# Patient Record
Sex: MALE | Race: WHITE | NOT HISPANIC OR LATINO
[De-identification: names, ages, dates, MRNs, and addresses within clinical notes are randomized per-mention and may not be internally consistent; named-entity substitution may affect disease eponyms.]

---

## 2017-07-17 ENCOUNTER — APPOINTMENT (OUTPATIENT)
Dept: GASTROENTEROLOGY | Facility: CLINIC | Age: 62
End: 2017-07-17

## 2017-07-17 VITALS
HEIGHT: 71 IN | DIASTOLIC BLOOD PRESSURE: 80 MMHG | BODY MASS INDEX: 34.44 KG/M2 | WEIGHT: 246 LBS | TEMPERATURE: 98.1 F | SYSTOLIC BLOOD PRESSURE: 120 MMHG

## 2017-07-17 DIAGNOSIS — Z80.9 FAMILY HISTORY OF MALIGNANT NEOPLASM, UNSPECIFIED: ICD-10-CM

## 2017-07-17 DIAGNOSIS — K44.9 DIAPHRAGMATIC HERNIA W/OUT OBSTRUCTION OR GANGRENE: ICD-10-CM

## 2017-07-17 DIAGNOSIS — K57.90 DIVERTICULOSIS OF INTESTINE, PART UNSPECIFIED, W/OUT PERFORATION OR ABSCESS W/OUT BLEEDING: ICD-10-CM

## 2017-07-17 DIAGNOSIS — Z71.89 OTHER SPECIFIED COUNSELING: ICD-10-CM

## 2017-07-17 DIAGNOSIS — K92.2 GASTROINTESTINAL HEMORRHAGE, UNSPECIFIED: ICD-10-CM

## 2017-07-17 DIAGNOSIS — D64.9 ANEMIA, UNSPECIFIED: ICD-10-CM

## 2017-07-17 RX ORDER — ALPRAZOLAM 0.25 MG/1
0.25 TABLET ORAL
Qty: 60 | Refills: 0 | Status: ACTIVE | COMMUNITY
Start: 2017-01-27

## 2017-07-17 RX ORDER — CARVEDILOL 3.12 MG/1
3.12 TABLET, FILM COATED ORAL
Qty: 60 | Refills: 0 | Status: ACTIVE | COMMUNITY
Start: 2017-07-14

## 2017-07-17 RX ORDER — VENLAFAXINE 75 MG/1
75 TABLET ORAL
Qty: 30 | Refills: 0 | Status: ACTIVE | COMMUNITY
Start: 2016-10-10

## 2017-07-17 RX ORDER — DICLOFENAC SODIUM 10 MG/G
1 GEL TOPICAL
Qty: 500 | Refills: 0 | Status: ACTIVE | COMMUNITY
Start: 2017-04-06

## 2017-07-18 ENCOUNTER — APPOINTMENT (OUTPATIENT)
Dept: GASTROENTEROLOGY | Facility: AMBULATORY MEDICAL SERVICES | Age: 62
End: 2017-07-18

## 2017-07-19 ENCOUNTER — APPOINTMENT (OUTPATIENT)
Dept: GASTROENTEROLOGY | Facility: CLINIC | Age: 62
End: 2017-07-19

## 2017-07-25 ENCOUNTER — LABORATORY RESULT (OUTPATIENT)
Age: 62
End: 2017-07-25

## 2017-08-14 ENCOUNTER — RX RENEWAL (OUTPATIENT)
Age: 62
End: 2017-08-14

## 2017-08-14 RX ORDER — SODIUM SULFATE, POTASSIUM SULFATE, MAGNESIUM SULFATE 17.5; 3.13; 1.6 G/ML; G/ML; G/ML
17.5-3.13-1.6 SOLUTION, CONCENTRATE ORAL
Qty: 1 | Refills: 0 | Status: ACTIVE | COMMUNITY
Start: 2017-08-14 | End: 1900-01-01

## 2017-08-18 ENCOUNTER — OUTPATIENT (OUTPATIENT)
Dept: OUTPATIENT SERVICES | Facility: HOSPITAL | Age: 62
LOS: 1 days | Discharge: HOME | End: 2017-08-18

## 2017-08-18 ENCOUNTER — APPOINTMENT (OUTPATIENT)
Dept: GASTROENTEROLOGY | Facility: HOSPITAL | Age: 62
End: 2017-08-18
Payer: COMMERCIAL

## 2017-08-18 VITALS
OXYGEN SATURATION: 97 % | WEIGHT: 246.92 LBS | SYSTOLIC BLOOD PRESSURE: 103 MMHG | RESPIRATION RATE: 16 BRPM | TEMPERATURE: 97 F | HEART RATE: 70 BPM | HEIGHT: 71 IN | DIASTOLIC BLOOD PRESSURE: 68 MMHG

## 2017-08-18 VITALS
DIASTOLIC BLOOD PRESSURE: 60 MMHG | RESPIRATION RATE: 18 BRPM | HEART RATE: 62 BPM | OXYGEN SATURATION: 99 % | SYSTOLIC BLOOD PRESSURE: 104 MMHG

## 2017-08-18 DIAGNOSIS — K92.1 MELENA: ICD-10-CM

## 2017-08-18 DIAGNOSIS — Z98.89 OTHER SPECIFIED POSTPROCEDURAL STATES: Chronic | ICD-10-CM

## 2017-08-18 LAB
HCT VFR BLD CALC: 40.8 % — SIGNIFICANT CHANGE UP (ref 39–50)
HGB BLD-MCNC: 12.7 G/DL — LOW (ref 13–17)
MCHC RBC-ENTMCNC: 23.8 PG — LOW (ref 27–34)
MCHC RBC-ENTMCNC: 31 GM/DL — LOW (ref 32–36)
MCV RBC AUTO: 77 FL — LOW (ref 80–100)
PLATELET # BLD AUTO: 256 K/UL — SIGNIFICANT CHANGE UP (ref 150–400)
RBC # BLD: 5.31 M/UL — SIGNIFICANT CHANGE UP (ref 4.2–5.8)
RBC # FLD: 22.7 % — HIGH (ref 11–15)
WBC # BLD: 9 K/UL — SIGNIFICANT CHANGE UP (ref 3.8–10.5)
WBC # FLD AUTO: 9 K/UL — SIGNIFICANT CHANGE UP (ref 3.8–10.5)

## 2017-08-18 PROCEDURE — G0121 COLON CA SCRN NOT HI RSK IND: CPT | Mod: 53

## 2017-08-18 RX ORDER — SODIUM CHLORIDE 9 MG/ML
1000 INJECTION, SOLUTION INTRAVENOUS
Qty: 0 | Refills: 0 | Status: DISCONTINUED | OUTPATIENT
Start: 2017-08-18 | End: 2017-09-02

## 2017-08-23 DIAGNOSIS — D64.9 ANEMIA, UNSPECIFIED: ICD-10-CM

## 2017-08-23 DIAGNOSIS — I10 ESSENTIAL (PRIMARY) HYPERTENSION: ICD-10-CM

## 2017-09-05 ENCOUNTER — INPATIENT (INPATIENT)
Facility: HOSPITAL | Age: 62
LOS: 2 days | Discharge: HOME | End: 2017-09-08
Attending: HOSPITALIST | Admitting: HOSPITALIST
Payer: COMMERCIAL

## 2017-09-05 VITALS
WEIGHT: 246.92 LBS | RESPIRATION RATE: 18 BRPM | DIASTOLIC BLOOD PRESSURE: 85 MMHG | HEART RATE: 8 BPM | OXYGEN SATURATION: 99 % | TEMPERATURE: 98 F | HEIGHT: 71 IN | SYSTOLIC BLOOD PRESSURE: 131 MMHG

## 2017-09-05 DIAGNOSIS — Z98.89 OTHER SPECIFIED POSTPROCEDURAL STATES: Chronic | ICD-10-CM

## 2017-09-05 LAB
ABO RH CONFIRMATION: SIGNIFICANT CHANGE UP
ALBUMIN SERPL ELPH-MCNC: 2.9 G/DL — LOW (ref 3.3–5)
ALP SERPL-CCNC: 51 U/L — SIGNIFICANT CHANGE UP (ref 40–120)
ALT FLD-CCNC: 20 U/L — SIGNIFICANT CHANGE UP (ref 12–78)
ANION GAP SERPL CALC-SCNC: 12 MMOL/L — SIGNIFICANT CHANGE UP (ref 5–17)
APTT BLD: 28.5 SEC — SIGNIFICANT CHANGE UP (ref 27.5–37.4)
AST SERPL-CCNC: 17 U/L — SIGNIFICANT CHANGE UP (ref 15–37)
BASOPHILS NFR BLD AUTO: 1 % — SIGNIFICANT CHANGE UP (ref 0–2)
BILIRUB SERPL-MCNC: 0.1 MG/DL — LOW (ref 0.2–1.2)
BLD GP AB SCN SERPL QL: SIGNIFICANT CHANGE UP
BUN SERPL-MCNC: 18 MG/DL — SIGNIFICANT CHANGE UP (ref 7–23)
CALCIUM SERPL-MCNC: 8.2 MG/DL — LOW (ref 8.5–10.1)
CHLORIDE SERPL-SCNC: 104 MMOL/L — SIGNIFICANT CHANGE UP (ref 96–108)
CO2 SERPL-SCNC: 25 MMOL/L — SIGNIFICANT CHANGE UP (ref 22–31)
CREAT SERPL-MCNC: 0.85 MG/DL — SIGNIFICANT CHANGE UP (ref 0.5–1.3)
GLUCOSE SERPL-MCNC: 100 MG/DL — HIGH (ref 70–99)
HCT VFR BLD CALC: 20.5 % — CRITICAL LOW (ref 39–50)
HGB BLD-MCNC: 6.2 G/DL — CRITICAL LOW (ref 13–17)
INR BLD: 1.17 RATIO — HIGH (ref 0.88–1.16)
LYMPHOCYTES # BLD AUTO: 11 % — LOW (ref 13–44)
MCHC RBC-ENTMCNC: 23.8 PG — LOW (ref 27–34)
MCHC RBC-ENTMCNC: 30.2 GM/DL — LOW (ref 32–36)
MCV RBC AUTO: 78.8 FL — LOW (ref 80–100)
MONOCYTES NFR BLD AUTO: 5 % — SIGNIFICANT CHANGE UP (ref 2–14)
NEUTROPHILS NFR BLD AUTO: 83 % — HIGH (ref 43–77)
OB PNL STL: NEGATIVE — SIGNIFICANT CHANGE UP
PLATELET # BLD AUTO: 384 K/UL — SIGNIFICANT CHANGE UP (ref 150–400)
POTASSIUM SERPL-MCNC: 3.4 MMOL/L — LOW (ref 3.5–5.3)
POTASSIUM SERPL-SCNC: 3.4 MMOL/L — LOW (ref 3.5–5.3)
PROT SERPL-MCNC: 6.3 GM/DL — SIGNIFICANT CHANGE UP (ref 6–8.3)
PROTHROM AB SERPL-ACNC: 12.8 SEC — HIGH (ref 9.8–12.7)
RBC # BLD: 2.6 M/UL — LOW (ref 4.2–5.8)
RBC # FLD: 20.9 % — HIGH (ref 11–15)
SODIUM SERPL-SCNC: 141 MMOL/L — SIGNIFICANT CHANGE UP (ref 135–145)
WBC # BLD: 11.7 K/UL — HIGH (ref 3.8–10.5)
WBC # FLD AUTO: 11.7 K/UL — HIGH (ref 3.8–10.5)

## 2017-09-05 PROCEDURE — 99284 EMERGENCY DEPT VISIT MOD MDM: CPT

## 2017-09-05 RX ORDER — SODIUM CHLORIDE 9 MG/ML
1000 INJECTION INTRAMUSCULAR; INTRAVENOUS; SUBCUTANEOUS ONCE
Qty: 0 | Refills: 0 | Status: COMPLETED | OUTPATIENT
Start: 2017-09-05 | End: 2017-09-05

## 2017-09-05 RX ADMIN — SODIUM CHLORIDE 1000 MILLILITER(S): 9 INJECTION INTRAMUSCULAR; INTRAVENOUS; SUBCUTANEOUS at 21:47

## 2017-09-05 NOTE — ED PROVIDER NOTE - PROGRESS NOTE DETAILS
pt. for admission to [facility], discussed with patient and Dr. reyes  will transfuse 2 unites for hbg 6, pt. stable on monitor

## 2017-09-05 NOTE — ED ADULT TRIAGE NOTE - CHIEF COMPLAINT QUOTE
pt presents to the ED with c/o feeling fatigue and pale, has a hx of GI bleed in the past with transfusion

## 2017-09-05 NOTE — ED ADULT NURSE NOTE - OBJECTIVE STATEMENT
pt came in with c/o weakness,fatigue and bodyache, pt had hx of low blood count with transfusion, denies any kind of bleeding what so ever, axox3, not in any distress.

## 2017-09-05 NOTE — ED PROVIDER NOTE - MEDICAL DECISION MAKING DETAILS
61 yo M with easy fatigability  -concerning for anemia, basic labs cbc, cmp  -f/u results, IV, NS, call GI doc

## 2017-09-06 DIAGNOSIS — D64.9 ANEMIA, UNSPECIFIED: ICD-10-CM

## 2017-09-06 DIAGNOSIS — F41.9 ANXIETY DISORDER, UNSPECIFIED: ICD-10-CM

## 2017-09-06 DIAGNOSIS — Z72.0 TOBACCO USE: ICD-10-CM

## 2017-09-06 DIAGNOSIS — Z29.9 ENCOUNTER FOR PROPHYLACTIC MEASURES, UNSPECIFIED: ICD-10-CM

## 2017-09-06 LAB
FERRITIN SERPL-MCNC: 30 NG/ML — SIGNIFICANT CHANGE UP (ref 30–400)
HCT VFR BLD CALC: 22.4 % — LOW (ref 39–50)
HCT VFR BLD CALC: 23.3 % — LOW (ref 39–50)
HGB BLD-MCNC: 7 G/DL — CRITICAL LOW (ref 13–17)
HGB BLD-MCNC: 7.2 G/DL — LOW (ref 13–17)
IRON SATN MFR SERPL: 21 % — SIGNIFICANT CHANGE UP (ref 16–55)
IRON SATN MFR SERPL: 66 UG/DL — SIGNIFICANT CHANGE UP (ref 45–165)
MCHC RBC-ENTMCNC: 25.1 PG — LOW (ref 27–34)
MCHC RBC-ENTMCNC: 25.7 PG — LOW (ref 27–34)
MCHC RBC-ENTMCNC: 30.9 GM/DL — LOW (ref 32–36)
MCHC RBC-ENTMCNC: 31.3 GM/DL — LOW (ref 32–36)
MCV RBC AUTO: 81.2 FL — SIGNIFICANT CHANGE UP (ref 80–100)
MCV RBC AUTO: 82.2 FL — SIGNIFICANT CHANGE UP (ref 80–100)
PLATELET # BLD AUTO: 300 K/UL — SIGNIFICANT CHANGE UP (ref 150–400)
PLATELET # BLD AUTO: 324 K/UL — SIGNIFICANT CHANGE UP (ref 150–400)
RBC # BLD: 2.72 M/UL — LOW (ref 4.2–5.8)
RBC # BLD: 2.88 M/UL — LOW (ref 4.2–5.8)
RBC # FLD: 19.6 % — HIGH (ref 11–15)
RBC # FLD: 19.7 % — HIGH (ref 11–15)
TIBC SERPL-MCNC: 307 UG/DL — SIGNIFICANT CHANGE UP (ref 220–430)
UIBC SERPL-MCNC: 241 UG/DL — SIGNIFICANT CHANGE UP (ref 110–370)
VIT B12 SERPL-MCNC: 493 PG/ML — SIGNIFICANT CHANGE UP (ref 243–894)
WBC # BLD: 7.6 K/UL — SIGNIFICANT CHANGE UP (ref 3.8–10.5)
WBC # BLD: 8.2 K/UL — SIGNIFICANT CHANGE UP (ref 3.8–10.5)
WBC # FLD AUTO: 7.6 K/UL — SIGNIFICANT CHANGE UP (ref 3.8–10.5)
WBC # FLD AUTO: 8.2 K/UL — SIGNIFICANT CHANGE UP (ref 3.8–10.5)

## 2017-09-06 PROCEDURE — 99223 1ST HOSP IP/OBS HIGH 75: CPT

## 2017-09-06 PROCEDURE — 93010 ELECTROCARDIOGRAM REPORT: CPT

## 2017-09-06 PROCEDURE — 12345: CPT | Mod: NC

## 2017-09-06 RX ORDER — NICOTINE POLACRILEX 2 MG
1 GUM BUCCAL DAILY
Qty: 0 | Refills: 0 | Status: DISCONTINUED | OUTPATIENT
Start: 2017-09-06 | End: 2017-09-08

## 2017-09-06 RX ORDER — SODIUM CHLORIDE 9 MG/ML
1000 INJECTION, SOLUTION INTRAVENOUS
Qty: 0 | Refills: 0 | Status: DISCONTINUED | OUTPATIENT
Start: 2017-09-06 | End: 2017-09-08

## 2017-09-06 RX ORDER — PANTOPRAZOLE SODIUM 20 MG/1
40 TABLET, DELAYED RELEASE ORAL
Qty: 0 | Refills: 0 | Status: DISCONTINUED | OUTPATIENT
Start: 2017-09-06 | End: 2017-09-08

## 2017-09-06 RX ORDER — VENLAFAXINE HCL 75 MG
25 CAPSULE, EXT RELEASE 24 HR ORAL AT BEDTIME
Qty: 0 | Refills: 0 | Status: DISCONTINUED | OUTPATIENT
Start: 2017-09-06 | End: 2017-09-08

## 2017-09-06 RX ORDER — ZOLPIDEM TARTRATE 10 MG/1
5 TABLET ORAL AT BEDTIME
Qty: 0 | Refills: 0 | Status: DISCONTINUED | OUTPATIENT
Start: 2017-09-06 | End: 2017-09-08

## 2017-09-06 RX ADMIN — Medication 25 MILLIGRAM(S): at 21:42

## 2017-09-06 RX ADMIN — ZOLPIDEM TARTRATE 5 MILLIGRAM(S): 10 TABLET ORAL at 23:47

## 2017-09-06 RX ADMIN — PANTOPRAZOLE SODIUM 40 MILLIGRAM(S): 20 TABLET, DELAYED RELEASE ORAL at 05:40

## 2017-09-06 RX ADMIN — PANTOPRAZOLE SODIUM 40 MILLIGRAM(S): 20 TABLET, DELAYED RELEASE ORAL at 18:12

## 2017-09-06 RX ADMIN — SODIUM CHLORIDE 80 MILLILITER(S): 9 INJECTION, SOLUTION INTRAVENOUS at 07:33

## 2017-09-06 NOTE — H&P ADULT - NSHPPHYSICALEXAM_GEN_ALL_CORE
GENERAL: NAD, well-groomed, well-developed  HEAD:  Atraumatic, Normocephalic  EYES: EOMI, PERRLA, conjunctiva and sclera clear  ENMT: No tonsillar erythema, exudates, or enlargement; Moist mucous membranes, No lesions  NECK: Supple, No JVD, Normal thyroid  NERVOUS SYSTEM:  Alert & Oriented X3, Good concentration  CHEST/LUNG: Clear to auscultation bilaterally; No rales, rhonchi, wheezing, or rubs  HEART: Regular rate and rhythm; No murmurs, rubs, or gallops  ABDOMEN: Soft, Nontender, Nondistended; Bowel sounds present  EXTREMITIES: No clubbing, cyanosis, or edema  SKIN: no rashes, lesions, bruising  PSYCH: normal affect and behavior

## 2017-09-06 NOTE — CONSULT NOTE ADULT - SUBJECTIVE AND OBJECTIVE BOX
Chief Complaint:  Patient is a 62y old  Male who presents with a chief complaint of Anemia (06 Sep 2017 01:55)      HPI:  62 year old man with PMH PUD with bleeding ulcers in the past (was taking NSAIDs for back pain) presents sent in by GI Dr Brunner for feeling weak and lethargic-h/h 6.2/20.5 in ED.  Patient has had a history of anemic episodes and has been worked up by his GI in the past; the last 2 times his EGD was normal but Colonoscopy needed to be repeated due to poor prep.  He states that he works out regularly and generally feels "great, half my age" but noticed that he was markedly weak and tired over the last few days.  He denies bruising, bleeding gums, dark stools, nausea, vomiting, diarrhea, abdominal pain.  Pt states he had polyps removed in previous Colonoscopy 3 year ago, Last Colon was 2weeks ago, patient states MD told him prep was poor.    In the ED, FOBT neg, h/h 6.2/20.5, vitals stable.  ED ordered 2 units pRBCs for transfusion. (06 Sep 2017 01:55)      PMH/PSH:PAST MEDICAL & SURGICAL HISTORY:  Gastrointestinal ulcer  Anxiety  Seasonal allergies  H/O inguinal hernia repair      Allergies:  No Known Allergies      Medications:  dextrose 5% + sodium chloride 0.9%. 1000 milliLiter(s) IV Continuous <Continuous>  venlafaxine 25 milliGRAM(s) Oral at bedtime  zolpidem 5 milliGRAM(s) Oral at bedtime PRN  pantoprazole    Tablet 40 milliGRAM(s) Oral two times a day before meals  nicotine -  14 mG/24Hr(s) Patch 1 patch Transdermal daily      Review of Systems:    General:  No weight loss, fevers, chills, night sweats, fatigue,   Eyes:  Good vision, no reported pain  ENT:  No sore throat, pain, runny nose, dysphagia  CV:  No pain, palpitations, hypo/hypertension  Resp:  No dyspnea, cough, tachypnea, wheezing  GI:  No pain, No nausea, No vomiting, No diarrhea, No constipation, No pruritis, No rectal bleeding, No tarry stools, No dysphagia,  :  No pain, bleeding, incontinence, nocturia  Muscle:  No pain, weakness  Breast:  No pain, abscess, mass, discharge  Neuro:  No weakness, tingling, memory problems  Psych:  No fatigue, insomnia, mood problems, depression  Endocrine:  No polyuria, polydipsia cold/heat intolerance  Heme:  No petechiae, ecchymosis, easy bruisability  Skin:  No rash, tattoos, scars, edema    FAMILY HISTORY:  Family history of hypertension (Father)      Relevant Social History:     Physical Exam:    Vital Signs:  Vital Signs Last 24 Hrs  T(C): 36.8 (06 Sep 2017 10:52), Max: 37.3 (06 Sep 2017 00:30)  T(F): 98.2 (06 Sep 2017 10:52), Max: 99.1 (06 Sep 2017 00:30)  HR: 69 (06 Sep 2017 10:52) (8 - 81)  BP: 124/46 (06 Sep 2017 10:52) (113/72 - 136/78)  BP(mean): --  RR: 18 (06 Sep 2017 10:52) (17 - 19)  SpO2: 96% (06 Sep 2017 10:52) (96% - 99%)  Daily Height in cm: 180.34 (06 Sep 2017 04:35)    Daily Weight in k.8 (06 Sep 2017 04:35)    General:  Appears stated age, well-groomed, well-nourished, no distress  HEENT:  NC/AT,  conjunctivae clear and pink, no thyromegaly, nodules, adenopathy, no JVD  Chest:  Full & symmetric excursion, no increased effort, breath sounds clear  Cardiovascular:  Regular rhythm, S1, S2, no murmur/rub/S3/S4, no abdominal bruit, no edema  Abdomen:  Soft, non-tender, non-distended, normoactive bowel sounds,  no masses , no hepatosplenomegaly, no signs of chronic liver disease  Extremities:  no cyanosis, clubbing or edema  Skin:  No rash/erythema/ecchymoses/petechiae/wounds/abscess/warm/dry  Neuro/Psych:  Alert, oriented, no asterixis, no tremor, no encephalopathy    Laboratory:                          7.2    8.2   )-----------( 324      ( 06 Sep 2017 15:59 )             23.3     09-05    141  |  104  |  18  ----------------------------<  100<H>  3.4<L>   |  25  |  0.85    Ca    8.2<L>      05 Sep 2017 21:38    TPro  6.3  /  Alb  2.9<L>  /  TBili  0.1<L>  /  DBili  x   /  AST  17  /  ALT  20  /  AlkPhos  51  09-05    LIVER FUNCTIONS - ( 05 Sep 2017 21:38 )  Alb: 2.9 g/dL / Pro: 6.3 gm/dL / ALK PHOS: 51 U/L / ALT: 20 U/L / AST: 17 U/L / GGT: x           PT/INR - ( 05 Sep 2017 21:38 )   PT: 12.8 sec;   INR: 1.17 ratio         PTT - ( 05 Sep 2017 21:38 )  PTT:28.5 sec    Imaging:      Chief Complaint:  Patient is a 62y old  Male who presents with a chief complaint of Anemia (06 Sep 2017 01:55)      HPI:  62 year old man with PMH PUD with bleeding ulcers in the past (was taking NSAIDs for back pain) presents sent in by GI MD for feeling weak and lethargic-h/h 6.2/20.5 in ED.  Patient has had a history of anemic episodes and has been worked up by his GI in the past; the last 2 times his EGD was normal but Colonoscopy needed to be repeated due to poor prep.  He states that he works out regularly and generally feels "great, half my age" but noticed that he was markedly weak and tired over the last few days.  He denies bruising, bleeding gums, dark stools, nausea, vomiting, diarrhea, abdominal pain.    Pt had polyps removed in previous Colonoscopy.    In the ED, FOBT neg, h/h 6.2/20.5, vitals stable.  ED ordered 2 units pRBCs for transfusion. (06 Sep 2017 01:55)      PMH/PSH:PAST MEDICAL & SURGICAL HISTORY:  Gastrointestinal ulcer  Anxiety  Seasonal allergies  H/O inguinal hernia repair      Allergies:  No Known Allergies      Medications:  dextrose 5% + sodium chloride 0.9%. 1000 milliLiter(s) IV Continuous <Continuous>  venlafaxine 25 milliGRAM(s) Oral at bedtime  zolpidem 5 milliGRAM(s) Oral at bedtime PRN  pantoprazole    Tablet 40 milliGRAM(s) Oral two times a day before meals  nicotine -  14 mG/24Hr(s) Patch 1 patch Transdermal daily      Review of Systems:    General:  No weight loss, fevers, chills, night sweats, fatigue,   Eyes:  Good vision, no reported pain  ENT:  No sore throat, pain, runny nose, dysphagia  CV:  No pain, palpitations, hypo/hypertension  Resp:  No dyspnea, cough, tachypnea, wheezing  GI:  No pain, No nausea, No vomiting, No diarrhea, No constipation, No pruritis, No rectal bleeding, No tarry stools, No dysphagia,  :  No pain, bleeding, incontinence, nocturia  Muscle:  No pain, weakness  Breast:  No pain, abscess, mass, discharge  Neuro:  No weakness, tingling, memory problems  Psych:  No fatigue, insomnia, mood problems, depression  Endocrine:  No polyuria, polydypsia, cold/heat intolerance  Heme:  No petechiae, ecchymosis, easy bruisability  Skin:  No rash, tattoos, scars, edema    FAMILY HISTORY:  Family history of hypertension (Father)      Relevant Social History:     Physical Exam:    Vital Signs:  Vital Signs Last 24 Hrs  T(C): 36.8 (06 Sep 2017 10:52), Max: 37.3 (06 Sep 2017 00:30)  T(F): 98.2 (06 Sep 2017 10:52), Max: 99.1 (06 Sep 2017 00:30)  HR: 69 (06 Sep 2017 10:52) (8 - 81)  BP: 124/46 (06 Sep 2017 10:52) (113/72 - 136/78)  BP(mean): --  RR: 18 (06 Sep 2017 10:52) (17 - 19)  SpO2: 96% (06 Sep 2017 10:52) (96% - 99%)  Daily Height in cm: 180.34 (06 Sep 2017 04:35)    Daily Weight in k.8 (06 Sep 2017 04:35)    General:  Appears stated age, well-groomed, well-nourished, no distress  HEENT:  NC/AT,  conjunctivae clear and pink, no thyromegaly, nodules, adenopathy, no JVD  Chest:  Full & symmetric excursion, no increased effort, breath sounds clear  Cardiovascular:  Regular rhythm, S1, S2, no murmur/rub/S3/S4, no abdominal bruit, no edema  Abdomen:  Soft, non-tender, non-distended, normoactive bowel sounds,  no masses , no hepatosplenomegaly, no signs of chronic liver disease  Extremities:  no cyanosis, clubbing or edema  Skin:  No rash/erythema/ecchymoses/petechiae/wounds/abscess/warm/dry  Neuro/Psych:  Alert, oriented, no asterixis, no tremor, no encephalopathy    Laboratory:                          7.2    8.2   )-----------( 324      ( 06 Sep 2017 15:59 )             23.3         141  |  104  |  18  ----------------------------<  100<H>  3.4<L>   |  25  |  0.85    Ca    8.2<L>      05 Sep 2017 21:38    TPro  6.3  /  Alb  2.9<L>  /  TBili  0.1<L>  /  DBili  x   /  AST  17  /  ALT  20  /  AlkPhos  51  -    LIVER FUNCTIONS - ( 05 Sep 2017 21:38 )  Alb: 2.9 g/dL / Pro: 6.3 gm/dL / ALK PHOS: 51 U/L / ALT: 20 U/L / AST: 17 U/L / GGT: x           PT/INR - ( 05 Sep 2017 21:38 )   PT: 12.8 sec;   INR: 1.17 ratio         PTT - ( 05 Sep 2017 21:38 )  PTT:28.5 sec

## 2017-09-06 NOTE — H&P ADULT - HISTORY OF PRESENT ILLNESS
62 year old man with PMH PUD with bleeding ulcers in the past (was taking NSAIDs for back pain) presents sent in by GI MD for feeling weak and lethargic-h/h 6.2/20.5 in ED.  Patient has had a history of anemic episodes and has been worked up by his GI 62 year old man with PMH PUD with bleeding ulcers in the past (was taking NSAIDs for back pain) presents sent in by GI MD for feeling weak and lethargic-h/h 6.2/20.5 in ED.  Patient has had a history of anemic episodes and has been worked up by his GI in the past; the last 2 times his EGD was normal but Colonoscopy needed to be repeated due to poor prep.  He states that he works out regularly and generally feels "great, half my age" but noticed that he was markedly weak and tired over the last few days.  He denies bruising, bleeding gums, dark stools, nausea, vomiting, diarrhea, abdominal pain.    Pt had polyps removed in previous Colonoscopy.    In the ED, FOBT neg, h/h 6.2/20.5, vitals stable.  ED ordered 2 units pRBCs for transfusion.

## 2017-09-06 NOTE — H&P ADULT - ASSESSMENT
62 year old man with PMH PUD with bleeding ulcers in the past (was taking NSAIDs for back pain) presents sent in by GI MD for feeling weak and lethargic-h/h 6.2/20.5 in ED.  Signs, symptoms, and work up consistent with Symptomatic anemia possibly from a GI source.

## 2017-09-06 NOTE — H&P ADULT - PROBLEM SELECTOR PLAN 1
Will transfuse 2 units pRBCs now.  Repeat CBC ~4 hours post transfusion.  FOBT neg  Send anemia work, Iron, Ferritin, TIBC, B12, likely secondary to GIB given history  Telemetry for now  GI Prophylaxis with Protonix 40mg po BID.  NPO for now, GI consult  D5 with NS, finger stick q6h

## 2017-09-06 NOTE — H&P ADULT - NSHPLABSRESULTS_GEN_ALL_CORE
Vital Signs Last 24 Hrs  T(C): 37.1 (06 Sep 2017 01:53), Max: 37.3 (06 Sep 2017 00:30)  T(F): 98.8 (06 Sep 2017 01:53), Max: 99.1 (06 Sep 2017 00:30)  HR: 77 (06 Sep 2017 01:53) (8 - 81)  BP: 113/72 (06 Sep 2017 01:53) (113/72 - 136/78)  BP(mean): --  RR: 19 (06 Sep 2017 01:53) (17 - 19)  SpO2: 96% (06 Sep 2017 01:53) (96% - 99%)          LABS:                        6.2    11.7  )-----------( 384      ( 05 Sep 2017 21:38 )             20.5     09-05    141  |  104  |  18  ----------------------------<  100<H>  3.4<L>   |  25  |  0.85    Ca    8.2<L>      05 Sep 2017 21:38    TPro  6.3  /  Alb  2.9<L>  /  TBili  0.1<L>  /  DBili  x   /  AST  17  /  ALT  20  /  AlkPhos  51  09-05    PT/INR - ( 05 Sep 2017 21:38 )   PT: 12.8 sec;   INR: 1.17 ratio         PTT - ( 05 Sep 2017 21:38 )  PTT:28.5 sec

## 2017-09-06 NOTE — CHART NOTE - NSCHARTNOTEFT_GEN_A_CORE
62 year old man with PMH PUD with bleeding ulcers in the past (was taking NSAIDs for back pain) presents sent in by GI MD for feeling weak and lethargic-h/h 6.2/20.5 in ED.   In Aug 18 2017 ,H/H =12.7/40.8 , negative occult blood , GI consulted-  , s/p 2 units PRBC, Hb s/p transfusion = 7/22.4, repeat CBC again , will transfuse if <8 , c/w IVF, On clears  Discussed in length w/ pt 62 year old man with PMH PUD with bleeding ulcers in the past (was taking NSAIDs for back pain) presents sent in by GI MD for feeling weak and lethargic-h/h 6.2/20.5 in ED.   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 Again was called from floor as pt requesting to talk , spoke again in length twice more .

## 2017-09-07 LAB
ANION GAP SERPL CALC-SCNC: 7 MMOL/L — SIGNIFICANT CHANGE UP (ref 5–17)
BUN SERPL-MCNC: 8 MG/DL — SIGNIFICANT CHANGE UP (ref 7–23)
CALCIUM SERPL-MCNC: 7.9 MG/DL — LOW (ref 8.5–10.1)
CHLORIDE SERPL-SCNC: 110 MMOL/L — HIGH (ref 96–108)
CO2 SERPL-SCNC: 27 MMOL/L — SIGNIFICANT CHANGE UP (ref 22–31)
CREAT SERPL-MCNC: 0.71 MG/DL — SIGNIFICANT CHANGE UP (ref 0.5–1.3)
GLUCOSE SERPL-MCNC: 88 MG/DL — SIGNIFICANT CHANGE UP (ref 70–99)
HCT VFR BLD CALC: 23.6 % — LOW (ref 39–50)
HCT VFR BLD CALC: 23.7 % — LOW (ref 39–50)
HCT VFR BLD CALC: 25.5 % — LOW (ref 39–50)
HGB BLD-MCNC: 7.5 G/DL — LOW (ref 13–17)
HGB BLD-MCNC: 7.6 G/DL — LOW (ref 13–17)
HGB BLD-MCNC: 8.1 G/DL — LOW (ref 13–17)
MCHC RBC-ENTMCNC: 25.2 PG — LOW (ref 27–34)
MCHC RBC-ENTMCNC: 25.3 PG — LOW (ref 27–34)
MCHC RBC-ENTMCNC: 25.7 PG — LOW (ref 27–34)
MCHC RBC-ENTMCNC: 31.6 GM/DL — LOW (ref 32–36)
MCHC RBC-ENTMCNC: 31.7 GM/DL — LOW (ref 32–36)
MCHC RBC-ENTMCNC: 31.9 GM/DL — LOW (ref 32–36)
MCV RBC AUTO: 79.9 FL — LOW (ref 80–100)
MCV RBC AUTO: 80 FL — SIGNIFICANT CHANGE UP (ref 80–100)
MCV RBC AUTO: 80.5 FL — SIGNIFICANT CHANGE UP (ref 80–100)
PLATELET # BLD AUTO: 315 K/UL — SIGNIFICANT CHANGE UP (ref 150–400)
PLATELET # BLD AUTO: 320 K/UL — SIGNIFICANT CHANGE UP (ref 150–400)
PLATELET # BLD AUTO: 324 K/UL — SIGNIFICANT CHANGE UP (ref 150–400)
POTASSIUM SERPL-MCNC: 3.9 MMOL/L — SIGNIFICANT CHANGE UP (ref 3.5–5.3)
POTASSIUM SERPL-SCNC: 3.9 MMOL/L — SIGNIFICANT CHANGE UP (ref 3.5–5.3)
RBC # BLD: 2.94 M/UL — LOW (ref 4.2–5.8)
RBC # BLD: 2.95 M/UL — LOW (ref 4.2–5.8)
RBC # BLD: 3.19 M/UL — LOW (ref 4.2–5.8)
RBC # FLD: 18.9 % — HIGH (ref 11–15)
RBC # FLD: 19.3 % — HIGH (ref 11–15)
RBC # FLD: 19.6 % — HIGH (ref 11–15)
SODIUM SERPL-SCNC: 144 MMOL/L — SIGNIFICANT CHANGE UP (ref 135–145)
WBC # BLD: 7.3 K/UL — SIGNIFICANT CHANGE UP (ref 3.8–10.5)
WBC # BLD: 7.4 K/UL — SIGNIFICANT CHANGE UP (ref 3.8–10.5)
WBC # BLD: 8.6 K/UL — SIGNIFICANT CHANGE UP (ref 3.8–10.5)
WBC # FLD AUTO: 7.3 K/UL — SIGNIFICANT CHANGE UP (ref 3.8–10.5)
WBC # FLD AUTO: 7.4 K/UL — SIGNIFICANT CHANGE UP (ref 3.8–10.5)
WBC # FLD AUTO: 8.6 K/UL — SIGNIFICANT CHANGE UP (ref 3.8–10.5)

## 2017-09-07 PROCEDURE — 99233 SBSQ HOSP IP/OBS HIGH 50: CPT

## 2017-09-07 RX ORDER — DIPHENHYDRAMINE HCL 50 MG
25 CAPSULE ORAL ONCE
Qty: 0 | Refills: 0 | Status: DISCONTINUED | OUTPATIENT
Start: 2017-09-07 | End: 2017-09-07

## 2017-09-07 RX ORDER — IOHEXOL 300 MG/ML
30 INJECTION, SOLUTION INTRAVENOUS ONCE
Qty: 0 | Refills: 0 | Status: COMPLETED | OUTPATIENT
Start: 2017-09-07 | End: 2017-09-08

## 2017-09-07 RX ORDER — ALPRAZOLAM 0.25 MG
0.25 TABLET ORAL ONCE
Qty: 0 | Refills: 0 | Status: DISCONTINUED | OUTPATIENT
Start: 2017-09-07 | End: 2017-09-07

## 2017-09-07 RX ADMIN — Medication 1 PATCH: at 14:54

## 2017-09-07 RX ADMIN — Medication 0.25 MILLIGRAM(S): at 03:53

## 2017-09-07 RX ADMIN — ZOLPIDEM TARTRATE 5 MILLIGRAM(S): 10 TABLET ORAL at 23:41

## 2017-09-07 RX ADMIN — PANTOPRAZOLE SODIUM 40 MILLIGRAM(S): 20 TABLET, DELAYED RELEASE ORAL at 17:33

## 2017-09-07 RX ADMIN — PANTOPRAZOLE SODIUM 40 MILLIGRAM(S): 20 TABLET, DELAYED RELEASE ORAL at 07:01

## 2017-09-07 RX ADMIN — Medication 25 MILLIGRAM(S): at 22:34

## 2017-09-07 NOTE — PROGRESS NOTE ADULT - SUBJECTIVE AND OBJECTIVE BOX
CHIEF COMPLAINT/INTERVAL HISTORY:    Patient is a 62y old  Male who presents with a chief complaint of Anemia (06 Sep 2017 01:55)      HPI:  62 year old man with PMH PUD with bleeding ulcers in the past (was taking NSAIDs for back pain) presents sent in by GI MD for feeling weak and lethargic-h/h 6.2/20.5 in ED.  Patient has had a history of anemic episodes and has been worked up by his GI in the past; the last 2 times his EGD was normal but Colonoscopy needed to be repeated due to poor prep.  He states that he works out regularly and generally feels "great, half my age" but noticed that he was markedly weak and tired over the last few days.  He denies bruising, bleeding gums, dark stools, nausea, vomiting, diarrhea, abdominal pain.    Pt had polyps removed in previous Colonoscopy.    In the ED, FOBT neg, h/h 6.2/20.5, vitals stable.  ED ordered 2 units pRBCs for transfusion. (06 Sep 2017 01:55)    Overnight issues  patient still feels weak- hgb still low   gastroenterologist consult noted   SUBJECTIVE & OBJECTIVE: Pt seen and examined at bedside.   ROS:  CONSTITUTIONAL: No fever, weight loss, or fatigue  NECK: No pain or stiffness  RESPIRATORY: No cough, wheezing, chills or hemoptysis; No shortness of breath  CARDIOVASCULAR: No chest pain, palpitations, dizziness, or leg swelling  GASTROINTESTINAL: No abdominal or epigastric pain. No nausea, vomiting, or hematemesis; No diarrhea or constipation. No melena or hematochezia.  GENITOURINARY: No dysuria, frequency, hematuria, or incontinence  NEUROLOGICAL: No headaches, memory loss, loss of strength, numbness, or tremors  SKIN: No itching, burning, rashes, or lesions   ICU Vital Signs Last 24 Hrs  T(C): 36 (07 Sep 2017 11:56), Max: 36.9 (06 Sep 2017 20:35)  T(F): 96.8 (07 Sep 2017 11:56), Max: 98.4 (06 Sep 2017 20:35)  HR: 69 (07 Sep 2017 11:56) (58 - 69)  BP: 119/66 (07 Sep 2017 11:56) (108/66 - 124/71)  BP(mean): --  ABP: --  ABP(mean): --  RR: 18 (07 Sep 2017 11:56) (17 - 18)  SpO2: 98% (07 Sep 2017 11:56) (97% - 100%)        MEDICATIONS  (STANDING):  dextrose 5% + sodium chloride 0.9%. 1000 milliLiter(s) (80 mL/Hr) IV Continuous <Continuous>  venlafaxine 25 milliGRAM(s) Oral at bedtime  pantoprazole    Tablet 40 milliGRAM(s) Oral two times a day before meals  nicotine -  14 mG/24Hr(s) Patch 1 patch Transdermal daily    MEDICATIONS  (PRN):  zolpidem 5 milliGRAM(s) Oral at bedtime PRN Insomnia        PHYSICAL EXAM:    GENERAL: NAD, well-groomed, well-developed  HEAD:  Atraumatic, Normocephalic  EYES: EOMI, PERRLA, conjunctiva and sclera clear  ENMT: Moist mucous membranes  NECK: Supple, No JVD  NERVOUS SYSTEM:  Alert & Oriented X3, Motor Strength 5/5 B/L upper and lower extremities; DTRs 2+ intact and symmetric  CHEST/LUNG: Clear to auscultation bilaterally; No rales, rhonchi, wheezing, or rubs  HEART: Regular rate and rhythm; No murmurs, rubs, or gallops  ABDOMEN: Soft, Nontender, Nondistended; Bowel sounds present  EXTREMITIES:  2+ Peripheral Pulses, No clubbing, cyanosis, or edema    LABS:                        8.1    7.4   )-----------( 324      ( 07 Sep 2017 15:17 )             25.5     09-07    144  |  110<H>  |  8   ----------------------------<  88  3.9   |  27  |  0.71    Ca    7.9<L>      07 Sep 2017 10:12    TPro  6.3  /  Alb  2.9<L>  /  TBili  0.1<L>  /  DBili  x   /  AST  17  /  ALT  20  /  AlkPhos  51  09-05    PT/INR - ( 05 Sep 2017 21:38 )   PT: 12.8 sec;   INR: 1.17 ratio         PTT - ( 05 Sep 2017 21:38 )  PTT:28.5 sec      CAPILLARY BLOOD GLUCOSE          RECENT CULTURES:      RADIOLOGY & ADDITIONAL TESTS:  Imaging Personally Reviewed:  [ ] YES      Consultant(s) Notes Reviewed:  [ ] YES     Care Discussed with [ ] Consultants [X ] Patient [ ] Family  [x ]    [x ]  Other; RN  HEALTH ISSUES - PROBLEM Dx:  Anemia: Anemia  Preventive measure: Preventive measure  Tobacco abuse: Tobacco abuse  Anxiety: Anxiety  Symptomatic anemia: Symptomatic anemia        DVT/GI ppx  Discussed with pt @ bedside CHIEF COMPLAINT/INTERVAL HISTORY:    Patient is a 62y old  Male who presents with a chief complaint of Anemia (06 Sep 2017 01:55)      HPI:  62 year old man with PMH PUD with bleeding ulcers in the past (was taking NSAIDs for back pain) presents sent in by GI MD for feeling weak and lethargic-h/h 6.2/20.5 in ED.  Patient has had a history of anemic episodes and has been worked up by his GI in the past; the last 2 times his EGD was normal but Colonoscopy needed to be repeated due to poor prep.  He states that he works out regularly and generally feels "great, half my age" but noticed that he was markedly weak and tired over the last few days.  He denies bruising, bleeding gums, dark stools, nausea, vomiting, diarrhea, abdominal pain.    Pt had polyps removed in previous Colonoscopy.    In the ED, FOBT neg, h/h 6.2/20.5, vitals stable.  ED ordered 2 units pRBCs for transfusion. (06 Sep 2017 01:55)    Overnight issues  patient still feels weak- hgb still low   gastroenterologist consult noted   spoke to pts gastroenterologist who agreed with our management but sked to do abdominal ct to r/o retroperitoeal hemorrhage     SUBJECTIVE & OBJECTIVE: Pt seen and examined at bedside.   ROS:  CONSTITUTIONAL: No fever, weight loss, or fatigue  NECK: No pain or stiffness  RESPIRATORY: No cough, wheezing, chills or hemoptysis; No shortness of breath  CARDIOVASCULAR: No chest pain, palpitations, dizziness, or leg swelling  GASTROINTESTINAL: No abdominal or epigastric pain. No nausea, vomiting, or hematemesis; No diarrhea or constipation. No melena or hematochezia.  GENITOURINARY: No dysuria, frequency, hematuria, or incontinence  NEUROLOGICAL: No headaches, memory loss, loss of strength, numbness, or tremors  SKIN: No itching, burning, rashes, or lesions   ICU Vital Signs Last 24 Hrs  T(C): 36 (07 Sep 2017 11:56), Max: 36.9 (06 Sep 2017 20:35)  T(F): 96.8 (07 Sep 2017 11:56), Max: 98.4 (06 Sep 2017 20:35)  HR: 69 (07 Sep 2017 11:56) (58 - 69)  BP: 119/66 (07 Sep 2017 11:56) (108/66 - 124/71)  BP(mean): --  ABP: --  ABP(mean): --  RR: 18 (07 Sep 2017 11:56) (17 - 18)  SpO2: 98% (07 Sep 2017 11:56) (97% - 100%)        MEDICATIONS  (STANDING):  dextrose 5% + sodium chloride 0.9%. 1000 milliLiter(s) (80 mL/Hr) IV Continuous <Continuous>  venlafaxine 25 milliGRAM(s) Oral at bedtime  pantoprazole    Tablet 40 milliGRAM(s) Oral two times a day before meals  nicotine -  14 mG/24Hr(s) Patch 1 patch Transdermal daily    MEDICATIONS  (PRN):  zolpidem 5 milliGRAM(s) Oral at bedtime PRN Insomnia        PHYSICAL EXAM:    GENERAL: NAD, well-groomed, well-developed  HEAD:  Atraumatic, Normocephalic  EYES: EOMI, PERRLA, conjunctiva and sclera clear  ENMT: Moist mucous membranes  NECK: Supple, No JVD  NERVOUS SYSTEM:  Alert & Oriented X3, Motor Strength 5/5 B/L upper and lower extremities; DTRs 2+ intact and symmetric  CHEST/LUNG: Clear to auscultation bilaterally; No rales, rhonchi, wheezing, or rubs  HEART: Regular rate and rhythm; No murmurs, rubs, or gallops  ABDOMEN: Soft, Nontender, Nondistended; Bowel sounds present  EXTREMITIES:  2+ Peripheral Pulses, No clubbing, cyanosis, or edema    LABS:                        8.1    7.4   )-----------( 324      ( 07 Sep 2017 15:17 )             25.5     09-07    144  |  110<H>  |  8   ----------------------------<  88  3.9   |  27  |  0.71    Ca    7.9<L>      07 Sep 2017 10:12    TPro  6.3  /  Alb  2.9<L>  /  TBili  0.1<L>  /  DBili  x   /  AST  17  /  ALT  20  /  AlkPhos  51  09-05    PT/INR - ( 05 Sep 2017 21:38 )   PT: 12.8 sec;   INR: 1.17 ratio         PTT - ( 05 Sep 2017 21:38 )  PTT:28.5 sec      CAPILLARY BLOOD GLUCOSE          RECENT CULTURES:      RADIOLOGY & ADDITIONAL TESTS:  Imaging Personally Reviewed:  [ ] YES      Consultant(s) Notes Reviewed:  [ ] YES     Care Discussed with [ ] Consultants [X ] Patient [ ] Family  [x ]    [x ]  Other; RN  HEALTH ISSUES - PROBLEM Dx:  Anemia: Anemia  Preventive measure: Preventive measure  Tobacco abuse: Tobacco abuse  Anxiety: Anxiety  Symptomatic anemia: Symptomatic anemia        DVT/GI ppx  Discussed with pt @ bedside

## 2017-09-07 NOTE — PROGRESS NOTE ADULT - SUBJECTIVE AND OBJECTIVE BOX
Gastroenterolgy  Patient seen and examined bedside resting comfortably.  No complaints offered. No abdominal pain  Denies nausea and vomiting. Tolerating diet.  Normal flatus/BM. no active bleeding    T(F): 97.8 (09-07-17 @ 06:50), Max: 98.4 (09-06-17 @ 20:35)  HR: 58 (09-07-17 @ 06:50) (58 - 69)  BP: 124/71 (09-07-17 @ 06:50) (108/66 - 124/71)  RR: 18 (09-07-17 @ 06:50) (17 - 18)  SpO2: 97% (09-07-17 @ 06:50) (96% - 100%)  Wt(kg): --  CAPILLARY BLOOD GLUCOSE          PHYSICAL EXAM:  General: NAD, WDWN.   Neuro:  Alert & oriented x 3  HEENT: NCAT, EOMI, conjunctiva clear  CV: +S1+S2 regular rate and rhythm  Lung: clear to ausculation bilaterally, respirations nonlabored, good inspiratory effort  Abdomen: soft, mildly obese NonTender, No distention Normal active BS  Extremities: no cyanosis, clubbing or edema    LABS:                        7.6    8.6   )-----------( 315      ( 07 Sep 2017 02:24 )             23.7     09-05    141  |  104  |  18  ----------------------------<  100<H>  3.4<L>   |  25  |  0.85    Ca    8.2<L>      05 Sep 2017 21:38    TPro  6.3  /  Alb  2.9<L>  /  TBili  0.1<L>  /  DBili  x   /  AST  17  /  ALT  20  /  AlkPhos  51  09-05    LIVER FUNCTIONS - ( 05 Sep 2017 21:38 )  Alb: 2.9 g/dL / Pro: 6.3 gm/dL / ALK PHOS: 51 U/L / ALT: 20 U/L / AST: 17 U/L / GGT: x           PT/INR - ( 05 Sep 2017 21:38 )   PT: 12.8 sec;   INR: 1.17 ratio         PTT - ( 05 Sep 2017 21:38 )  PTT:28.5 sec  I&O's Detail    06 Sep 2017 07:01  -  07 Sep 2017 07:00  --------------------------------------------------------  IN:    dextrose 5% + sodium chloride 0.9%.: 480 mL    Oral Fluid: 720 mL    Packed Red Blood Cells: 342 mL  Total IN: 1542 mL    OUT:    Voided: 1000 mL  Total OUT: 1000 mL    Total NET: 542 mL        09-05 @ 21:38    141 | 104 | 18  /8.2 | -- | --  _______________________/  3.4 | 25 | 0.85                           \par   Iron Total, Serum: 66 ug/dL (09-06 @ 13:33)

## 2017-09-07 NOTE — PROGRESS NOTE ADULT - PROBLEM SELECTOR PLAN 1
Guaiac negative, No evidence of active bleeding,   extensive GI workup done as outpatient by Dr Brunner . Would consdier Small bowel capsule a s outpatient

## 2017-09-07 NOTE — PROGRESS NOTE ADULT - PROBLEM SELECTOR PLAN 1
Will transfuse 2 units pRBCs now.  Repeat CBC ~4 hours post transfusion.  FOBT neg   anemia workup, Iron, Ferritin, TIBC, B12 are wnl  Telemetry for now  GI Prophylaxis with Protonix 40mg po BID.  resume diet , GI consult  appreciated  discussed with patient need for sb capsule endoscopy  patient also made appointment with hematologist next week  to get copy of labs on discharge Will transfuse 2 units pRBCs now.  Repeat CBC ~4 hours post transfusion.  FOBT neg   anemia workup, Iron, Ferritin, TIBC, B12 are wnl  Telemetry for now  GI Prophylaxis with Protonix 40mg po BID.  resume diet , GI consult  appreciated  discussed with patient need for sb capsule endoscopy  patient also made appointment with hematologist next week  to get copy of labs on discharge  ct of abdomen

## 2017-09-08 ENCOUNTER — TRANSCRIPTION ENCOUNTER (OUTPATIENT)
Age: 62
End: 2017-09-08

## 2017-09-08 VITALS
TEMPERATURE: 98 F | SYSTOLIC BLOOD PRESSURE: 146 MMHG | HEART RATE: 146 BPM | DIASTOLIC BLOOD PRESSURE: 81 MMHG | RESPIRATION RATE: 21 BRPM | OXYGEN SATURATION: 98 %

## 2017-09-08 LAB
HCT VFR BLD CALC: 29.3 % — LOW (ref 39–50)
HGB BLD-MCNC: 9.5 G/DL — LOW (ref 13–17)
MCHC RBC-ENTMCNC: 27.4 PG — SIGNIFICANT CHANGE UP (ref 27–34)
MCHC RBC-ENTMCNC: 32.6 GM/DL — SIGNIFICANT CHANGE UP (ref 32–36)
MCV RBC AUTO: 84 FL — SIGNIFICANT CHANGE UP (ref 80–100)
PLATELET # BLD AUTO: 323 K/UL — SIGNIFICANT CHANGE UP (ref 150–400)
RBC # BLD: 3.48 M/UL — LOW (ref 4.2–5.8)
RBC # FLD: 18.6 % — HIGH (ref 11–15)
WBC # BLD: 9.2 K/UL — SIGNIFICANT CHANGE UP (ref 3.8–10.5)
WBC # FLD AUTO: 9.2 K/UL — SIGNIFICANT CHANGE UP (ref 3.8–10.5)

## 2017-09-08 PROCEDURE — 99239 HOSP IP/OBS DSCHRG MGMT >30: CPT

## 2017-09-08 PROCEDURE — 83020 HEMOGLOBIN ELECTROPHORESIS: CPT | Mod: 26

## 2017-09-08 PROCEDURE — 74177 CT ABD & PELVIS W/CONTRAST: CPT | Mod: 26

## 2017-09-08 RX ORDER — LOSARTAN POTASSIUM 100 MG/1
1 TABLET, FILM COATED ORAL
Qty: 0 | Refills: 0 | COMMUNITY

## 2017-09-08 RX ORDER — PANTOPRAZOLE SODIUM 20 MG/1
1 TABLET, DELAYED RELEASE ORAL
Qty: 0 | Refills: 0 | COMMUNITY
Start: 2017-09-08

## 2017-09-08 RX ORDER — NICOTINE POLACRILEX 2 MG
0 GUM BUCCAL
Qty: 0 | Refills: 0 | COMMUNITY
Start: 2017-09-08

## 2017-09-08 RX ADMIN — Medication 1 PATCH: at 13:39

## 2017-09-08 RX ADMIN — PANTOPRAZOLE SODIUM 40 MILLIGRAM(S): 20 TABLET, DELAYED RELEASE ORAL at 16:25

## 2017-09-08 RX ADMIN — Medication 1 PATCH: at 13:37

## 2017-09-08 RX ADMIN — IOHEXOL 30 MILLILITER(S): 300 INJECTION, SOLUTION INTRAVENOUS at 07:13

## 2017-09-08 RX ADMIN — PANTOPRAZOLE SODIUM 40 MILLIGRAM(S): 20 TABLET, DELAYED RELEASE ORAL at 07:13

## 2017-09-08 NOTE — CONSULT NOTE ADULT - SUBJECTIVE AND OBJECTIVE BOX
Reason for Consultation: Anemia    HPI: Patient is a 62y Male seen on consultatioin for the evaluation and management of Anemia    63 y/o male with History of Anemia 3 yrs ago at [facility],  endoscopy was told had gastric ulcers due to NSAIDS, over 3 yrs has had blood checked and was fine, had Blood check mid-august with a Hb of over 12, had poor prep for coloscopy, comes in with weakness had Hb of around 6.    PAST MEDICAL & SURGICAL HISTORY:  Gastrointestinal ulcer  Anxiety  Seasonal allergies  H/O inguinal hernia repair        MEDICATIONS  (STANDING):  dextrose 5% + sodium chloride 0.9%. 1000 milliLiter(s) (80 mL/Hr) IV Continuous <Continuous>  venlafaxine 25 milliGRAM(s) Oral at bedtime  pantoprazole    Tablet 40 milliGRAM(s) Oral two times a day before meals  nicotine -  14 mG/24Hr(s) Patch 1 patch Transdermal daily    MEDICATIONS  (PRN):  zolpidem 5 milliGRAM(s) Oral at bedtime PRN Insomnia      Allergies    No Known Allergies    Intolerances        SOCIAL HISTORY:    Smoking Status: yes  Alcohol: yes  Marital Status: yes  Occupation: yes    FAMILY HISTORY:  Family history of hypertension (Father)    	    Vital Signs Last 24 Hrs  T(C): 36.6 (08 Sep 2017 12:04), Max: 37.1 (08 Sep 2017 04:50)  T(F): 97.9 (08 Sep 2017 12:04), Max: 98.8 (08 Sep 2017 04:50)  HR: 62 (08 Sep 2017 12:04) (62 - 68)  BP: 129/69 (08 Sep 2017 12:04) (108/60 - 133/62)  BP(mean): --  RR: 20 (08 Sep 2017 12:04) (17 - 20)  SpO2: 98% (08 Sep 2017 12:04) (95% - 98%)    PHYSICAL EXAM:    general - AAO x 3  HEENT - No Icterus  CVS - RRR  RS - AE B/L  Abd - soft, NT  Ext - Pulses +        LABS:                        9.5    9.2   )-----------( 323      ( 08 Sep 2017 07:06 )             29.3     09-07    144  |  110<H>  |  8   ----------------------------<  88  3.9   |  27  |  0.71    Ca    7.9<L>      07 Sep 2017 10:12            RADIOLOGY & ADDITIONAL STUDIES:

## 2017-09-08 NOTE — CHART NOTE - NSCHARTNOTEFT_GEN_A_CORE
Kirt Silverio Was hospitalized at [facility] from 9/25-9/8/17. He is medically cleared for discharge and return to work. Kirt Silverio Was hospitalized at [facility] from 9/5-9/8/17. He is medically cleared for discharge and return to work.

## 2017-09-08 NOTE — CONSULT NOTE ADULT - PROBLEM SELECTOR RECOMMENDATION 9
Guaiac negative, No evidence of active bleeding,   extensive GI workup done as outpatient by Dr Brunner   Recommend Hematology consult
- HB in Mid august over 12 and now around 6 indicative of blood loss more likely  - patient with history of Ulcer Disease  - suggest repeating GI work-up - including upper endoscopy an colonscopy along with capsule endoscopy  - Total Bilirubin low so doubt hemolysis  - offered BM Biopsy - but patient currently refusing wants to rule out bleeding first before doing a BM Biopsy  - retic count, LDH

## 2017-09-08 NOTE — PROGRESS NOTE ADULT - PROBLEM SELECTOR PLAN 1
Guaiac negative, No evidence of active bleeding,   extensive GI workup done as outpatient by Dr Brunner . Will consider Small bowel capsule as outpatient. patient and Dr Brunner are aware of plan  no need for EGD/colon at this time

## 2017-09-08 NOTE — DISCHARGE NOTE ADULT - MEDICATION SUMMARY - MEDICATIONS TO TAKE
I will START or STAY ON the medications listed below when I get home from the hospital:    Effexor  -- 20 milligram(s) by mouth once a day (at bedtime)  -- Indication: For depression    Ambien  -- 5 milligram(s) by mouth prn, As Needed  -- Indication: For insomnia    pantoprazole 40 mg oral delayed release tablet  -- 1 tab(s) by mouth 2 times a day (before meals)  -- Indication: For gerd    nicotine 14 mg/24 hr transdermal film, extended release  --  by transdermal patch   -- Indication: For Smoking

## 2017-09-08 NOTE — DISCHARGE NOTE ADULT - PLAN OF CARE
stable follow up with DR.Brunner as outpatient within 3-4 days   also keep your appointment with hematologist on tuesday continue with home meds repleted life style modifications quit smoking

## 2017-09-08 NOTE — DISCHARGE NOTE ADULT - CARE PLAN
Principal Discharge DX:	Acute blood loss anemia Principal Discharge DX:	Acute blood loss anemia  Goal:	stable  Instructions for follow-up, activity and diet:	follow up with DR.Brunner as outpatient within 3-4 days   also keep your appointment with hematologist on tuesday  Secondary Diagnosis:	Symptomatic anemia  Instructions for follow-up, activity and diet:	follow up with DR.Brunner as outpatient within 3-4 days   also keep your appointment with hematologist on tuesday  Secondary Diagnosis:	Anxiety  Instructions for follow-up, activity and diet:	continue with home meds  Secondary Diagnosis:	Hypokalemia  Instructions for follow-up, activity and diet:	repleted  Secondary Diagnosis:	Obesity (BMI 30.0-34.9)  Instructions for follow-up, activity and diet:	life style modifications  Secondary Diagnosis:	Smoking  Instructions for follow-up, activity and diet:	quit smoking

## 2017-09-08 NOTE — DISCHARGE NOTE ADULT - PATIENT PORTAL LINK FT
“You can access the [facility] Patient Portal, offered by [facility], by registering with the following website: [email or web address]”

## 2017-09-08 NOTE — DISCHARGE NOTE ADULT - CARE PROVIDER_API CALL
Brunner, Robert J (MD), Medicine  [facility]  [address]  [address]  [place] [address]  Phone: [phone]  Fax: [phone]    your, hematologist  Phone: (   )    -  Fax: (   )    -

## 2017-09-08 NOTE — DISCHARGE NOTE ADULT - HOSPITAL COURSE
62 year old man with PMH PUD with bleeding ulcers in the past (was taking NSAIDs for back pain) presents sent in by GI MD for feeling weak and lethargic-h/h 6.2/20.5 in ED.   In Aug 18 2017 ,H/H =12.7/40.8 , negative occult blood , GI consulted-  , s/p 5 units PRBC,   Discussed in length w/ pt at the bedside,  recent extensive GI w/u as outpt, CT abd= no retroperitoneal bleed, no evidence of GI bleed, capsule endoscopy suggested to be done as outpt by GI. Discussed in detail, Hematologist consulted, pt refused BM biopsy.follow up with DR.Brunner as outpatient within 3-4 days   also keep your appointment with hematologist on tuesday

## 2017-09-08 NOTE — DISCHARGE NOTE ADULT - CARE PROVIDERS DIRECT ADDRESSES
[email or web address]

## 2017-09-08 NOTE — PROGRESS NOTE ADULT - SUBJECTIVE AND OBJECTIVE BOX
Gastroenterology  Patient seen and examined bedside resting comfortably.  No complaints offered. No abdominal pain  Denies nausea and vomiting. Tolerating diet.  Normal flatus/BM. no active bleeding SPOKE WITH PATIENT AT Length    T(F): 97.9 (09-08-17 @ 12:04), Max: 98.8 (09-08-17 @ 04:50)  HR: 62 (09-08-17 @ 12:04) (62 - 68)  BP: 129/69 (09-08-17 @ 12:04) (108/60 - 133/62)  RR: 20 (09-08-17 @ 12:04) (17 - 20)  SpO2: 98% (09-08-17 @ 12:04) (95% - 98%)  Wt(kg): --  CAPILLARY BLOOD GLUCOSE          PHYSICAL EXAM:  General: NAD, WDWN.   Neuro:  Alert & oriented x 3  HEENT: NCAT, EOMI, conjunctiva clear  CV: +S1+S2 regular rate and rhythm  Lung: clear to ausculation bilaterally, respirations nonlabored, good inspiratory effort  Abdomen: soft, NonTender, No distention Normal active BS  Extremities: no cyanosis, clubbing or edema    LABS:                        9.5    9.2   )-----------( 323      ( 08 Sep 2017 07:06 )             29.3     09-07    144  |  110<H>  |  8   ----------------------------<  88  3.9   |  27  |  0.71    Ca    7.9<L>      07 Sep 2017 10:12          I&O's Detail    07 Sep 2017 07:01  -  08 Sep 2017 07:00  --------------------------------------------------------  IN:    Oral Fluid: 480 mL    Packed Red Blood Cells: 292 mL  Total IN: 772 mL    OUT:  Total OUT: 0 mL    Total NET: 772 mL      08 Sep 2017 07:01  -  08 Sep 2017 16:07  --------------------------------------------------------  IN:    Oral Fluid: 360 mL  Total IN: 360 mL    OUT:    Voided: 600 mL  Total OUT: 600 mL    Total NET: -240 mL        09-07 @ 10:12    144 | 110 | 8  /7.9 | -- | --  _______________________/  3.9 | 27 | 0.71                           \par

## 2017-09-11 LAB
HEMOGLOBIN INTERPRETATION: SIGNIFICANT CHANGE UP
HGB A MFR BLD: 97.8 % — SIGNIFICANT CHANGE UP (ref 95.8–98)
HGB A2 MFR BLD: 2.2 % — SIGNIFICANT CHANGE UP (ref 2–3.2)

## 2017-09-12 DIAGNOSIS — K25.9 GASTRIC ULCER, UNSPECIFIED AS ACUTE OR CHRONIC, WITHOUT HEMORRHAGE OR PERFORATION: ICD-10-CM

## 2017-09-12 DIAGNOSIS — Z53.29 PROCEDURE AND TREATMENT NOT CARRIED OUT BECAUSE OF PATIENT'S DECISION FOR OTHER REASONS: ICD-10-CM

## 2017-09-12 DIAGNOSIS — D64.9 ANEMIA, UNSPECIFIED: ICD-10-CM

## 2017-09-12 DIAGNOSIS — D62 ACUTE POSTHEMORRHAGIC ANEMIA: ICD-10-CM

## 2017-09-12 DIAGNOSIS — F17.200 NICOTINE DEPENDENCE, UNSPECIFIED, UNCOMPLICATED: ICD-10-CM

## 2017-09-12 DIAGNOSIS — G47.00 INSOMNIA, UNSPECIFIED: ICD-10-CM

## 2017-09-12 DIAGNOSIS — E87.6 HYPOKALEMIA: ICD-10-CM

## 2017-09-12 DIAGNOSIS — Z28.21 IMMUNIZATION NOT CARRIED OUT BECAUSE OF PATIENT REFUSAL: ICD-10-CM

## 2017-09-12 DIAGNOSIS — D50.9 IRON DEFICIENCY ANEMIA, UNSPECIFIED: ICD-10-CM

## 2017-09-12 DIAGNOSIS — E66.9 OBESITY, UNSPECIFIED: ICD-10-CM

## 2017-09-12 DIAGNOSIS — F41.9 ANXIETY DISORDER, UNSPECIFIED: ICD-10-CM

## 2017-09-17 ENCOUNTER — INPATIENT (INPATIENT)
Facility: HOSPITAL | Age: 62
LOS: 13 days | Discharge: HOME | End: 2017-10-01
Attending: GENERAL PRACTICE | Admitting: GENERAL PRACTICE
Payer: COMMERCIAL

## 2017-09-17 VITALS
DIASTOLIC BLOOD PRESSURE: 51 MMHG | RESPIRATION RATE: 17 BRPM | SYSTOLIC BLOOD PRESSURE: 108 MMHG | HEART RATE: 77 BPM | OXYGEN SATURATION: 97 % | TEMPERATURE: 98 F

## 2017-09-17 DIAGNOSIS — F41.9 ANXIETY DISORDER, UNSPECIFIED: ICD-10-CM

## 2017-09-17 DIAGNOSIS — M54.9 DORSALGIA, UNSPECIFIED: ICD-10-CM

## 2017-09-17 DIAGNOSIS — Z29.9 ENCOUNTER FOR PROPHYLACTIC MEASURES, UNSPECIFIED: ICD-10-CM

## 2017-09-17 DIAGNOSIS — Z98.89 OTHER SPECIFIED POSTPROCEDURAL STATES: Chronic | ICD-10-CM

## 2017-09-17 DIAGNOSIS — I10 ESSENTIAL (PRIMARY) HYPERTENSION: ICD-10-CM

## 2017-09-17 DIAGNOSIS — D64.9 ANEMIA, UNSPECIFIED: ICD-10-CM

## 2017-09-17 DIAGNOSIS — K92.2 GASTROINTESTINAL HEMORRHAGE, UNSPECIFIED: ICD-10-CM

## 2017-09-17 LAB
ALBUMIN SERPL ELPH-MCNC: 3.3 G/DL — SIGNIFICANT CHANGE UP (ref 3.3–5)
ALP SERPL-CCNC: 38 U/L — LOW (ref 40–120)
ALT FLD-CCNC: 14 U/L — SIGNIFICANT CHANGE UP (ref 4–41)
APTT BLD: 26.8 SEC — LOW (ref 27.5–37.4)
AST SERPL-CCNC: 14 U/L — SIGNIFICANT CHANGE UP (ref 4–40)
BASE EXCESS BLDV CALC-SCNC: 0.7 MMOL/L — SIGNIFICANT CHANGE UP
BASOPHILS # BLD AUTO: 0.03 K/UL — SIGNIFICANT CHANGE UP (ref 0–0.2)
BASOPHILS NFR BLD AUTO: 0.3 % — SIGNIFICANT CHANGE UP (ref 0–2)
BILIRUB SERPL-MCNC: < 0.2 MG/DL — LOW (ref 0.2–1.2)
BLD GP AB SCN SERPL QL: NEGATIVE — SIGNIFICANT CHANGE UP
BLOOD GAS VENOUS - CREATININE: 0.74 MG/DL — SIGNIFICANT CHANGE UP (ref 0.5–1.3)
BUN SERPL-MCNC: 26 MG/DL — HIGH (ref 7–23)
CALCIUM SERPL-MCNC: 8.4 MG/DL — SIGNIFICANT CHANGE UP (ref 8.4–10.5)
CHLORIDE BLDV-SCNC: 105 MMOL/L — SIGNIFICANT CHANGE UP (ref 96–108)
CHLORIDE SERPL-SCNC: 104 MMOL/L — SIGNIFICANT CHANGE UP (ref 98–107)
CO2 SERPL-SCNC: 25 MMOL/L — SIGNIFICANT CHANGE UP (ref 22–31)
CREAT SERPL-MCNC: 0.64 MG/DL — SIGNIFICANT CHANGE UP (ref 0.5–1.3)
EOSINOPHIL # BLD AUTO: 0.03 K/UL — SIGNIFICANT CHANGE UP (ref 0–0.5)
EOSINOPHIL NFR BLD AUTO: 0.3 % — SIGNIFICANT CHANGE UP (ref 0–6)
GAS PNL BLDV: 135 MMOL/L — LOW (ref 136–146)
GLUCOSE BLDV-MCNC: 92 — SIGNIFICANT CHANGE UP (ref 70–99)
GLUCOSE SERPL-MCNC: 89 MG/DL — SIGNIFICANT CHANGE UP (ref 70–99)
HCO3 BLDV-SCNC: 25 MMOL/L — SIGNIFICANT CHANGE UP (ref 20–27)
HCT VFR BLD CALC: 17.3 % — CRITICAL LOW (ref 39–50)
HCT VFR BLDV CALC: 16.1 % — CRITICAL LOW (ref 39–51)
HGB BLD-MCNC: 4.9 G/DL — CRITICAL LOW (ref 13–17)
HGB BLDV-MCNC: 5.1 G/DL — CRITICAL LOW (ref 13–17)
IMM GRANULOCYTES # BLD AUTO: 0.04 # — SIGNIFICANT CHANGE UP
IMM GRANULOCYTES NFR BLD AUTO: 0.4 % — SIGNIFICANT CHANGE UP (ref 0–1.5)
INR BLD: 1.1 — SIGNIFICANT CHANGE UP (ref 0.88–1.17)
LACTATE BLDV-MCNC: 1 MMOL/L — SIGNIFICANT CHANGE UP (ref 0.5–2)
LYMPHOCYTES # BLD AUTO: 1.22 K/UL — SIGNIFICANT CHANGE UP (ref 1–3.3)
LYMPHOCYTES # BLD AUTO: 13.2 % — SIGNIFICANT CHANGE UP (ref 13–44)
MCHC RBC-ENTMCNC: 23.2 PG — LOW (ref 27–34)
MCHC RBC-ENTMCNC: 28.3 % — LOW (ref 32–36)
MCV RBC AUTO: 82 FL — SIGNIFICANT CHANGE UP (ref 80–100)
MONOCYTES # BLD AUTO: 0.98 K/UL — HIGH (ref 0–0.9)
MONOCYTES NFR BLD AUTO: 10.6 % — SIGNIFICANT CHANGE UP (ref 2–14)
NEUTROPHILS # BLD AUTO: 6.93 K/UL — SIGNIFICANT CHANGE UP (ref 1.8–7.4)
NEUTROPHILS NFR BLD AUTO: 75.2 % — SIGNIFICANT CHANGE UP (ref 43–77)
NRBC # FLD: 0 — SIGNIFICANT CHANGE UP
OB PNL STL: POSITIVE — SIGNIFICANT CHANGE UP
PCO2 BLDV: 45 MMHG — SIGNIFICANT CHANGE UP (ref 41–51)
PH BLDV: 7.37 PH — SIGNIFICANT CHANGE UP (ref 7.32–7.43)
PLATELET # BLD AUTO: 276 K/UL — SIGNIFICANT CHANGE UP (ref 150–400)
PMV BLD: 9.7 FL — SIGNIFICANT CHANGE UP (ref 7–13)
PO2 BLDV: 35 MMHG — SIGNIFICANT CHANGE UP (ref 35–40)
POTASSIUM BLDV-SCNC: 4.1 MMOL/L — SIGNIFICANT CHANGE UP (ref 3.4–4.5)
POTASSIUM SERPL-MCNC: 4.3 MMOL/L — SIGNIFICANT CHANGE UP (ref 3.5–5.3)
POTASSIUM SERPL-SCNC: 4.3 MMOL/L — SIGNIFICANT CHANGE UP (ref 3.5–5.3)
PROT SERPL-MCNC: 5.2 G/DL — LOW (ref 6–8.3)
PROTHROM AB SERPL-ACNC: 12.4 SEC — SIGNIFICANT CHANGE UP (ref 9.8–13.1)
RBC # BLD: 2.11 M/UL — LOW (ref 4.2–5.8)
RBC # FLD: 19.9 % — HIGH (ref 10.3–14.5)
RH IG SCN BLD-IMP: NEGATIVE — SIGNIFICANT CHANGE UP
SAO2 % BLDV: 58.9 % — LOW (ref 60–85)
SODIUM SERPL-SCNC: 138 MMOL/L — SIGNIFICANT CHANGE UP (ref 135–145)
WBC # BLD: 9.23 K/UL — SIGNIFICANT CHANGE UP (ref 3.8–10.5)
WBC # FLD AUTO: 9.23 K/UL — SIGNIFICANT CHANGE UP (ref 3.8–10.5)

## 2017-09-17 PROCEDURE — 99223 1ST HOSP IP/OBS HIGH 75: CPT

## 2017-09-17 PROCEDURE — 71020: CPT | Mod: 26

## 2017-09-17 PROCEDURE — 99223 1ST HOSP IP/OBS HIGH 75: CPT | Mod: GC

## 2017-09-17 RX ORDER — VENLAFAXINE HCL 75 MG
37.5 CAPSULE, EXT RELEASE 24 HR ORAL AT BEDTIME
Qty: 0 | Refills: 0 | Status: DISCONTINUED | OUTPATIENT
Start: 2017-09-17 | End: 2017-10-01

## 2017-09-17 RX ORDER — LOSARTAN POTASSIUM 100 MG/1
0.5 TABLET, FILM COATED ORAL
Qty: 0 | Refills: 0 | COMMUNITY

## 2017-09-17 RX ORDER — PANTOPRAZOLE SODIUM 20 MG/1
40 TABLET, DELAYED RELEASE ORAL ONCE
Qty: 0 | Refills: 0 | Status: COMPLETED | OUTPATIENT
Start: 2017-09-17 | End: 2017-09-17

## 2017-09-17 RX ORDER — PANTOPRAZOLE SODIUM 20 MG/1
8 TABLET, DELAYED RELEASE ORAL
Qty: 80 | Refills: 0 | Status: DISCONTINUED | OUTPATIENT
Start: 2017-09-17 | End: 2017-09-18

## 2017-09-17 RX ORDER — ALPRAZOLAM 0.25 MG
0.25 TABLET ORAL AT BEDTIME
Qty: 0 | Refills: 0 | Status: DISCONTINUED | OUTPATIENT
Start: 2017-09-17 | End: 2017-09-24

## 2017-09-17 RX ADMIN — PANTOPRAZOLE SODIUM 40 MILLIGRAM(S): 20 TABLET, DELAYED RELEASE ORAL at 23:10

## 2017-09-17 NOTE — ED ADULT NURSE REASSESSMENT NOTE - NS ED NURSE REASSESS COMMENT FT1
Pt transferred to floor after 2 units PRBC and IVP protonix.  Denies fever/chills, chest pain/palpitations, SOB,  abdominal pain, N/V/D

## 2017-09-17 NOTE — H&P ADULT - PROBLEM SELECTOR PLAN 1
HX of PUD, Symptomatic anemia, Possible source GI Blood loss, has had extensive w/u by GI and Hem , no clear source yet, Occult stool +,   GI Consult house, Hem consult, PRBC x 3 tonight, Protonix Drip, NPO Except meds  Fall/aspiration precaution, F/U CBC Closely, CMP,   AVOID NSAIDS, Needs to Obtain  Capsule Endoscopy Result, Pt may need CT Angio abdomen or Bleeding scan if no source can be identified,

## 2017-09-17 NOTE — H&P ADULT - NEGATIVE GASTROINTESTINAL SYMPTOMS
no melena/no abdominal pain/no nausea/no hematochezia/no hiccoughs/no diarrhea/no jaundice/no vomiting/no constipation

## 2017-09-17 NOTE — H&P ADULT - FAMILY HISTORY
Mother  Still living? No  Family history of colon cancer, Age at diagnosis: Age Unknown     Father  Still living? No  Family history of liver cancer, Age at diagnosis: Age Unknown

## 2017-09-17 NOTE — ED PROVIDER NOTE - OBJECTIVE STATEMENT
62M PMH PUD with hx of bleeding ulcers in the past, d/c'd 8 days ago from [facility] after admission for anemia sent back to ED for low H/H. Pt was transfused 5U PRBC during recent admission. Had negative occult blood at that time and capsule endoscopy was decided on for further w/u. Seen by GI and Hematology, but refused bone marrow biopsy. H/H 8/18/17 12/7/40/8; H/H upon admission in ED 6.2/20.5. 62M PMH PUD with hx of bleeding ulcers in the past, d/c'd 8 days ago from [facility] after admission for anemia sent back to ED for low H/H. Pt was transfused 5U PRBC during recent admission. Had negative occult blood at that time and capsule endoscopy was decided on for further w/u. Seen by GI and Hematology, but refused bone marrow biopsy. H/H 8/18/17 12/7/40/8; H/H upon admission in ED 6.2/20.5. Not on blood thinners. Unclear source for blood loss/etiology of anemia. 62M PMH PUD with hx of bleeding ulcers in the past, d/c'd 8 days ago from [place] after admission for anemia sent back to ED for generalized weakness, fatigue, exertional SOB. Pt was transfused 5U PRBC during recent admission. Had negative occult blood at that time and capsule endoscopy was decided on for further w/u. Turned in capsule 2 days ago but no results yet. Seen by GI and Hematology during recent admission, but refused bone marrow biopsy. H/H 8/18/17 12/7/40/8; H/H upon admission in ED 6.2/20.5. Not on blood thinners. Unclear source for blood loss/etiology of anemia, suspected to be GI in nature. Pt denies chest pain or syncope, states has felt worsening fatigue and SOB. Called his hematologist Dmary Huang) and advised to go to ED.

## 2017-09-17 NOTE — ED PROVIDER NOTE - MEDICAL DECISION MAKING DETAILS
62M with generalized weakness, lethargy, fatigue and decreased exercise tolerance in setting of severe anemia being worked up in outpatient setting, requiring multiple blood transfusions.

## 2017-09-17 NOTE — H&P ADULT - PMH
Anxiety    Gastrointestinal ulcer    Seasonal allergies Anxiety    Back pain    Gastrointestinal ulcer    HTN (hypertension)    Obesity    Seasonal allergies

## 2017-09-17 NOTE — ED PROVIDER NOTE - PROGRESS NOTE DETAILS
H/H 4.9/17.3. Occult ordered, consented for blood, 3U PRBC ordered. Pt's hematologist Dr. Huang paged at [phone]. Message left. Spoke with Dr. Ortiz, would like pt admitted under Dr. Santos. Dr. Santos paged x 1. Dr. Santos paged x 2. Accepted for admission to Dr. Santos. MAR textpage sent.

## 2017-09-17 NOTE — H&P ADULT - MUSCULOSKELETAL
details… detailed exam no joint swelling/no calf tenderness/ROM intact/normal strength/no joint erythema/no joint warmth

## 2017-09-17 NOTE — H&P ADULT - HISTORY OF PRESENT ILLNESS
63 y/o Male  PMH PUD with hx of bleeding ulcers in the past, d/c'd 8 days ago from [place] after admission for anemia sent back to ED for generalized weakness, fatigue, exertional SOB. Pt was transfused 5U PRBC during recent admission. Had negative occult blood at that time and capsule endoscopy was decided on for further w/u. Turned in capsule 2 days ago but no results yet. Seen by GI and Hematology during recent admission, but refused bone marrow biopsy. H/H 8/18/17 12/7/40/8; H/H upon admission in ED 6.2/20.5. Not on blood thinners. Unclear source for blood loss/etiology of anemia, suspected to be GI in nature. Pt denies chest pain or syncope, states has felt worsening fatigue and SOB. Called his hematologist Oksana Huang) and advised to go to ED. 63 y/o Male  PMH PUD with hx of bleeding ulcers in the past, d/c'd 8 days ago from [place] after admission for anemia sent back to ED for generalized weakness, fatigue, exertional SOB. Pt was transfused 5U PRBC during recent admission. Had negative occult blood at that time and capsule endoscopy was decided on for further w/u. Turned in capsule 2 days ago but no results yet. Seen by GI and Hematology during recent admission, but refused bone marrow biopsy. H/H 8/18/17 12/7/40/8; H/H upon admission in ED 6.2/20.5. Not on blood thinners. Unclear source for blood loss/etiology of anemia, suspected to be GI in nature. Pt denies chest pain or syncope, states has felt worsening fatigue and SOB. Called his hematologist Dmary Huang) and advised to go to ED. Last EGD July 2017, last Colonoscopy Aug. 2017, as outpatient, S/P CT A/P as well No clear source of bleeding was identified, pt denies any NSAID use for now, no blood in stool reported, no cough, no fever, no N/V, no abdominal pain, no wt Loss, no dysuria, pt's Hgb 4.9 tonight dropped  from 9.5 ( Sep. 8, 2017 ), pt is getting PRBC x 3 tonight, IV Protonix, A+O x 3, no distress, NO HA, Occult stool + in the ER tonight,   Labs: Occult Stool +, Na 138, K+ 4.3, BUN  26, Creatinine 0.64, Glucose 89, LFT Normal, WBC 9.23, Hgb 4.9, MCV 82, Platelet 276, PT 12.4, INR 1.1, PTT 26.8   Vitals: Tem 98.7, HR 71, /76, RR 18, 100 % RA

## 2017-09-17 NOTE — ED ADULT NURSE NOTE - OBJECTIVE STATEMENT
Pt received to rm #1.  A+Ox3.  skin warm and dry.  resp even,[id number].  color pale.  c/o fatigue and generalized weakness.  denies active bleeding.  h/o anemia since 9/5 with multiple blood transfusions recently.  "I don't feel well." seen and eval'd by md.  SL placed.  labs sent.  VSS. continuous monitoring in progress.  report to primary rn.

## 2017-09-17 NOTE — H&P ADULT - ATTENDING COMMENTS
pt was seen & examined by me, Dr. ES Steward on 9/17/17.     Dr. Santos will resume the care of pt in AM.

## 2017-09-17 NOTE — ED PROVIDER NOTE - ATTENDING CONTRIBUTION TO CARE
Attending Statement: I have personally seen and examined this patient. I have fully participated in the care of this patient. I have reviewed all pertinent clinical information, including history physical exam, plan and the Resident's note and agree except as noted  63yo M hx PUD w bleeding ulcer in the past, anemia w recent transfusion pw "not feeling well"   Pt  states he was admitted on 9-5-17 for four days was given 5 units of PRBC, last week followed up hematology and gastroenterology. States that occult stool was neg, had a swallow capsule test two days ago, unknown results. Over the last two days endorses sob, chris, fatigue and generalized weakness. no melena or BRBPR. no hematochezia   no chest pain no palpitations. Called pmd told to come to ED as per pt.   no other med hx. NDKA social ETOH  Vital signs noted. sitting up no distress nontoxic. pale. normal S1-S2 No resp distress. able to speak in full and clear sentences. no wheeze, rales or stridor. soft nontender abdomen. no  rebound. no guarding. no sign of trauma. no CVAT   plan ekg, labs, cxr, call pmd

## 2017-09-17 NOTE — ED ADULT TRIAGE NOTE - CHIEF COMPLAINT QUOTE
pt sent in by pmd for low h/h. pt received 5 units of PRBC's last week. pt c/o dizziness and weakness. pt pale in triage. states "I swallowed a camera on friday to find out where i'm bleeding from"

## 2017-09-17 NOTE — H&P ADULT - ASSESSMENT
63 y/o Male  PMH PUD with hx of bleeding ulcers in the past, d/c'd 8 days ago from [place] after admission for anemia sent back to ED for generalized weakness, fatigue, exertional SOB. Pt was transfused 5U PRBC during recent admission. Had negative occult blood at that time and capsule endoscopy was decided on for further w/u. Turned in capsule 2 days ago but no results yet. Seen by GI and Hematology during recent admission, but refused bone marrow biopsy. H/H 8/18/17 12/7/40/8; H/H upon admission in ED 6.2/20.5. Not on blood thinners. Unclear source for blood loss/etiology of anemia, suspected to be GI in nature. Pt denies chest pain or syncope, states has felt worsening fatigue and SOB. Called his hematologist Dmary Huang) and advised to go to ED. Last EGD July 2017, last Colonoscopy Aug. 2017, as outpatient, S/P CT A/P as well No clear source of bleeding was identified, pt denies any NSAID use for now, no blood in stool reported, no cough, no fever, no N/V, no abdominal pain, no wt Loss, no dysuria, pt's Hgb 4.9 tonight dropped  from 9.5 ( Sep. 8, 2017 ), pt is getting PRBC x 3 tonight, IV Protonix, A+O x 3, no distress, NO HA, Occult stool + in the ER tonight,

## 2017-09-17 NOTE — H&P ADULT - NSHPLABSRESULTS_GEN_ALL_CORE
Chest X ray prelim: New opacity in the lower lobe only seen on the lateral view which may represent atelectasis and/or pneumonia.    ECG: NSR @ 76, no acute ST, T changes noted

## 2017-09-18 DIAGNOSIS — I10 ESSENTIAL (PRIMARY) HYPERTENSION: ICD-10-CM

## 2017-09-18 DIAGNOSIS — K92.1 MELENA: ICD-10-CM

## 2017-09-18 DIAGNOSIS — R93.8 ABNORMAL FINDINGS ON DIAGNOSTIC IMAGING OF OTHER SPECIFIED BODY STRUCTURES: ICD-10-CM

## 2017-09-18 LAB
ALBUMIN SERPL ELPH-MCNC: 3.1 G/DL — LOW (ref 3.3–5)
ALP SERPL-CCNC: 35 U/L — LOW (ref 40–120)
ALT FLD-CCNC: 13 U/L — SIGNIFICANT CHANGE UP (ref 4–41)
APPEARANCE UR: CLEAR — SIGNIFICANT CHANGE UP
APTT BLD: 27.1 SEC — LOW (ref 27.5–37.4)
AST SERPL-CCNC: 12 U/L — SIGNIFICANT CHANGE UP (ref 4–40)
BASOPHILS # BLD AUTO: 0.06 K/UL — SIGNIFICANT CHANGE UP (ref 0–0.2)
BASOPHILS # BLD AUTO: 0.06 K/UL — SIGNIFICANT CHANGE UP (ref 0–0.2)
BASOPHILS NFR BLD AUTO: 0.7 % — SIGNIFICANT CHANGE UP (ref 0–2)
BASOPHILS NFR BLD AUTO: 0.7 % — SIGNIFICANT CHANGE UP (ref 0–2)
BILIRUB SERPL-MCNC: 0.8 MG/DL — SIGNIFICANT CHANGE UP (ref 0.2–1.2)
BILIRUB UR-MCNC: NEGATIVE — SIGNIFICANT CHANGE UP
BLOOD UR QL VISUAL: NEGATIVE — SIGNIFICANT CHANGE UP
BUN SERPL-MCNC: 24 MG/DL — HIGH (ref 7–23)
CALCIUM SERPL-MCNC: 7.8 MG/DL — LOW (ref 8.4–10.5)
CHLORIDE SERPL-SCNC: 106 MMOL/L — SIGNIFICANT CHANGE UP (ref 98–107)
CHOLEST SERPL-MCNC: 116 MG/DL — LOW (ref 120–199)
CK SERPL-CCNC: 48 U/L — SIGNIFICANT CHANGE UP (ref 30–200)
CO2 SERPL-SCNC: 24 MMOL/L — SIGNIFICANT CHANGE UP (ref 22–31)
COLOR SPEC: SIGNIFICANT CHANGE UP
CREAT SERPL-MCNC: 0.78 MG/DL — SIGNIFICANT CHANGE UP (ref 0.5–1.3)
EOSINOPHIL # BLD AUTO: 0.12 K/UL — SIGNIFICANT CHANGE UP (ref 0–0.5)
EOSINOPHIL # BLD AUTO: 0.14 K/UL — SIGNIFICANT CHANGE UP (ref 0–0.5)
EOSINOPHIL NFR BLD AUTO: 1.5 % — SIGNIFICANT CHANGE UP (ref 0–6)
EOSINOPHIL NFR BLD AUTO: 1.7 % — SIGNIFICANT CHANGE UP (ref 0–6)
GLUCOSE SERPL-MCNC: 88 MG/DL — SIGNIFICANT CHANGE UP (ref 70–99)
GLUCOSE UR-MCNC: NEGATIVE — SIGNIFICANT CHANGE UP
HAPTOGLOB SERPL-MCNC: 137 MG/DL — SIGNIFICANT CHANGE UP (ref 34–200)
HAV IGM SER-ACNC: NONREACTIVE — SIGNIFICANT CHANGE UP
HBA1C BLD-MCNC: 6.2 % — HIGH (ref 4–5.6)
HBV CORE IGM SER-ACNC: NONREACTIVE — SIGNIFICANT CHANGE UP
HBV SURFACE AG SER-ACNC: NONREACTIVE — SIGNIFICANT CHANGE UP
HCT VFR BLD CALC: 20.6 % — CRITICAL LOW (ref 39–50)
HCT VFR BLD CALC: 20.8 % — CRITICAL LOW (ref 39–50)
HCT VFR BLD CALC: 20.8 % — CRITICAL LOW (ref 39–50)
HCT VFR BLD CALC: 22.7 % — LOW (ref 39–50)
HCT VFR BLD CALC: 24.3 % — LOW (ref 39–50)
HCV AB S/CO SERPL IA: 0.09 S/CO — SIGNIFICANT CHANGE UP
HCV AB SERPL-IMP: SIGNIFICANT CHANGE UP
HDLC SERPL-MCNC: 26 MG/DL — LOW (ref 35–55)
HGB BLD-MCNC: 6.5 G/DL — CRITICAL LOW (ref 13–17)
HGB BLD-MCNC: 6.6 G/DL — CRITICAL LOW (ref 13–17)
HGB BLD-MCNC: 6.6 G/DL — CRITICAL LOW (ref 13–17)
HGB BLD-MCNC: 7.2 G/DL — LOW (ref 13–17)
HGB BLD-MCNC: 7.8 G/DL — LOW (ref 13–17)
HIV 1+2 AB+HIV1 P24 AG SERPL QL IA: SIGNIFICANT CHANGE UP
HIV1 AG SER QL: SIGNIFICANT CHANGE UP
HIV1+2 AB SPEC QL: SIGNIFICANT CHANGE UP
IMM GRANULOCYTES # BLD AUTO: 0.06 # — SIGNIFICANT CHANGE UP
IMM GRANULOCYTES # BLD AUTO: 0.07 # — SIGNIFICANT CHANGE UP
IMM GRANULOCYTES NFR BLD AUTO: 0.7 % — SIGNIFICANT CHANGE UP (ref 0–1.5)
IMM GRANULOCYTES NFR BLD AUTO: 0.9 % — SIGNIFICANT CHANGE UP (ref 0–1.5)
INR BLD: 1.03 — SIGNIFICANT CHANGE UP (ref 0.88–1.17)
KETONES UR-MCNC: NEGATIVE — SIGNIFICANT CHANGE UP
LDH SERPL L TO P-CCNC: 96 U/L — LOW (ref 135–225)
LEUKOCYTE ESTERASE UR-ACNC: NEGATIVE — SIGNIFICANT CHANGE UP
LIPID PNL WITH DIRECT LDL SERPL: 80 MG/DL — SIGNIFICANT CHANGE UP
LYMPHOCYTES # BLD AUTO: 1.49 K/UL — SIGNIFICANT CHANGE UP (ref 1–3.3)
LYMPHOCYTES # BLD AUTO: 1.76 K/UL — SIGNIFICANT CHANGE UP (ref 1–3.3)
LYMPHOCYTES # BLD AUTO: 18.5 % — SIGNIFICANT CHANGE UP (ref 13–44)
LYMPHOCYTES # BLD AUTO: 21.6 % — SIGNIFICANT CHANGE UP (ref 13–44)
MAGNESIUM SERPL-MCNC: 1.8 MG/DL — SIGNIFICANT CHANGE UP (ref 1.6–2.6)
MCHC RBC-ENTMCNC: 25.3 PG — LOW (ref 27–34)
MCHC RBC-ENTMCNC: 25.4 PG — LOW (ref 27–34)
MCHC RBC-ENTMCNC: 26 PG — LOW (ref 27–34)
MCHC RBC-ENTMCNC: 26 PG — LOW (ref 27–34)
MCHC RBC-ENTMCNC: 26.1 PG — LOW (ref 27–34)
MCHC RBC-ENTMCNC: 31.6 % — LOW (ref 32–36)
MCHC RBC-ENTMCNC: 31.7 % — LOW (ref 32–36)
MCHC RBC-ENTMCNC: 32.1 % — SIGNIFICANT CHANGE UP (ref 32–36)
MCV RBC AUTO: 80.2 FL — SIGNIFICANT CHANGE UP (ref 80–100)
MCV RBC AUTO: 80.2 FL — SIGNIFICANT CHANGE UP (ref 80–100)
MCV RBC AUTO: 81.3 FL — SIGNIFICANT CHANGE UP (ref 80–100)
MCV RBC AUTO: 81.9 FL — SIGNIFICANT CHANGE UP (ref 80–100)
MCV RBC AUTO: 81.9 FL — SIGNIFICANT CHANGE UP (ref 80–100)
MONOCYTES # BLD AUTO: 0.85 K/UL — SIGNIFICANT CHANGE UP (ref 0–0.9)
MONOCYTES # BLD AUTO: 0.88 K/UL — SIGNIFICANT CHANGE UP (ref 0–0.9)
MONOCYTES NFR BLD AUTO: 10.5 % — SIGNIFICANT CHANGE UP (ref 2–14)
MONOCYTES NFR BLD AUTO: 10.9 % — SIGNIFICANT CHANGE UP (ref 2–14)
MUCOUS THREADS # UR AUTO: SIGNIFICANT CHANGE UP
NEUTROPHILS # BLD AUTO: 5.28 K/UL — SIGNIFICANT CHANGE UP (ref 1.8–7.4)
NEUTROPHILS # BLD AUTO: 5.4 K/UL — SIGNIFICANT CHANGE UP (ref 1.8–7.4)
NEUTROPHILS NFR BLD AUTO: 65 % — SIGNIFICANT CHANGE UP (ref 43–77)
NEUTROPHILS NFR BLD AUTO: 67.3 % — SIGNIFICANT CHANGE UP (ref 43–77)
NITRITE UR-MCNC: NEGATIVE — SIGNIFICANT CHANGE UP
NRBC # FLD: 0.02 — SIGNIFICANT CHANGE UP
NRBC # FLD: 0.02 — SIGNIFICANT CHANGE UP
NRBC # FLD: 0.03 — SIGNIFICANT CHANGE UP
NRBC # FLD: 0.05 — SIGNIFICANT CHANGE UP
NRBC # FLD: 0.05 — SIGNIFICANT CHANGE UP
PH UR: 6.5 — SIGNIFICANT CHANGE UP (ref 4.6–8)
PHOSPHATE SERPL-MCNC: 3.4 MG/DL — SIGNIFICANT CHANGE UP (ref 2.5–4.5)
PLATELET # BLD AUTO: 237 K/UL — SIGNIFICANT CHANGE UP (ref 150–400)
PLATELET # BLD AUTO: 261 K/UL — SIGNIFICANT CHANGE UP (ref 150–400)
PLATELET # BLD AUTO: 265 K/UL — SIGNIFICANT CHANGE UP (ref 150–400)
PLATELET # BLD AUTO: 265 K/UL — SIGNIFICANT CHANGE UP (ref 150–400)
PLATELET # BLD AUTO: 290 K/UL — SIGNIFICANT CHANGE UP (ref 150–400)
PMV BLD: 10 FL — SIGNIFICANT CHANGE UP (ref 7–13)
PMV BLD: 10.2 FL — SIGNIFICANT CHANGE UP (ref 7–13)
PMV BLD: 9.6 FL — SIGNIFICANT CHANGE UP (ref 7–13)
PMV BLD: 9.7 FL — SIGNIFICANT CHANGE UP (ref 7–13)
PMV BLD: 9.7 FL — SIGNIFICANT CHANGE UP (ref 7–13)
POTASSIUM SERPL-MCNC: 4.5 MMOL/L — SIGNIFICANT CHANGE UP (ref 3.5–5.3)
POTASSIUM SERPL-SCNC: 4.5 MMOL/L — SIGNIFICANT CHANGE UP (ref 3.5–5.3)
PROT SERPL-MCNC: 4.4 G/DL — LOW (ref 6–8.3)
PROT UR-MCNC: NEGATIVE — SIGNIFICANT CHANGE UP
PROTHROM AB SERPL-ACNC: 11.5 SEC — SIGNIFICANT CHANGE UP (ref 9.8–13.1)
RBC # BLD: 2.54 M/UL — LOW (ref 4.2–5.8)
RBC # BLD: 2.54 M/UL — LOW (ref 4.2–5.8)
RBC # BLD: 2.57 M/UL — LOW (ref 4.2–5.8)
RBC # BLD: 2.83 M/UL — LOW (ref 4.2–5.8)
RBC # BLD: 2.99 M/UL — LOW (ref 4.2–5.8)
RBC # FLD: 17.2 % — HIGH (ref 10.3–14.5)
RBC # FLD: 17.4 % — HIGH (ref 10.3–14.5)
RBC # FLD: 17.4 % — HIGH (ref 10.3–14.5)
RBC # FLD: 17.6 % — HIGH (ref 10.3–14.5)
RBC # FLD: 17.7 % — HIGH (ref 10.3–14.5)
RBC CASTS # UR COMP ASSIST: SIGNIFICANT CHANGE UP (ref 0–?)
RETICS #: 0.2 10X6/UL — HIGH (ref 0.02–0.07)
RETICS/RBC NFR: 7 % — HIGH (ref 0.5–2.5)
SODIUM SERPL-SCNC: 140 MMOL/L — SIGNIFICANT CHANGE UP (ref 135–145)
SP GR SPEC: 1.01 — SIGNIFICANT CHANGE UP (ref 1–1.03)
T4 FREE SERPL-MCNC: 1 NG/DL — SIGNIFICANT CHANGE UP (ref 0.9–1.8)
TRIGL SERPL-MCNC: 71 MG/DL — SIGNIFICANT CHANGE UP (ref 10–149)
TSH SERPL-MCNC: 1.6 UIU/ML — SIGNIFICANT CHANGE UP (ref 0.27–4.2)
UROBILINOGEN FLD QL: NORMAL E.U. — SIGNIFICANT CHANGE UP (ref 0.1–0.2)
WBC # BLD: 8.04 K/UL — SIGNIFICANT CHANGE UP (ref 3.8–10.5)
WBC # BLD: 8.13 K/UL — SIGNIFICANT CHANGE UP (ref 3.8–10.5)
WBC # BLD: 8.13 K/UL — SIGNIFICANT CHANGE UP (ref 3.8–10.5)
WBC # BLD: 8.64 K/UL — SIGNIFICANT CHANGE UP (ref 3.8–10.5)
WBC # BLD: 9.14 K/UL — SIGNIFICANT CHANGE UP (ref 3.8–10.5)
WBC # FLD AUTO: 8.04 K/UL — SIGNIFICANT CHANGE UP (ref 3.8–10.5)
WBC # FLD AUTO: 8.13 K/UL — SIGNIFICANT CHANGE UP (ref 3.8–10.5)
WBC # FLD AUTO: 8.13 K/UL — SIGNIFICANT CHANGE UP (ref 3.8–10.5)
WBC # FLD AUTO: 8.64 K/UL — SIGNIFICANT CHANGE UP (ref 3.8–10.5)
WBC # FLD AUTO: 9.14 K/UL — SIGNIFICANT CHANGE UP (ref 3.8–10.5)
WBC UR QL: SIGNIFICANT CHANGE UP (ref 0–?)

## 2017-09-18 PROCEDURE — 74176 CT ABD & PELVIS W/O CONTRAST: CPT | Mod: 26

## 2017-09-18 PROCEDURE — 71250 CT THORAX DX C-: CPT | Mod: 26

## 2017-09-18 RX ORDER — PANTOPRAZOLE SODIUM 20 MG/1
40 TABLET, DELAYED RELEASE ORAL
Qty: 0 | Refills: 0 | Status: DISCONTINUED | OUTPATIENT
Start: 2017-09-18 | End: 2017-10-01

## 2017-09-18 RX ADMIN — Medication 37.5 MILLIGRAM(S): at 22:23

## 2017-09-18 RX ADMIN — Medication 0.25 MILLIGRAM(S): at 01:25

## 2017-09-18 RX ADMIN — PANTOPRAZOLE SODIUM 40 MILLIGRAM(S): 20 TABLET, DELAYED RELEASE ORAL at 17:09

## 2017-09-18 RX ADMIN — PANTOPRAZOLE SODIUM 10 MG/HR: 20 TABLET, DELAYED RELEASE ORAL at 04:28

## 2017-09-18 NOTE — CONSULT NOTE ADULT - ATTENDING COMMENTS
Anemia, unclear etiology  PRBCS, serial cbc  GI and heme eval   hemodynamically stable at this time, no need for MICU at this time  please reconsult as needed

## 2017-09-18 NOTE — CONSULT NOTE ADULT - SUBJECTIVE AND OBJECTIVE BOX
61 y/o Male with PMH PUD with hx of bleeding ulcers in the past, discharged recently from [place] for admission for anemia sent back to ED for generalized weakness, fatigue, exertional SOB. Patient on admission found to     CHIEF COMPLAINT:    HPI:    PAST MEDICAL & SURGICAL HISTORY:  Obesity  Back pain  HTN (hypertension)  Gastrointestinal ulcer  Anxiety  Seasonal allergies  H/O inguinal hernia repair      FAMILY HISTORY:  Family history of liver cancer (Father)  Family history of colon cancer (Mother)      SOCIAL HISTORY:  Smokin/2 ppd for many years (on and off). Occasional wine use      Allergies    No Known Allergies    Intolerances        HOME MEDICATIONS:    Review of Systems:   CONSTITUTIONAL: +Fatigue, No fever, weight changes, appetite changes  EYES: No eye pain, visual disturbances, or discharge  ENMT:  No difficulty hearing, tinnitus, vertigo; No sinus or throat pain  NECK: No pain or stiffness  RESPIRATORY: No cough, wheezing, chills or hemoptysis; +SOB  CARDIOVASCULAR: No chest pain, palpitations, dizziness, or leg swelling  GASTROINTESTINAL: No abdominal or epigastric pain. No nausea, vomiting, or hematemesis; No diarrhea or constipation. No melena or hematochezia.  GENITOURINARY: No dysuria, frequency, hematuria, or incontinence  NEUROLOGICAL: No headaches, memory loss, loss of strength, numbness, or tremors  SKIN: No itching, burning, rashes, or lesions   ENDOCRINE: No heat or cold intolerance; No hair loss  MUSCULOSKELETAL: No joint pain or swelling; No muscle, back, or extremity pain  HEME/LYMPH: No easy bruising, or bleeding gums    OBJECTIVE:  ICU Vital Signs Last 24 Hrs  T(C): 36.7 (18 Sep 2017 05:43), Max: 37.1 (17 Sep 2017 21:55)  T(F): 98 (18 Sep 2017 05:43), Max: 98.8 (18 Sep 2017 04:15)  HR: 71 (18 Sep 2017 05:43) (66 - 77)  BP: 126/62 (18 Sep 2017 05:43) (98/60 - 147/67)  BP(mean): --  ABP: --  ABP(mean): --  RR: 18 (18 Sep 2017 05:43) (17 - 18)  SpO2: 97% (18 Sep 2017 05:43) (93% - 100%)        CAPILLARY BLOOD GLUCOSE        PHYSICAL EXAM:  GENERAL: NAD, well-developed  HEAD:  Atraumatic, Normocephalic  EYES: EOMI,  conjunctiva and sclera clear  NECK: Supple  CHEST/LUNG: Clear to auscultation bilaterally; No wheeze  HEART: Regular rate and rhythm; No murmurs, rubs, or gallops  ABDOMEN: Soft, Nontender, Nondistended; Bowel sounds present  EXTREMITIES:  No clubbing, cyanosis, or edema  PSYCH: AAOx3  NEUROLOGY: non-focal  SKIN: No rashes or lesions      LABS:                        6.6    8.13  )-----------( 265      ( 18 Sep 2017 05:30 )             20.8     -18    140  |  106  |  24<H>  ----------------------------<  88  4.5   |  24  |  0.78    Ca    7.8<L>      18 Sep 2017 05:30  Phos  3.4       Mg     1.8         TPro  4.4<L>  /  Alb  3.1<L>  /  TBili  0.8  /  DBili  x   /  AST  12  /  ALT  13  /  AlkPhos  35<L>  18    PT/INR - ( 18 Sep 2017 05:30 )   PT: 11.5 SEC;   INR: 1.03          PTT - ( 18 Sep 2017 05:30 )  PTT:27.1 SEC  Urinalysis Basic - ( 18 Sep 2017 04:26 )    Color: PLYEL / Appearance: CLEAR / S.011 / pH: 6.5  Gluc: NEGATIVE / Ketone: NEGATIVE  / Bili: NEGATIVE / Urobili: NORMAL E.U.   Blood: NEGATIVE / Protein: NEGATIVE / Nitrite: NEGATIVE   Leuk Esterase: NEGATIVE / RBC: 0-2 / WBC 2-5   Sq Epi: x / Non Sq Epi: x / Bacteria: x        Venous Blood Gas:   @ 18:31  7.37/45/35/25/58.9  VBG Lactate: 1.0      MICROBIOLOGY:     RADIOLOGY:  [ ] Reviewed and interpreted by me    EKG: CC: Fatigue    61 y/o Male with PMH PUD with hx of bleeding ulcers in the past, discharged recently from [place] for admission for anemia sent back to ED for generalized weakness, fatigue, exertional SOB. Patient on admission found to be anemia, s/p 3 units pRBCs. Patient reports he had been feeling a little more tired, which is why he came to the ED. Denies any trauma or falls, any melanotic stools, or bleeding from any other source. No other fevers, chills, cough, dizziness, or other complaints at this time.       PAST MEDICAL & SURGICAL HISTORY:  Obesity  Back pain  HTN (hypertension)  Gastrointestinal ulcer  Anxiety  Seasonal allergies  H/O inguinal hernia repair      FAMILY HISTORY:  Family history of liver cancer (Father)  Family history of colon cancer (Mother)      SOCIAL HISTORY:  Smokin/2 ppd for many years (on and off). Occasional wine use      Allergies    No Known Allergies    Intolerances        HOME MEDICATIONS:    Review of Systems:   CONSTITUTIONAL: +Fatigue, No fever, weight changes, appetite changes  EYES: No eye pain, visual disturbances, or discharge  ENMT:  No difficulty hearing, tinnitus, vertigo; No sinus or throat pain  NECK: No pain or stiffness  RESPIRATORY: No cough, wheezing, chills or hemoptysis; +SOB  CARDIOVASCULAR: No chest pain, palpitations, dizziness, or leg swelling  GASTROINTESTINAL: No abdominal or epigastric pain. No nausea, vomiting, or hematemesis; No diarrhea or constipation. No melena or hematochezia.  GENITOURINARY: No dysuria, frequency, hematuria, or incontinence  NEUROLOGICAL: No headaches, memory loss, numbness, or tremors  SKIN: No itching, burning, rashes, or lesions   ENDOCRINE: No heat or cold intolerance; No hair loss  MUSCULOSKELETAL: No joint pain or swelling; No muscle, back, or extremity pain    OBJECTIVE:  ICU Vital Signs Last 24 Hrs  T(C): 36.7 (18 Sep 2017 05:43), Max: 37.1 (17 Sep 2017 21:55)  T(F): 98 (18 Sep 2017 05:43), Max: 98.8 (18 Sep 2017 04:15)  HR: 71 (18 Sep 2017 05:43) (66 - 77)  BP: 126/62 (18 Sep 2017 05:43) (98/60 - 147/67)  BP(mean): --  ABP: --  ABP(mean): --  RR: 18 (18 Sep 2017 05:43) (17 - 18)  SpO2: 97% (18 Sep 2017 05:43) (93% - 100%)        CAPILLARY BLOOD GLUCOSE        PHYSICAL EXAM:  GENERAL: NAD, well-developed  HEAD:  Atraumatic, Normocephalic  EYES: EOMI,  conjunctiva and sclera clear  NECK: Supple  CHEST/LUNG: Clear to auscultation bilaterally; No wheeze  HEART: Regular rate and rhythm; No murmurs, rubs, or gallops  ABDOMEN: Soft, Nontender, Nondistended; Bowel sounds present  EXTREMITIES:  No clubbing, cyanosis, or edema  PSYCH: AAOx3  NEUROLOGY: non-focal  SKIN: No rashes or lesions      LABS:                        6.6    8.13  )-----------( 265      ( 18 Sep 2017 05:30 )             20.8     18    140  |  106  |  24<H>  ----------------------------<  88  4.5   |  24  |  0.78    Ca    7.8<L>      18 Sep 2017 05:30  Phos  3.4       Mg     1.8         TPro  4.4<L>  /  Alb  3.1<L>  /  TBili  0.8  /  DBili  x   /  AST  12  /  ALT  13  /  AlkPhos  35<L>      PT/INR - ( 18 Sep 2017 05:30 )   PT: 11.5 SEC;   INR: 1.03          PTT - ( 18 Sep 2017 05:30 )  PTT:27.1 SEC  Urinalysis Basic - ( 18 Sep 2017 04:26 )    Color: PLYEL / Appearance: CLEAR / S.011 / pH: 6.5  Gluc: NEGATIVE / Ketone: NEGATIVE  / Bili: NEGATIVE / Urobili: NORMAL E.U.   Blood: NEGATIVE / Protein: NEGATIVE / Nitrite: NEGATIVE   Leuk Esterase: NEGATIVE / RBC: 0-2 / WBC 2-5   Sq Epi: x / Non Sq Epi: x / Bacteria: x        Venous Blood Gas:   @ 18:31  7.37/45/35/25/58.9  VBG Lactate: 1.0      MICROBIOLOGY:     CXR: : Clear lungs

## 2017-09-18 NOTE — PROVIDER CONTACT NOTE (CRITICAL VALUE NOTIFICATION) - ACTION/TREATMENT ORDERED:
Will redraw CBC before further interventions as per Bill Steward MD, no further interventions at this time, will continue to monitor.
1 Unit of PRBC

## 2017-09-18 NOTE — CONSULT NOTE ADULT - ASSESSMENT
62 year old M with hx of PUD with bleeding ulcers in past, recent d/c from [place] for anemia, presents with anemia with Hg of 4.9.    #Anemia, unclear etiology  -Consider further imaging CT abd/pelvis to r/o RP bleeding  -F/u with GI and heme workup  -Transfuse for Hg goal of >7  -Patient currently HD stable and does not require ICU level of care. If clinical status changes, please reconsult ICU. 62 year old M with hx of PUD with bleeding ulcers in past, recent d/c from [place] for anemia, presents with anemia with Hg of 4.9.    #Anemia, unclear etiology  -Consider further imaging CT abd/pelvis to r/o RP bleeding  -F/u with GI and heme workup  -Transfuse for Hg goal of >7  -Patient currently HD stable and does not require ICU level of care. If clinical status changes, please reconsult ICU.     Lizzeth Jones MD  PGY3 Internal Medicine Resident  Pager: [phone] 62 year old M with hx of PUD with bleeding ulcers in past, recent d/c from [place] for anemia, presents with anemia with Hg of 4.9.    #Anemia, unclear etiology  -Consider further imaging CT abd/pelvis to r/o other GI source and RP bleeding  -F/u with GI and heme workup to r/o GI source   -Transfuse for Hg goal of >7  -Patient currently HD stable and does not require ICU level of care. If clinical status changes, please reconsult ICU.     Lizzeth Jones MD  PGY3 Internal Medicine Resident  Pager: [phone] 62 year old M with hx of PUD with bleeding ulcers in past, recent d/c from [place] for anemia, presents with anemia with Hg of 4.9.    #Anemia, unclear etiology  -Consider further imaging CT abd/pelvis to r/o other GI source and RP bleeding  -F/u with GI and heme workup for workup for anemia  -Transfuse for Hg goal of >7  -Patient currently HD stable and does not require ICU level of care. If clinical status changes, please reconsult ICU.     Lizzeth Jones MD  PGY3 Internal Medicine Resident  Pager: [phone]

## 2017-09-18 NOTE — PATIENT PROFILE ADULT. - NS TRANSFER PATIENT BELONGINGS
Cell Phone/PDA (specify)/Jewelry/Money (specify)/2 bracelets, 1 ring, 1 necklace pt is wearing all four items/Clothing

## 2017-09-18 NOTE — PROGRESS NOTE ADULT - SUBJECTIVE AND OBJECTIVE BOX
_________________________________________________________________________________________  ========>>  M E D I C A L   A T T E N D I N G    F O L L O W  U P  N O T E  <<=========  -----------------------------------------------------------------------------------------------------    - Patient seen and examined by me approximately thirty minutes ago.  - In summary, patient is a 62y year old man who originally presented with anemia  - Patient today overall doing ok, comfortable, no SOB, palpitations CP.    ==================>> MEDICATIONS <<====================    MEDICATIONS  (STANDING):  venlafaxine XR. 37.5 milliGRAM(s) Oral at bedtime  pantoprazole  Injectable 40 milliGRAM(s) IV Push two times a day    MEDICATIONS  (PRN):  ALPRAZolam 0.25 milliGRAM(s) Oral at bedtime PRN Anxiety    ==================>> REVIEW OF SYSTEM <<=================    GEN: no fever, no chills, no pain  RESP: no SOB, no cough, no sputum  CVS: no chest pain, no palpitations, no edema  GI: no abdominal pain, no nausea, no constipation, no diarrhea, no bleeding noted  : no dysuria, no frequency, no hematuria  Neuro: no headache, no dizziness  Derm : no itching, no rash    ==================>> VITAL SIGNS <<==================    T(C): 36.8 (17 @ 13:33), Max: 37.1 (17 @ 21:55)  HR: 71 (17 @ 13:33) (66 - 77)  BP: 118/65 (17 @ 13:33) (98/60 - 147/67)  RR: 18 (17 @ 13:33) (17 - 18)  SpO2: 99% (17 @ 13:33) (93% - 100%)    ==================>> PHYSICAL EXAM <<=================    GEN: A&O X 3 , NAD , comfortable  HEENT: NCAT, PERRL, MMM, hearing intact  Neck: supple , no JVD  CVS: S1S2 , regular , No M/R/G appreciated  PULM: CTA B/L,  no W/R/R appreciated  ABD.: soft. non tender, non distended,  bowel sounds present  Extrem: intact pulses , no edema   PSYCH : normal mood,  not anxious     ==================>> LAB AND IMAGING <<==================                        7.2    8.64  )-----------( 261      ( 18 Sep 2017 15:02 )             22.7     Hemoglobin:   7.2  ( @ 15:02)   Hemoglobin:   6.5  ( @ 07:55)   Hemoglobin:   6.6  ( @ 05:30)   Hemoglobin:   4.9  ( 18:31)            140  |  106  |  24<H>  ----------------------------<  88  4.5   |  24  |  0.78    Ca    7.8<L>      18 Sep 2017 05:30  Phos  3.4       Mg     1.8         TPro  4.4<L>  /  Alb  3.1<L>  /  TBili  0.8  /  DBili  x   /  AST  12  /  ALT  13  /  AlkPhos  35<L>      Creatinine:  0.78   ( @ 05:30)  Creatinine:  0.64   ( @ 18:31)    PT/INR - ( 18 Sep 2017 05:30 )   PT: 11.5 SEC;   INR: 1.03     PTT - ( 18 Sep 2017 05:30 )  PTT:27.1 SEC              CARDIAC MARKERS ( 18 Sep 2017 05:30 )  x     / x     / 48 u/L / x     / x         Urinalysis Basic - ( 18 Sep 2017 04:26 )    Color: PLYEL / Appearance: CLEAR / S.011 / pH: 6.5  Gluc: NEGATIVE / Ketone: NEGATIVE  / Bili: NEGATIVE / Urobili: NORMAL E.U.   Blood: NEGATIVE / Protein: NEGATIVE / Nitrite: NEGATIVE   Leuk Esterase: NEGATIVE / RBC: 0-2 / WBC 2-5   Sq Epi: x / Non Sq Epi: x / Bacteria: x    TSH:      1.60   (17)           Lipid profile:  (17)     Total: 116     LDL  : 80     HDL  :26     TG   :71     HgA1C: 6.2  (17)            CT pending  ___________________________________________________________________________________  ===============>>  A S S E S S M E N T   A N D   P L A N <<===============  ------------------------------------------------------------------------------------------    · Assessment		  61 y/o Male  PMH PUD with hx of bleeding ulcers in the past, d/c'd 8 days ago from [facility] after admission for anemia sent back to ED for generalized weakness, fatigue, exertional SOB. Pt was transfused 5U PRBC during recent admission. Had negative occult blood at that time and capsule endoscopy was decided on for further w/u. Turned in capsule 2 days ago but no results yet. Seen by GI and Hematology during recent admission, but refused bone marrow biopsy. H/H 17 12/7/40/8; H/H upon admission in ED 6.2/20.5. Not on blood thinners. Unclear source for blood loss/etiology of anemia, suspected to be GI in nature.   Occult stool + in the ER tonight,     Problem/Plan - 1:  ·  Problem: GI bleed.    HX of PUD, Symptomatic anemia, Possible source GI Blood loss, has had extensive w/u by GI and Hem , no clear source yet, Occult stool +,   GI Consulted (pending note)   Hem consulted, pending PRBC x already   AVOID NSAIDS,   Capsule Endoscopy Result pending (out pt)  f/u CT abdomen     Problem/Plan - 2:  ·  Problem: Anxiety  on Xanax PRN, Effexor     Problem/Plan - 3:  ·  Problem: HTN   Hold Losartan for Now, BP Stable.     Problem/Plan - 4:  ·  Problem: Back pain.  HX of Disc Herniation   on Tramadol PRN at home.     Problem/Plan - 5:  Problem: Abnormal chest xray in Smoker  pending results of CT Chest     -GI/DVT Prophylaxis: PPI, ambulate    --------------------------------------------  Case discussed with pt, RN  Education given on plan of care  ___________________________  H. DORY Santos.  Pager: [phone]

## 2017-09-18 NOTE — PROVIDER CONTACT NOTE (CRITICAL VALUE NOTIFICATION) - ASSESSMENT
Pt received 3u total or packed red blood cells, tolerated well. Pt H/H pre infusion 4.9, post infusion H/H Critical Lab value 6.6. Pt asymptomatic, pt is resting in bed, no complaints at this time, no SOB, CP, or dizziness.
Patient states he feels fatigued

## 2017-09-18 NOTE — CONSULT NOTE ADULT - SUBJECTIVE AND OBJECTIVE BOX
Chief Complaint:  Patient is a 62y old  Male who presents with a chief complaint of symptomatic Anemia, GI Bleed, (17 Sep 2017 22:23)      HPI: 62M with history as below who presents with severe anemia  he had a recent  upper gastrointestinal endoscopy last month which was negative  colonoscopy was done thereafter which showed dark stool, poor prep and was deemed incomplete  last friday he had a capsule study done as outpatient  now presents with severe anemia, required multiple units of blood  no hematochezia but has dark stool (is also on oral iron)    Allergies:  No Known Allergies      Medications:  ALPRAZolam 0.25 milliGRAM(s) Oral at bedtime PRN  venlafaxine XR. 37.5 milliGRAM(s) Oral at bedtime  pantoprazole  Injectable 40 milliGRAM(s) IV Push two times a day      PMHX/PSHX:  Obesity  Back pain  HTN (hypertension)  Gastrointestinal ulcer  Anxiety  Seasonal allergies  Depression  H/O inguinal hernia repair      Family history:  Family history of liver cancer (Father)  Family history of colon cancer (Mother)  Family history of hypertension      Social History: no etoh    ROS:     General:  No wt loss, fevers, chills, night sweats, + fatigue,   Eyes:  Good vision, no reported pain  ENT:  No sore throat, pain, runny nose, dysphagia  CV:  No pain, palpitations, hypo/hypertension  Resp:  No dyspnea, cough, tachypnea, wheezing  GI:  No pain, No nausea, No vomiting, No diarrhea, No constipation, No weight loss, No fever, No pruritis, No rectal bleeding, + dark stools, No dysphagia,  :  No pain, bleeding, incontinence, nocturia  Muscle:  No pain, weakness  Neuro:  No weakness, tingling, memory problems  Psych:  No fatigue, insomnia, mood problems, depression  Endocrine:  No polyuria, polydipsia, cold/heat intolerance  Heme:  No petechiae, ecchymosis, easy bruisability  Skin:  No rash, tattoos, scars, edema      PHYSICAL EXAM:   Vital Signs:  Vital Signs Last 24 Hrs  T(C): 36.8 (18 Sep 2017 13:33), Max: 37.1 (17 Sep 2017 21:55)  T(F): 98.3 (18 Sep 2017 13:33), Max: 98.8 (18 Sep 2017 04:15)  HR: 71 (18 Sep 2017 13:33) (66 - 77)  BP: 118/65 (18 Sep 2017 13:33) (98/60 - 147/67)  BP(mean): --  RR: 18 (18 Sep 2017 13:33) (17 - 18)  SpO2: 99% (18 Sep 2017 13:33) (93% - 100%)  Daily Height in cm: 152.67 (18 Sep 2017 00:45)    Daily     GENERAL:  Appears stated age, well-groomed, well-nourished, no distress  HEENT:  NC/AT,  conjunctivae clear and pink, no thyromegaly, nodules, adenopathy, no JVD, sclera -anicteric  CHEST:  Full & symmetric excursion, no increased effort, breath sounds clear  HEART:  Regular rhythm, S1, S2, no murmur/rub/S3/S4, no abdominal bruit, no edema  ABDOMEN:  Soft, non-tender, non-distended, normoactive bowel sounds,  no masses ,no hepato-splenomegaly, no signs of chronic liver disease  EXTEREMITIES:  no cyanosis,clubbing or edema  SKIN:  No rash/erythema/ecchymoses/petechiae/wounds/abscess/warm/dry  NEURO:  Alert, oriented, no asterixis, no tremor, no encephalopathy    LABS:                        7.2    8.64  )-----------( 261      ( 18 Sep 2017 15:02 )             22.7         140  |  106  |  24<H>  ----------------------------<  88  4.5   |  24  |  0.78    Ca    7.8<L>      18 Sep 2017 05:30  Phos  3.4       Mg     1.8         TPro  4.4<L>  /  Alb  3.1<L>  /  TBili  0.8  /  DBili  x   /  AST  12  /  ALT  13  /  AlkPhos  35<L>  18    LIVER FUNCTIONS - ( 18 Sep 2017 05:30 )  Alb: 3.1 g/dL / Pro: 4.4 g/dL / ALK PHOS: 35 u/L / ALT: 13 u/L / AST: 12 u/L / GGT: x           PT/INR - ( 18 Sep 2017 05:30 )   PT: 11.5 SEC;   INR: 1.03          PTT - ( 18 Sep 2017 05:30 )  PTT:27.1 SEC  Urinalysis Basic - ( 18 Sep 2017 04:26 )    Color: PLYEL / Appearance: CLEAR / S.011 / pH: 6.5  Gluc: NEGATIVE / Ketone: NEGATIVE  / Bili: NEGATIVE / Urobili: NORMAL E.U.   Blood: NEGATIVE / Protein: NEGATIVE / Nitrite: NEGATIVE   Leuk Esterase: NEGATIVE / RBC: 0-2 / WBC 2-5   Sq Epi: x / Non Sq Epi: x / Bacteria: x          Imaging:

## 2017-09-19 ENCOUNTER — RESULT REVIEW (OUTPATIENT)
Age: 62
End: 2017-09-19

## 2017-09-19 LAB
ALBUMIN SERPL ELPH-MCNC: 3.1 G/DL — LOW (ref 3.3–5)
ALP SERPL-CCNC: 38 U/L — LOW (ref 40–120)
ALT FLD-CCNC: 15 U/L — SIGNIFICANT CHANGE UP (ref 4–41)
AST SERPL-CCNC: 16 U/L — SIGNIFICANT CHANGE UP (ref 4–40)
BILIRUB SERPL-MCNC: 0.2 MG/DL — SIGNIFICANT CHANGE UP (ref 0.2–1.2)
BUN SERPL-MCNC: 21 MG/DL — SIGNIFICANT CHANGE UP (ref 7–23)
CALCIUM SERPL-MCNC: 7.9 MG/DL — LOW (ref 8.4–10.5)
CHLORIDE SERPL-SCNC: 106 MMOL/L — SIGNIFICANT CHANGE UP (ref 98–107)
CO2 SERPL-SCNC: 24 MMOL/L — SIGNIFICANT CHANGE UP (ref 22–31)
CREAT SERPL-MCNC: 0.87 MG/DL — SIGNIFICANT CHANGE UP (ref 0.5–1.3)
GLUCOSE SERPL-MCNC: 95 MG/DL — SIGNIFICANT CHANGE UP (ref 70–99)
HCT VFR BLD CALC: 22.7 % — LOW (ref 39–50)
HCT VFR BLD CALC: 24.3 % — LOW (ref 39–50)
HGB BLD-MCNC: 7 G/DL — CRITICAL LOW (ref 13–17)
HGB BLD-MCNC: 7.8 G/DL — LOW (ref 13–17)
IRON SATN MFR SERPL: 11 UG/DL — LOW (ref 45–165)
IRON SATN MFR SERPL: 370 UG/DL — SIGNIFICANT CHANGE UP (ref 155–535)
MCHC RBC-ENTMCNC: 25 PG — LOW (ref 27–34)
MCHC RBC-ENTMCNC: 25.8 PG — LOW (ref 27–34)
MCHC RBC-ENTMCNC: 30.8 % — LOW (ref 32–36)
MCHC RBC-ENTMCNC: 32.1 % — SIGNIFICANT CHANGE UP (ref 32–36)
MCV RBC AUTO: 80.5 FL — SIGNIFICANT CHANGE UP (ref 80–100)
MCV RBC AUTO: 81.1 FL — SIGNIFICANT CHANGE UP (ref 80–100)
NRBC # FLD: 0.02 — SIGNIFICANT CHANGE UP
NRBC # FLD: 0.03 — SIGNIFICANT CHANGE UP
PLATELET # BLD AUTO: 269 K/UL — SIGNIFICANT CHANGE UP (ref 150–400)
PLATELET # BLD AUTO: 275 K/UL — SIGNIFICANT CHANGE UP (ref 150–400)
PMV BLD: 9.4 FL — SIGNIFICANT CHANGE UP (ref 7–13)
PMV BLD: 9.9 FL — SIGNIFICANT CHANGE UP (ref 7–13)
POTASSIUM SERPL-MCNC: 4.6 MMOL/L — SIGNIFICANT CHANGE UP (ref 3.5–5.3)
POTASSIUM SERPL-SCNC: 4.6 MMOL/L — SIGNIFICANT CHANGE UP (ref 3.5–5.3)
PROT SERPL-MCNC: 5.2 G/DL — LOW (ref 6–8.3)
RBC # BLD: 2.8 M/UL — LOW (ref 4.2–5.8)
RBC # BLD: 3.02 M/UL — LOW (ref 4.2–5.8)
RBC # FLD: 17.7 % — HIGH (ref 10.3–14.5)
RBC # FLD: 17.8 % — HIGH (ref 10.3–14.5)
SODIUM SERPL-SCNC: 140 MMOL/L — SIGNIFICANT CHANGE UP (ref 135–145)
UIBC SERPL-MCNC: 359 UG/DL — SIGNIFICANT CHANGE UP (ref 110–370)
WBC # BLD: 8.36 K/UL — SIGNIFICANT CHANGE UP (ref 3.8–10.5)
WBC # BLD: 8.7 K/UL — SIGNIFICANT CHANGE UP (ref 3.8–10.5)
WBC # FLD AUTO: 8.36 K/UL — SIGNIFICANT CHANGE UP (ref 3.8–10.5)
WBC # FLD AUTO: 8.7 K/UL — SIGNIFICANT CHANGE UP (ref 3.8–10.5)

## 2017-09-19 PROCEDURE — 99251: CPT

## 2017-09-19 PROCEDURE — 88342 IMHCHEM/IMCYTCHM 1ST ANTB: CPT | Mod: 26

## 2017-09-19 PROCEDURE — 88341 IMHCHEM/IMCYTCHM EA ADD ANTB: CPT | Mod: 26

## 2017-09-19 PROCEDURE — 88305 TISSUE EXAM BY PATHOLOGIST: CPT | Mod: 26

## 2017-09-19 RX ORDER — INFLUENZA VIRUS VACCINE 15; 15; 15; 15 UG/.5ML; UG/.5ML; UG/.5ML; UG/.5ML
0.5 SUSPENSION INTRAMUSCULAR ONCE
Qty: 0 | Refills: 0 | Status: DISCONTINUED | OUTPATIENT
Start: 2017-09-19 | End: 2017-10-01

## 2017-09-19 RX ADMIN — Medication 0.25 MILLIGRAM(S): at 00:38

## 2017-09-19 RX ADMIN — Medication 37.5 MILLIGRAM(S): at 22:33

## 2017-09-19 RX ADMIN — PANTOPRAZOLE SODIUM 40 MILLIGRAM(S): 20 TABLET, DELAYED RELEASE ORAL at 06:15

## 2017-09-19 RX ADMIN — PANTOPRAZOLE SODIUM 40 MILLIGRAM(S): 20 TABLET, DELAYED RELEASE ORAL at 17:23

## 2017-09-19 NOTE — CHART NOTE - NSCHARTNOTEFT_GEN_A_CORE
House GI    Full consult to follow-up. NPO after midnight for EUS, pending schedule availability. House GI    Full consult to follow-up. NPO after midnight for EUS potentially tomorrow, pending schedule availability.

## 2017-09-19 NOTE — PROGRESS NOTE ADULT - SUBJECTIVE AND OBJECTIVE BOX
_________________________________________________________________________________________  ========>>  M E D I C A L   A T T E N D I N G    F O L L O W  U P  N O T E  <<=========  -----------------------------------------------------------------------------------------------------    - Patient seen and examined by me approximately thirty minutes ago.  - In summary, patient is a 62y year old man who originally presented with anemia  - Patient today overall doing ok, comfortable, no SOB, palpitations CP. post EGD / C-Scope  ==================>> MEDICATIONS <<====================    venlafaxine XR. 37.5 milliGRAM(s) Oral at bedtime  pantoprazole  Injectable 40 milliGRAM(s) IV Push two times a day  influenza   Vaccine 0.5 milliLiter(s) IntraMuscular once    MEDICATIONS  (PRN):  ALPRAZolam 0.25 milliGRAM(s) Oral at bedtime PRN Anxiety    ==================>> REVIEW OF SYSTEM <<=================    GEN: no fever, no chills, no pain  RESP: no SOB, no cough, no sputum  CVS: no chest pain, no palpitations, no edema  GI: no abdominal pain, no nausea, no constipation, no diarrhea, no bleeding noted  : no dysuria, no frequency, no hematuria  Neuro: no headache, no dizziness  Derm : no itching, no rash    ==================>> VITAL SIGNS <<==================    Vital Signs Last 24 Hrs  T(C): 36.9 (09-19-17 @ 10:55)  T(F): 98.5 (09-19-17 @ 10:55), Max: 99.4 (09-18-17 @ 22:15)  HR: 62 (09-19-17 @ 10:55) (61 - 70)  BP: 96/55 (09-19-17 @ 10:55)  BP(mean): --  RR: 18 (09-19-17 @ 10:55) (17 - 18)  SpO2: 97% (09-19-17 @ 10:55) (96% - 99%)      ==================>> PHYSICAL EXAM <<=================    GEN: A&O X 3 , NAD , comfortable  HEENT: NCAT, PERRL, MMM, hearing intact  Neck: supple , no JVD  CVS: S1S2 , regular , No M/R/G appreciated  PULM: CTA B/L,  no W/R/R appreciated  ABD.: soft. non tender, non distended,  bowel sounds present  Extrem: intact pulses , no edema   PSYCH : normal mood,  not anxious     ==================>> LAB AND IMAGING <<==================                           7.8    8.70  )-----------( 269      ( 19 Sep 2017 07:30 )             24.3   Hemoglobin:   7.8  (09-19 @ 07:30)   Hemoglobin:   7.0  (09-19 @ 00:30)   Hemoglobin:   7.8  (09-18 @ 18:25)   Hemoglobin:   7.2  (09-18 @ 15:02)   Hemoglobin:   6.5  (09-18 @ 07:55)        09-19    140  |  106  |  21  ----------------------------<  95  4.6   |  24  |  0.87    Ca    7.9<L>      19 Sep 2017 07:30  Phos  3.4     09-18  Mg     1.8     09-18    TPro  5.2<L>  /  Alb  3.1<L>  /  TBili  0.2  /  DBili  x   /  AST  16  /  ALT  15  /  AlkPhos  38<L>  09-19    < from: CT Abdomen and Pelvis No Cont (09.18.17 @ 16:05) >  IMPRESSION: No pneumonia. Moderate sized hiatal hernia. No   retroperitoneal hematoma. Mildly enlarged right retrocrural lymph node of   indeterminate significance.    < end of copied text >    < from: Upper Endoscopy (09.19.17 @ 11:26) >  Findings:       The examined esophagus was mildly tortuous.       An 8 cm hiatus hernia with a few Patricio ulcers was found. The hiatal        narrowing was 45 cm from the incisors. The Z-line was 38 cm from the        incisors.       A medium-sized, submucosal and ulcerated, non-circumferential mass with        no bleeding and stigmata of recent bleeding was found at the        gastroesophageal junction. This was biopsied with a cold jumbo forceps        for histology. Estimated blood loss: none.       A few dispersed erosions were found in the gastric antrum. There were no        stigmata of recent bleeding.       The examined duodenum was normal.    < end of copied text >  < from: Colonoscopy (09.02.14 @ 12:13) >  Impression:          - Preparation of the colon was poor.                       - Thrombosed external hemorrhoids found on perianal exam.                       - One 8 mm polyp in the sigmoid colon. Resected and                        retrieved.                       - Erythematous mucosa in the cecum. Biopsied.                       -Sigmoid diverticulum.  Recommendation:      - Await pathology results.                       - Proceed to upper endoscopy.                       - Recommend repeat outpatient colonoscopy for screening                        purposes given poor preparation in 2-3 months.                       - Patient to follow up in the office at [phone]                       - The findings and recommendations were discussed with                        the patient and he agrees.    < end of copied text >    ___________________________________________________________________________________  ===============>>  A S S E S S M E N T   A N D   P L A N <<===============  ------------------------------------------------------------------------------------------    · Assessment		  63 y/o Male  PMH PUD with hx of bleeding ulcers in the past, d/c'd 8 days ago from [facility] after admission for anemia sent back to ED for generalized weakness, fatigue, exertional SOB. Pt was transfused 5U PRBC during recent admission. Had negative occult blood at that time and capsule endoscopy was decided on for further w/u. Turned in capsule 2 days ago but no results yet. Seen by GI and Hematology during recent admission, but refused bone marrow biopsy. H/H 8/18/17 12/7/40/8; H/H upon admission in ED 6.2/20.5. Not on blood thinners. Unclear source for blood loss/etiology of anemia, suspected to be GI in nature.   Occult stool + in the ER tonight,     Problem/Plan - 1:  ·  Problem: GI bleed, likely due to gastric mass found on EGD  GI f/u appreciated  plan for EUS, to eval lymph nodes, status of mass / staging and eventual surgical resection  monitor H/H closely  PPI  will get Cardio eval for clearance    Problem/Plan - 2:  ·  Problem: Anxiety  on Xanax PRN, Effexor     Problem/Plan - 3:  ·  Problem: HTN   Hold Losartan for Now, BP Stable.     Problem/Plan - 4:  ·  Problem: Back pain.  HX of Disc Herniation   on Tramadol PRN at home.     Problem/Plan - 5:  Problem: Abnormal chest xray in [facility]  pending results of CT Chest     -GI/DVT Prophylaxis: PPI, ambulate    --------------------------------------------  Case discussed with pt, GI  Education given on plan of care  ___________________________  H. DORY Santos.  Pager: [phone]

## 2017-09-20 ENCOUNTER — RESULT REVIEW (OUTPATIENT)
Age: 62
End: 2017-09-20

## 2017-09-20 DIAGNOSIS — K44.9 DIAPHRAGMATIC HERNIA WITHOUT OBSTRUCTION OR GANGRENE: ICD-10-CM

## 2017-09-20 LAB
BUN SERPL-MCNC: 20 MG/DL — SIGNIFICANT CHANGE UP (ref 7–23)
CALCIUM SERPL-MCNC: 8.4 MG/DL — SIGNIFICANT CHANGE UP (ref 8.4–10.5)
CHLORIDE SERPL-SCNC: 105 MMOL/L — SIGNIFICANT CHANGE UP (ref 98–107)
CO2 SERPL-SCNC: 24 MMOL/L — SIGNIFICANT CHANGE UP (ref 22–31)
CREAT SERPL-MCNC: 0.85 MG/DL — SIGNIFICANT CHANGE UP (ref 0.5–1.3)
GLUCOSE SERPL-MCNC: 103 MG/DL — HIGH (ref 70–99)
HCT VFR BLD CALC: 24.4 % — LOW (ref 39–50)
HGB BLD-MCNC: 7.5 G/DL — LOW (ref 13–17)
MCHC RBC-ENTMCNC: 24.9 PG — LOW (ref 27–34)
MCHC RBC-ENTMCNC: 30.7 % — LOW (ref 32–36)
MCV RBC AUTO: 81.1 FL — SIGNIFICANT CHANGE UP (ref 80–100)
NRBC # FLD: 0 — SIGNIFICANT CHANGE UP
PLATELET # BLD AUTO: 282 K/UL — SIGNIFICANT CHANGE UP (ref 150–400)
PMV BLD: 9.6 FL — SIGNIFICANT CHANGE UP (ref 7–13)
POTASSIUM SERPL-MCNC: 4.6 MMOL/L — SIGNIFICANT CHANGE UP (ref 3.5–5.3)
POTASSIUM SERPL-SCNC: 4.6 MMOL/L — SIGNIFICANT CHANGE UP (ref 3.5–5.3)
RBC # BLD: 3.01 M/UL — LOW (ref 4.2–5.8)
RBC # FLD: 18.3 % — HIGH (ref 10.3–14.5)
SODIUM SERPL-SCNC: 142 MMOL/L — SIGNIFICANT CHANGE UP (ref 135–145)
WBC # BLD: 7.87 K/UL — SIGNIFICANT CHANGE UP (ref 3.8–10.5)
WBC # FLD AUTO: 7.87 K/UL — SIGNIFICANT CHANGE UP (ref 3.8–10.5)

## 2017-09-20 PROCEDURE — 88305 TISSUE EXAM BY PATHOLOGIST: CPT | Mod: 26

## 2017-09-20 PROCEDURE — 88173 CYTOPATH EVAL FNA REPORT: CPT | Mod: 26

## 2017-09-20 PROCEDURE — 43242 EGD US FINE NEEDLE BX/ASPIR: CPT | Mod: GC

## 2017-09-20 RX ORDER — ACETAMINOPHEN 500 MG
650 TABLET ORAL ONCE
Qty: 0 | Refills: 0 | Status: COMPLETED | OUTPATIENT
Start: 2017-09-20 | End: 2017-09-20

## 2017-09-20 RX ORDER — CIPROFLOXACIN LACTATE 400MG/40ML
500 VIAL (ML) INTRAVENOUS EVERY 12 HOURS
Qty: 0 | Refills: 0 | Status: COMPLETED | OUTPATIENT
Start: 2017-09-20 | End: 2017-09-23

## 2017-09-20 RX ADMIN — Medication 500 MILLIGRAM(S): at 18:35

## 2017-09-20 RX ADMIN — PANTOPRAZOLE SODIUM 40 MILLIGRAM(S): 20 TABLET, DELAYED RELEASE ORAL at 05:14

## 2017-09-20 RX ADMIN — Medication 37.5 MILLIGRAM(S): at 21:20

## 2017-09-20 RX ADMIN — Medication 0.25 MILLIGRAM(S): at 00:03

## 2017-09-20 RX ADMIN — Medication 650 MILLIGRAM(S): at 21:19

## 2017-09-20 RX ADMIN — PANTOPRAZOLE SODIUM 40 MILLIGRAM(S): 20 TABLET, DELAYED RELEASE ORAL at 18:35

## 2017-09-20 RX ADMIN — Medication 650 MILLIGRAM(S): at 21:50

## 2017-09-20 NOTE — PROGRESS NOTE ADULT - PROBLEM SELECTOR PLAN 1
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

## 2017-09-20 NOTE — PROGRESS NOTE ADULT - SUBJECTIVE AND OBJECTIVE BOX
INTERVAL HPI/OVERNIGHT EVENTS:    maintained NPO for EUS today  pt reports no abdominal pain  ovenright with +bm no gib    MEDICATIONS  (STANDING):  venlafaxine XR. 37.5 milliGRAM(s) Oral at bedtime  pantoprazole  Injectable 40 milliGRAM(s) IV Push two times a day  influenza   Vaccine 0.5 milliLiter(s) IntraMuscular once    MEDICATIONS  (PRN):  ALPRAZolam 0.25 milliGRAM(s) Oral at bedtime PRN Anxiety      Allergies    No Known Allergies    Intolerances        Review of Systems:    General:  No wt loss, fevers, chills, night sweats, fatigue   Eyes:  Good vision, no reported pain  ENT:  No sore throat, pain, runny nose, dysphagia  CV:  No pain, palpitations, hypo/hypertension  Resp:  No dyspnea, cough, tachypnea, wheezing  GI:  No pain, No nausea, No vomiting, No diarrhea, No constipation, No weight loss, No fever, No pruritis, No rectal bleeding, No melena, No dysphagia  :  No pain, bleeding, incontinence, nocturia  Muscle:  No pain, weakness  Neuro:  No weakness, tingling, memory problems  Psych:  No fatigue, insomnia, mood problems, depression  Endocrine:  No polyuria, polydypsia, cold/heat intolerance  Heme:  No petechiae, ecchymosis, easy bruisability  Skin:  No rash, tattoos, scars, edema      Vital Signs Last 24 Hrs  T(C): 36.7 (20 Sep 2017 10:00), Max: 36.7 (20 Sep 2017 10:00)  T(F): 98.1 (20 Sep 2017 10:00), Max: 98.1 (20 Sep 2017 10:00)  HR: 63 (20 Sep 2017 10:00) (62 - 71)  BP: 118/69 (20 Sep 2017 10:00) (118/69 - 140/77)  BP(mean): --  RR: 17 (20 Sep 2017 10:00) (17 - 18)  SpO2: 97% (20 Sep 2017 10:00) (97% - 100%)    PHYSICAL EXAM:    Constitutional: NAD  HEENT: EOMI, throat clear  Neck: No LAD, supple  Respiratory: CTA and P  Cardiovascular: S1 and S2, RRR, no M  Gastrointestinal: BS+, soft, NT/ND, neg HSM,  Extremities: No peripheral edema, neg clubbing, cyanosis  Vascular: 2+ peripheral pulses  Neurological: A/O x 3, no focal deficits  Psychiatric: Normal mood, normal affect  Skin: No rashes      LABS:                        7.5    7.87  )-----------( 282      ( 20 Sep 2017 07:17 )             24.4     09-20    142  |  105  |  20  ----------------------------<  103<H>  4.6   |  24  |  0.85    Ca    8.4      20 Sep 2017 07:17    TPro  5.2<L>  /  Alb  3.1<L>  /  TBili  0.2  /  DBili  x   /  AST  16  /  ALT  15  /  AlkPhos  38<L>  09-19          RADIOLOGY & ADDITIONAL TESTS:  < from: Upper Endoscopy (09.19.17 @ 11:26) >  Findings:       The examined esophagus was mildly tortuous.       An 8 cm hiatus hernia with a few Patricio ulcers was found. The hiatal        narrowing was 45 cm from the incisors. The Z-line was 38 cm from the        incisors.       A medium-sized, submucosal and ulcerated, non-circumferential mass with        no bleeding and stigmata of recent bleeding was found at the        gastroesophageal junction. This was biopsied with a cold jumbo forceps        for histology. Estimated blood loss: none.       A few dispersed erosions were found in the gastric antrum. There were no        stigmata of recent bleeding.       The examined duodenum was normal.    < end of copied text >

## 2017-09-20 NOTE — CONSULT NOTE ADULT - SUBJECTIVE AND OBJECTIVE BOX
CHIEF COMPLAINT:  anemia  GI bleed abdominal mass preop evaluation  HISTORY OF PRESENT ILLNESS:  HPI:  63 y/o Male  PMH PUD with hx of bleeding ulcers in the past, d/c'd 8 days ago from [facility] after admission for anemia sent back to ED for generalized weakness, fatigue, exertional SOB. Pt was transfused 5U PRBC during recent admission. Had negative occult blood at that time and capsule endoscopy was decided on for further w/u. Turned in capsule 2 days ago but no results yet. Seen by GI and Hematology during recent admission, but refused bone marrow biopsy. H/H 8/18/17 12/7/40/8; H/H upon admission in ED 6.2/20.5. Not on blood thinners. Unclear source for blood loss/etiology of anemia, suspected to be GI in nature. Pt denies chest pain or syncope, states has felt worsening fatigue and SOB. Called his hematologist Dmary Huang) and advised to go to ED. Last EGD July 2017, last Colonoscopy Aug. 2017, as outpatient, S/P CT A/P as well No clear source of bleeding was identified, pt denies any NSAID use for now, no blood in stool reported, no cough, no fever, no N/V, no abdominal pain, no wt Loss, no dysuria, pt's Hgb 4.9 tonight dropped  from 9.5 ( Sep. 8, 2017 ), pt is getting PRBC x 3 tonight, IV Protonix, A+O x 3, no distress, NO HA, Occult stool + in the ER tonight,   Labs: Occult Stool +, Na 138, K+ 4.3, BUN  26, Creatinine 0.64, Glucose 89, LFT Normal, WBC 9.23, Hgb 4.9, MCV 82, Platelet 276, PT 12.4, INR 1.1, PTT 26.8   Vitals: Tem 98.7, HR 71, /76, RR 18, 100 % RA (17 Sep 2017 22:23)    Abdominal mass found being evaluated possible surgery  sob only with anemia no cp  prior cath normal cors mild global LV hypokinesia    PAST MEDICAL & SURGICAL HISTORY:  Obesity  Back pain  HTN (hypertension)  Gastrointestinal ulcer  Anxiety  Seasonal allergies  H/O inguinal hernia repair          MEDICATIONS:        ALPRAZolam 0.25 milliGRAM(s) Oral at bedtime PRN  venlafaxine XR. 37.5 milliGRAM(s) Oral at bedtime    pantoprazole  Injectable 40 milliGRAM(s) IV Push two times a day      influenza   Vaccine 0.5 milliLiter(s) IntraMuscular once      FAMILY HISTORY:  Family history of liver cancer (Father)  Family history of colon cancer (Mother)      Allergies    No Known Allergies    Intolerances    	    REVIEW OF SYSTEMS:  CONSTITUTIONAL: No fever, weight loss, or fatigue  RESPIRATORY: No cough, wheezing, chills or hemoptysis; No Shortness of Breath  CARDIOVASCULAR: No chest pain, palpitations, passing out, dizziness, or leg swelling  GASTROINTESTINAL: recurrent anemia GI bleeding see HPI    PHYSICAL EXAM:  T(C): 36.5 (09-20-17 @ 04:30), Max: 36.9 (09-19-17 @ 09:00)  HR: 67 (09-20-17 @ 04:30) (62 - 71)  BP: 127/73 (09-20-17 @ 04:30) (96/55 - 140/77)  RR: 17 (09-20-17 @ 04:30) (17 - 18)  SpO2: 97% (09-20-17 @ 04:30) (97% - 100%)  Wt(kg): --  I&O's Summary      Appearance: Normal, comfortable	  HEENT:   Normal oral mucosa, PERRL, EOMI	  Cardiovascular: Normal S1 S2, No JVD, No murmurs  Respiratory: Lungs clear to auscultation	  Psychiatry: A & O x 3, Mood & affect appropriate  Gastrointestinal:  Soft, Non-tender, + BS	  Skin: No rashes, No ecchymoses, No cyanosis	  Neurologic: Non-focal  Extremities: No clubbing, cyanosis or edema  Vascular: Peripheral pulses palpable 2+ bilaterally    TELEMETRY: 	    ECG:  NSR WNL	  endoscopy mass at GE junction biopsied  	  LABS:	 	    CARDIAC MARKERS:                                  7.5    7.87  )-----------( 282      ( 20 Sep 2017 07:17 )             24.4     09-19    140  |  106  |  21  ----------------------------<  95  4.6   |  24  |  0.87    Ca    7.9<L>      19 Sep 2017 07:30    TPro  5.2<L>  /  Alb  3.1<L>  /  TBili  0.2  /  DBili  x   /  AST  16  /  ALT  15  /  AlkPhos  38<L>  09-19    proBNP:   Lipid Profile:   HgA1c:   TSH:

## 2017-09-20 NOTE — CONSULT NOTE ADULT - SUBJECTIVE AND OBJECTIVE BOX
61 y/o Male with PMH PUD with hx of bleeding ulcers in the past, discharged recently from [facility] for admission for anemia sent back to ED for generalized weakness, fatigue, exertional SOB. Patient on admission found to be anemia, s/p 3 units pRBCs. Patient reports he had been feeling a little more tired, which is why he came to the ED. Denies any trauma or falls, any melanotic stools, or bleeding from any other source. No other fevers, chills, cough, dizziness, or other complaints at this time. He had a recent  upper gastrointestinal endoscopy last month which was negative, colonoscopy was done thereafter which showed dark stool, poor prep and was deemed incomplete.  Llast friday he had a capsule study done as outpatient, no hematochezia but has dark stool (is also on oral iron).  EGD done on 9/19/17 showing hiatal hernia and mass at GE junction.  Patient scheduled for EUS on 9/20/17.    PAST MEDICAL & SURGICAL HISTORY:  Obesity  Back pain  HTN (hypertension)  Gastrointestinal ulcer  Anxiety  Seasonal allergies  H/O inguinal hernia repair    MEDICATIONS  (STANDING):  venlafaxine XR. 37.5 milliGRAM(s) Oral at bedtime  pantoprazole  Injectable 40 milliGRAM(s) IV Push two times a day  influenza   Vaccine 0.5 milliLiter(s) IntraMuscular once        Review of System:    General:  No wt loss, fevers, chills, night sweats, + fatigue,   Eyes:  Good vision, no reported pain  ENT:  No sore throat, pain, runny nose, dysphagia  CV:  No pain, palpitations, hypo/hypertension  Resp:  No dyspnea, cough, tachypnea, wheezing  GI:  No pain, No nausea, No vomiting, No diarrhea, No constipation, No weight loss, No fever, No pruritis, No rectal bleeding, + dark stools, No dysphagia,  :  No pain, bleeding, incontinence, nocturia  Muscle:  No pain, weakness  Neuro:  No weakness, tingling, memory problems  Psych:  No fatigue, insomnia, mood problems, depression  Endocrine:  No polyuria, polydipsia, cold/heat intolerance  Heme:  No petechiae, ecchymosis, easy bruisability  Skin:  No rash, tattoos, scars, edema    Physical Exam:  Vital Signs Last 24 Hrs  T(C): 36.4 (20 Sep 2017 00:08), Max: 37 (19 Sep 2017 01:33)  T(F): 97.6 (20 Sep 2017 00:08), Max: 98.6 (19 Sep 2017 01:33)  HR: 70 (20 Sep 2017 00:08) (61 - 71)  BP: 140/77 (20 Sep 2017 00:08) (96/55 - 140/77)  BP(mean): --  RR: 17 (20 Sep 2017 00:08) (17 - 18)  SpO2: 98% (20 Sep 2017 00:08) (96% - 100%)    GENERAL: NAD, well-developed  HEAD:  Atraumatic, Normocephalic  EYES: EOMI,  conjunctiva and sclera clear  NECK: Supple  CHEST/LUNG: Clear to auscultation bilaterally   HEART: Regular rate and rhythm; No murmurs, rubs, or gallops  ABDOMEN: Soft, Nontender, Nondistended; Bowel sounds present, no guarding   EXTREMITIES:  No clubbing, cyanosis, or edema  PSYCH: AAOx3  NEUROLOGY: non-focal  SKIN: No rashes or lesions 61 y/o Male with PMH PUD with hx of bleeding ulcers in the past, discharged recently from [facility] for admission for anemia sent back to ED for generalized weakness, fatigue, exertional SOB. Patient on admission found to be anemia, s/p 3 units pRBCs. Patient reports he had been feeling a little more tired, which is why he came to the ED. Denies any trauma or falls, any melanotic stools, or bleeding from any other source. No other fevers, chills, cough, dizziness, or other complaints at this time. He had a recent  upper gastrointestinal endoscopy last month which was negative, colonoscopy was done thereafter which showed dark stool, poor prep and was deemed incomplete.  Llast friday he had a capsule study done as outpatient, no hematochezia but has dark stool (is also on oral iron).  EGD done on 17 showing 8cm hiatal hernia and medium sized submucosal and ulcerated non-circumferential  mass at GE junction.  Patient scheduled for EUS on 17.    PAST MEDICAL & SURGICAL HISTORY:  Obesity  Back pain  HTN (hypertension)  Gastrointestinal ulcer  Anxiety  Seasonal allergies  H/O inguinal hernia repair    MEDICATIONS  (STANDING):  venlafaxine XR. 37.5 milliGRAM(s) Oral at bedtime  pantoprazole  Injectable 40 milliGRAM(s) IV Push two times a day  influenza   Vaccine 0.5 milliLiter(s) IntraMuscular once        Review of System:    General:  No wt loss, fevers, chills, night sweats, + fatigue,   Eyes:  Good vision, no reported pain  ENT:  No sore throat, pain, runny nose, dysphagia  CV:  No pain, palpitations, hypo/hypertension  Resp:  No dyspnea, cough, tachypnea, wheezing  GI:  No pain, No nausea, No vomiting, No diarrhea, No constipation, No weight loss, No fever, No pruritis, No rectal bleeding, + dark stools, No dysphagia,  :  No pain, bleeding, incontinence, nocturia  Muscle:  No pain, weakness  Neuro:  No weakness, tingling, memory problems  Psych:  No fatigue, insomnia, mood problems, depression  Endocrine:  No polyuria, polydipsia, cold/heat intolerance  Heme:  No petechiae, ecchymosis, easy bruisability  Skin:  No rash, tattoos, scars, edema    Physical Exam:  Vital Signs Last 24 Hrs  T(C): 36.4 (20 Sep 2017 00:08), Max: 37 (19 Sep 2017 01:33)  T(F): 97.6 (20 Sep 2017 00:08), Max: 98.6 (19 Sep 2017 01:33)  HR: 70 (20 Sep 2017 00:08) (61 - 71)  BP: 140/77 (20 Sep 2017 00:08) (96/55 - 140/77)  BP(mean): --  RR: 17 (20 Sep 2017 00:08) (17 - 18)  SpO2: 98% (20 Sep 2017 00:08) (96% - 100%)    GENERAL: NAD, well-developed  HEAD:  Atraumatic, Normocephalic  EYES: EOMI,  conjunctiva and sclera clear  NECK: Supple  CHEST/LUNG: Clear to auscultation bilaterally   HEART: Regular rate and rhythm; No murmurs, rubs, or gallops  ABDOMEN: Soft, Nontender, Nondistended; Bowel sounds present, no guarding   EXTREMITIES:  No clubbing, cyanosis, or edema  PSYCH: AAOx3  NEUROLOGY: non-focal  SKIN: No rashes or lesions    ( @ 07:30)                      7.8  8.70 )-----------( 269                 24.3    Neutrophils = -- (--%)  Lymphocytes = -- (--%)  Eosinophils = -- (--%)  Basophils = -- (--%)  Monocytes = -- (--%)  Bands = --%        140  |  106  |  21  ----------------------------<  95  4.6   |  24  |  0.87    Ca    7.9<L>      19 Sep 2017 07:30  Phos  3.4       Mg     1.8         TPro  5.2<L>  /  Alb  3.1<L>  /  TBili  0.2  /  DBili  x   /  AST  16  /  ALT  15  /  AlkPhos  38<L>      ( 18 Sep 2017 05:30 )   PT: 11.5 SEC;   INR: 1.03 ;       PTT:27.1 SEC  CARDIAC MARKERS ( 18 Sep 2017 05:30 )  Trop x     / CK 48 u/L / CKMB x           RVP:          Tox:         Urinalysis Basic - ( 18 Sep 2017 04:26 )    Color: PLYEL / Appearance: CLEAR / S.011 / pH: 6.5  Gluc: NEGATIVE / Ketone: NEGATIVE  / Bili: NEGATIVE / Urobili: NORMAL E.U.   Blood: NEGATIVE / Protein: NEGATIVE / Nitrite: NEGATIVE   Leuk Esterase: NEGATIVE / RBC: 0-2 / WBC 2-5   Sq Epi: x / Non Sq Epi: x / Bacteria: x

## 2017-09-20 NOTE — CONSULT NOTE ADULT - ASSESSMENT
Impression:  1. Submucosal GE junction lesion - differential includes GIST, leiomyoma, neuroendocrine tumor/carcinoid  2. Blood loss anemia - differential includes bleeding from submucosal ulcerated lesion, colonic angioectasias/neoplasms, small bowel angioectasias    Recommend:  -Monitor blood counts  -NPO for EUS with FNA today of the lesion  -Cardiology and thoracic consults appreciated

## 2017-09-20 NOTE — CONSULT NOTE ADULT - SUBJECTIVE AND OBJECTIVE BOX
HOUSE GI ADVANCED ENDOSCOPY CONSULT      Chief Complaint:  Patient is a 62y old  Male who presents with a chief complaint of symptomatic Anemia, GI Bleed, (17 Sep 2017 22:23)      HPI: 62 male with HTN, obesity, presents with anemia. Patient was sent from the outside by his physicians for anemia down to 4. He recently had workup for anemia which included an upper EGD last month that was negative and also colonoscopy that was incomplete given poor prep. These were done by Dr. Brunner, and he was supposed to get a capsule endoscopy which he went to Dr. Hendrix for. The capsule was done on Friday, but he had repeat bloodwork showing worsening anemia and he was sent to the hospital. Yesterday he had a repeat EGD which showed a submucosal ulcerated lesion in the GE junction, thus [facility] is being consulted for an EUS of the lesion.    Regarding his bleeding, he saw dark stools recently but attributed it to iron. He denies any amelia hematochezia. He denies any abdominal pain, dysphagia, early satiety, abnormal unintended weight loss. His anemia started about 3 years ago when was also found to be anemic on outpatient labs, and was sent to [facility] - he had a negative EGD then and an incomplete colonoscopy with poor preparation. His Hgb was stable between then and last month. He denies any chronic NSAID use. There is a family history of cancers, including colon cancer in his mother.    Allergies:  No Known Allergies    Hospital Medications:  ALPRAZolam 0.25 milliGRAM(s) Oral at bedtime PRN  venlafaxine XR. 37.5 milliGRAM(s) Oral at bedtime  pantoprazole  Injectable 40 milliGRAM(s) IV Push two times a day  influenza   Vaccine 0.5 milliLiter(s) IntraMuscular once      PMHX/PSHX:  Obesity  Back pain  HTN (hypertension)  Gastrointestinal ulcer  Anxiety  Seasonal allergies  Depression    H/O inguinal hernia repair      Family history:  Family history of liver cancer (Father)  Family history of colon cancer (Mother)  Family history of hypertension      Social History: Some cigarette smoking and ETOH; no illicit drug use    ROS:     General:  No wt loss, fevers, chills, night sweats; Reports weakness and fatigue  Eyes:  Good vision, no reported pain  ENT:  No sore throat, pain, runny nose, dysphagia  CV:  No pain, palpitations, hypo/hypertension  Resp:  No dyspnea, cough, tachypnea, wheezing  GI:  See HPI  :  No pain, bleeding, incontinence, nocturia  Muscle:  No pain, weakness  Neuro:  No weakness, tingling, memory problems  Psych:  No fatigue, insomnia, mood problems, depression  Endocrine:  No polyuria, polydipsia, cold/heat intolerance  Heme:  No petechiae, ecchymosis, easy bruisability  Skin:  No rash, edema      PHYSICAL EXAM:     GENERAL:  Appears stated age, well-groomed, well-nourished, no distress  HEENT:  NC/AT,  conjunctivae clear and pink,  no JVD,   CHEST:  Full & symmetric excursion, no increased effort, breath sounds clear  HEART:  Regular rhythm, S1, S2, no murmur/rub  ABDOMEN:  Soft, non-tender, non-distended, normoactive bowel sounds,  no masses ,  EXTREMITIES:  no cyanosis,clubbing or edema  SKIN:  No rash/erythema  NEURO:  Alert, oriented,     Vital Signs:  Vital Signs Last 24 Hrs  T(C): 36.5 (20 Sep 2017 04:30), Max: 36.9 (19 Sep 2017 09:00)  T(F): 97.7 (20 Sep 2017 04:30), Max: 98.5 (19 Sep 2017 09:00)  HR: 67 (20 Sep 2017 04:30) (62 - 71)  BP: 127/73 (20 Sep 2017 04:30) (96/55 - 140/77)  BP(mean): --  RR: 17 (20 Sep 2017 04:30) (17 - 18)  SpO2: 97% (20 Sep 2017 04:30) (97% - 100%)  Daily     Daily     LABS:                        7.5    7.87  )-----------( 282      ( 20 Sep 2017 07:17 )             24.4     Hemoglobin: 7.5 g/dL (09-20-17 @ 07:17)  Hemoglobin: 7.8 g/dL (09-19-17 @ 07:30)  Hemoglobin: 7.0 g/dL (09-19-17 @ 00:30)  Hemoglobin: 7.8 g/dL (09-18-17 @ 18:25)  Hemoglobin: 7.2 g/dL (09-18-17 @ 15:02)      09-20    142  |  105  |  20  ----------------------------<  103<H>  4.6   |  24  |  0.85    Ca    8.4      20 Sep 2017 07:17    TPro  5.2<L>  /  Alb  3.1<L>  /  TBili  0.2  /  DBili  x   /  AST  16  /  ALT  15  /  AlkPhos  38<L>  09-19    LIVER FUNCTIONS - ( 19 Sep 2017 07:30 )  Alb: 3.1 g/dL / Pro: 5.2 g/dL / ALK PHOS: 38 u/L / ALT: 15 u/L / AST: 16 u/L / GGT: x             Imaging:    < from: Upper Endoscopy (09.19.17 @ 11:26) >    [facility]  _______________________________________________________________________________  Patient Name: Kirt Silverio         Procedure Date: 9/19/2017 11:26 AM  MRN: [id number]                     Account Number: [id number]  YOB: 1955               Admit Type: Inpatient  Room: [place] [address]                         Gender: Male  Attending MD: KALA HERNANDEZ DO        _______________________________________________________________________________     Procedure:           Upper GI endoscopy  Indications:         Melena  Providers:           KALA HERNANDEZ DO  Medicines:           Monitored Anesthesia Care  Complications:       No immediate complications.  Procedure:           After obtaining informed consent, the endoscope was                        passed under direct vision. Throughout the procedure,                        the patient's blood pressure, pulse, and oxygen                        saturations were monitored continuously. The Endoscope                      was introduced through the mouth, and advanced to the                        second part of duodenum. The upper GI endoscopy was                        accomplished without difficulty. The patient tolerated                        the procedure well.                                                                                   Findings:       The examined esophagus was mildly tortuous.       An 8 cm hiatus hernia with a few Patricio ulcers was found. The hiatal        narrowing was 45 cm from the incisors. The Z-line was 38 cm from the        incisors.       A medium-sized, submucosal and ulcerated, non-circumferential mass with        no bleeding and stigmata of recent bleeding was found at the        gastroesophageal junction. This was biopsied with a cold jumbo forceps        for histology. Estimated blood loss: none.       A few dispersed erosions were found in the gastric antrum. There were no        stigmata of recent bleeding.       The examined duodenum was normal.                                                                                   Impression:          - No specimens collected.  Recommendation:      - [facility].                       - Perform an upper endoscopic ultrasound (UEUS) at                        appointment to be scheduled.                       - Await pathology results.                                                                                   Attending Participation:       I personally performed the entire procedure.                                                                                     _________________  KALA HERNANDEZ DO  9/19/2017 1:55:34 PM  This report has been signed electronically.  Number of Addenda: 0    Note Initiated On: 9/19/2017 11:26 AM    < end of copied text >

## 2017-09-20 NOTE — PROGRESS NOTE ADULT - SUBJECTIVE AND OBJECTIVE BOX
_________________________________________________________________________________________  ========>>  M E D I C A L   A T T E N D I N G    F O L L O W  U P  N O T E  <<=========  -----------------------------------------------------------------------------------------------------    - Patient seen and examined by me approximately thirty minutes ago.  - In summary, patient is a 62y year old man who originally presented with anemia  - Patient today overall doing ok, comfortable, no SOB, palpitations CP. no bleeding reported  ==================>> MEDICATIONS <<====================    venlafaxine XR. 37.5 milliGRAM(s) Oral at bedtime  pantoprazole  Injectable 40 milliGRAM(s) IV Push two times a day  influenza   Vaccine 0.5 milliLiter(s) IntraMuscular once    MEDICATIONS  (PRN):  ALPRAZolam 0.25 milliGRAM(s) Oral at bedtime PRN Anxiety    ==================>> REVIEW OF SYSTEM <<=================    GEN: no fever, no chills, no pain  RESP: no SOB, no cough, no sputum  CVS: no chest pain, no palpitations, no edema  GI: no abdominal pain, no nausea, no constipation, no diarrhea, no bleeding noted  : no dysuria, no frequency, no hematuria  Neuro: no headache, no dizziness  Derm : no itching, no rash    ==================>> VITAL SIGNS <<==================    Vital Signs Last 24 Hrs  T(C): 36.7 (09-20-17 @ 10:00)  T(F): 98.1 (09-20-17 @ 10:00), Max: 98.1 (09-20-17 @ 10:00)  HR: 63 (09-20-17 @ 10:00) (62 - 71)  BP: 118/69 (09-20-17 @ 10:00)  BP(mean): --  RR: 17 (09-20-17 @ 10:00) (17 - 18)  SpO2: 97% (09-20-17 @ 10:00) (97% - 100%)      ==================>> PHYSICAL EXAM <<=================    GEN: A&O X 3 , NAD , comfortable  HEENT: NCAT, PERRL, MMM, hearing intact  Neck: supple , no JVD  CVS: S1S2 , regular , No M/R/G appreciated  PULM: CTA B/L,  no W/R/R appreciated  ABD.: soft. non tender, non distended,  bowel sounds present  Extrem: intact pulses , no edema   PSYCH : normal mood,  not anxious     ==================>> LAB AND IMAGING <<==================                          7.5    7.87  )-----------( 282      ( 20 Sep 2017 07:17 )             24.4   Hemoglobin:   7.5  (09-20 @ 07:17)   Hemoglobin:   7.8  (09-19 @ 07:30)   Hemoglobin:   7.0  (09-19 @ 00:30)   Hemoglobin:   7.8  (09-18 @ 18:25)   Hemoglobin:   7.2  (09-18 @ 15:02)        09-20    142  |  105  |  20  ----------------------------<  103<H>  4.6   |  24  |  0.85    Ca    8.4      20 Sep 2017 07:17    TPro  5.2<L>  /  Alb  3.1<L>  /  TBili  0.2  /  DBili  x   /  AST  16  /  ALT  15  /  AlkPhos  38<L>  09-19    < from: CT Abdomen and Pelvis No Cont (09.18.17 @ 16:05) >  IMPRESSION: No pneumonia. Moderate sized hiatal hernia. No   retroperitoneal hematoma. Mildly enlarged right retrocrural lymph node of   indeterminate significance.  < end of copied text >    < from: Upper Endoscopy (09.19.17 @ 11:26) >  Findings:       The examined esophagus was mildly tortuous.       An 8 cm hiatus hernia with a few Patricio ulcers was found. The hiatal        narrowing was 45 cm from the incisors. The Z-line was 38 cm from the        incisors.       A medium-sized, submucosal and ulcerated, non-circumferential mass with        no bleeding and stigmata of recent bleeding was found at the        gastroesophageal junction. This was biopsied with a cold jumbo forceps        for histology. Estimated blood loss: none.       A few dispersed erosions were found in the gastric antrum. There were no        stigmata of recent bleeding.       The examined duodenum was normal.  < end of copied text >    < from: Colonoscopy (09.02.14 @ 12:13) >  Impression:          - Preparation of the colon was poor.                       - Thrombosed external hemorrhoids found on perianal exam.                       - One 8 mm polyp in the sigmoid colon. Resected and                        retrieved.                       - Erythematous mucosa in the cecum. Biopsied.                       -Sigmoid diverticulum.  Recommendation:      - Await pathology results.                       - Proceed to upper endoscopy.                       - Recommend repeat outpatient colonoscopy for screening                        purposes given poor preparation in 2-3 months.                       - Patient to follow up in the office at [phone]                       - The findings and recommendations were discussed with                        the patient and he agrees.  < end of copied text >    ___________________________________________________________________________________  ===============>>  A S S E S S M E N T   A N D   P L A N <<===============  ------------------------------------------------------------------------------------------    · Assessment		  61 y/o Male  PMH PUD with hx of bleeding ulcers in the past, d/c'd 8 days ago from [place] after admission for anemia sent back to ED for generalized weakness, fatigue, exertional SOB. Pt was transfused 5U PRBC during recent admission. Had negative occult blood at that time and capsule endoscopy was decided on for further w/u. Turned in capsule 2 days ago but no results yet. Seen by GI and Hematology during recent admission, but refused bone marrow biopsy. H/H 8/18/17 12/7/40/8; H/H upon admission in ED 6.2/20.5. Not on blood thinners. Unclear source for blood loss/etiology of anemia, suspected to be GI in nature.   Occult stool + in the ER tonight,     Problem/Plan - 1:  ·  Problem: GI bleed, likely due to gastric mass found on EGD  GI f/u appreciated  plan for EUS, to eval lymph nodes, status of mass / staging and eventual surgical resection  monitor H/H closely: transfuse as needed  PPI  Cardio eval for clearance appreciated: check Echo    Problem/Plan - 2:  ·  Problem: Anxiety  on Xanax PRN, Effexor     Problem/Plan - 3:  ·  Problem: HTN   Hold Losartan for Now, BP Stable.     Problem/Plan - 4:  ·  Problem: Back pain.  HX of Disc Herniation   on Tramadol PRN at home.     Problem/Plan - 5:  Problem: Abnormal chest xray in Smoker  no nodule on CT Chest   smoking cessation educaiton    -GI/DVT Prophylaxis: PPI, ambulate    --------------------------------------------  Case discussed with pt, GI  Education given on plan of care  ___________________________  H. DORY Santos.  Pager: [phone]

## 2017-09-20 NOTE — CONSULT NOTE ADULT - ASSESSMENT
1. anemia GI bleeding abdominal mass ?GE junction  2. no angina or chf  3.SOB only with anemia no cp  4. normal cors on previous cath with mild cardiomyopathy    Recommend  2 D echo as baseline  no cardiac contraindication to abdominal surgery if needed hemodynamically stable

## 2017-09-20 NOTE — CONSULT NOTE ADULT - PROBLEM SELECTOR RECOMMENDATION 9
will obtain capsule study results, as on monday no report available  advance diet  if capsule negative will repeat colonoscopy prior to discharge  cbc daily
Npo for EUS  Continue care as per medicine and GI  Dr. Santo to see patient

## 2017-09-21 LAB
BUN SERPL-MCNC: 20 MG/DL — SIGNIFICANT CHANGE UP (ref 7–23)
CALCIUM SERPL-MCNC: 8.1 MG/DL — LOW (ref 8.4–10.5)
CHLORIDE SERPL-SCNC: 106 MMOL/L — SIGNIFICANT CHANGE UP (ref 98–107)
CO2 SERPL-SCNC: 23 MMOL/L — SIGNIFICANT CHANGE UP (ref 22–31)
CREAT SERPL-MCNC: 0.78 MG/DL — SIGNIFICANT CHANGE UP (ref 0.5–1.3)
GLUCOSE SERPL-MCNC: 135 MG/DL — HIGH (ref 70–99)
HCT VFR BLD CALC: 28.3 % — LOW (ref 39–50)
HGB BLD-MCNC: 8.5 G/DL — LOW (ref 13–17)
MCHC RBC-ENTMCNC: 25 PG — LOW (ref 27–34)
MCHC RBC-ENTMCNC: 30 % — LOW (ref 32–36)
MCV RBC AUTO: 83.2 FL — SIGNIFICANT CHANGE UP (ref 80–100)
NRBC # FLD: 0.02 — SIGNIFICANT CHANGE UP
PLATELET # BLD AUTO: 279 K/UL — SIGNIFICANT CHANGE UP (ref 150–400)
PMV BLD: 9.6 FL — SIGNIFICANT CHANGE UP (ref 7–13)
POTASSIUM SERPL-MCNC: 3.9 MMOL/L — SIGNIFICANT CHANGE UP (ref 3.5–5.3)
POTASSIUM SERPL-SCNC: 3.9 MMOL/L — SIGNIFICANT CHANGE UP (ref 3.5–5.3)
RBC # BLD: 3.4 M/UL — LOW (ref 4.2–5.8)
RBC # FLD: 18.7 % — HIGH (ref 10.3–14.5)
SODIUM SERPL-SCNC: 142 MMOL/L — SIGNIFICANT CHANGE UP (ref 135–145)
WBC # BLD: 5.73 K/UL — SIGNIFICANT CHANGE UP (ref 3.8–10.5)
WBC # FLD AUTO: 5.73 K/UL — SIGNIFICANT CHANGE UP (ref 3.8–10.5)

## 2017-09-21 RX ORDER — ACETAMINOPHEN 500 MG
650 TABLET ORAL EVERY 6 HOURS
Qty: 0 | Refills: 0 | Status: DISCONTINUED | OUTPATIENT
Start: 2017-09-21 | End: 2017-10-01

## 2017-09-21 RX ADMIN — Medication 0.25 MILLIGRAM(S): at 01:27

## 2017-09-21 RX ADMIN — Medication 500 MILLIGRAM(S): at 16:41

## 2017-09-21 RX ADMIN — Medication 500 MILLIGRAM(S): at 05:36

## 2017-09-21 RX ADMIN — Medication 650 MILLIGRAM(S): at 16:41

## 2017-09-21 RX ADMIN — Medication 650 MILLIGRAM(S): at 17:40

## 2017-09-21 RX ADMIN — Medication 37.5 MILLIGRAM(S): at 21:02

## 2017-09-21 RX ADMIN — PANTOPRAZOLE SODIUM 40 MILLIGRAM(S): 20 TABLET, DELAYED RELEASE ORAL at 05:35

## 2017-09-21 RX ADMIN — PANTOPRAZOLE SODIUM 40 MILLIGRAM(S): 20 TABLET, DELAYED RELEASE ORAL at 16:44

## 2017-09-21 NOTE — PROGRESS NOTE ADULT - PROBLEM SELECTOR PLAN 1
- s/p EGD 9/19 with a Submucosal GE junction lesion  - ddx includes GIST, leiomyoma, neuroendocrine tumor/carcinoid  - cont ppi bid  - EGD pathology testing  - s/p EUS/FNA 9/20 with pathology testing  - thoracic eval   - monitor stools for further melena or gib  - regular diet - s/p EGD 9/19 with a Submucosal GE junction lesion  - ddx includes GIST, leiomyoma, neuroendocrine tumor/carcinoid  - cont ppi bid  - EGD pathology testing  - s/p EUS/FNA 9/20 with pathology testing  - further recs from thoracic surgery pending pathology results  - monitor stools for further melena or gib  - regular diet

## 2017-09-21 NOTE — PROGRESS NOTE ADULT - SUBJECTIVE AND OBJECTIVE BOX
INTERVAL HPI/OVERNIGHT EVENTS:    pt reports no bm this morning yet  no abdominal complaints  tolerating his diet "fine"    MEDICATIONS  (STANDING):  venlafaxine XR. 37.5 milliGRAM(s) Oral at bedtime  pantoprazole  Injectable 40 milliGRAM(s) IV Push two times a day  influenza   Vaccine 0.5 milliLiter(s) IntraMuscular once  ciprofloxacin     Tablet 500 milliGRAM(s) Oral every 12 hours    MEDICATIONS  (PRN):  ALPRAZolam 0.25 milliGRAM(s) Oral at bedtime PRN Anxiety      Allergies    No Known Allergies    Intolerances        Review of Systems:    General:  No wt loss, fevers, chills, night sweats, fatigue   Eyes:  Good vision, no reported pain  ENT:  No sore throat, pain, runny nose, dysphagia  CV:  No pain, palpitations, hypo/hypertension  Resp:  No dyspnea, cough, tachypnea, wheezing  GI:  No pain, No nausea, No vomiting, No diarrhea, No constipation, No weight loss, No fever, No pruritis, No rectal bleeding, No melena, No dysphagia  :  No pain, bleeding, incontinence, nocturia  Muscle:  No pain, weakness  Neuro:  No weakness, tingling, memory problems  Psych:  No fatigue, insomnia, mood problems, depression  Endocrine:  No polyuria, polydypsia, cold/heat intolerance  Heme:  No petechiae, ecchymosis, easy bruisability  Skin:  No rash, tattoos, scars, edema      Vital Signs Last 24 Hrs  T(C): 36.1 (21 Sep 2017 04:42), Max: 36.7 (20 Sep 2017 10:00)  T(F): 97 (21 Sep 2017 04:42), Max: 98.1 (20 Sep 2017 10:00)  HR: 63 (21 Sep 2017 04:42) (62 - 68)  BP: 109/58 (21 Sep 2017 04:42) (109/58 - 140/62)  BP(mean): --  RR: 16 (21 Sep 2017 04:42) (16 - 18)  SpO2: 98% (21 Sep 2017 04:42) (96% - 100%)    PHYSICAL EXAM:    Constitutional: NAD  HEENT: EOMI, throat clear  Neck: No LAD, supple  Respiratory: CTA and P  Cardiovascular: S1 and S2, RRR, no M  Gastrointestinal: BS+, soft, NT/ND, neg HSM,  Extremities: No peripheral edema, neg clubbing, cyanosis  Vascular: 2+ peripheral pulses  Neurological: A/O x 3, no focal deficits  Psychiatric: Normal mood, normal affect  Skin: No rashes      LABS:                        7.5    7.87  )-----------( 282      ( 20 Sep 2017 07:17 )             24.4     09-20    142  |  105  |  20  ----------------------------<  103<H>  4.6   |  24  |  0.85    Ca    8.4      20 Sep 2017 07:17            RADIOLOGY & ADDITIONAL TESTS:    < from: Upper EUS (09.20.17 @ 15:37) >    [facility]  _______________________________________________________________________________  Patient Name: Kirt Silverio         Procedure Date: 9/20/2017 3:37 PM  MRN: [id number]                     Account Number: [id number]  YOB: 1955               Admit Type: Inpatient  Room: Cath Lab 1                      Gender: Male  Attending MD: FARA THOMSON MD      _______________________________________________________________________________     Procedure:           Upper EUS  Indications:         62 year old male with a GI bleed. EGD yesterday showing                        a gastric cardia subepithelial lesion. EUS scheduled for                        further evaluation.  Providers:           MD JESUS, VIJAY VELASQUEZ MD (Fellow)  Referring MD:        KALA HERNANDEZ DO  Medicines:           Monitored Anesthesia Care  Complications:       No immediate complications.  Procedure:           After obtaining informed consent, the endoscope was                        passed under direct vision. Throughout the procedure,                        the patient's blood pressure, pulse, and oxygen                        saturations were monitored continuously. The was    introduced through the mouth, and advanced to the second                        part of duodenum. The was introduced through the mouth,                        and advanced to the second part of duodenum.                                      Findings:       EGD:       -The esophagus was normal.       -There was a hiatal hernia.       -There was a 2 cm subepithelial gastric cardia lesion with a smooth        surface.       -The stomach was otherwise normal (apart from the gastric cardia lesion).       -The duodenal bulb and D2 were normal.       EUS: Exam performed with a radial and linear echoendoscope.       -There was a 2 cm complex subepithelial lesion in the cardia. it was        hypoechoic with a shadowing hyperechoic center. FNA was not performed as        this is the likely source of the bleed and I did not want to induce        bleeding and it will require surgical removal regardless.       -There was a 2 cm hypoechoic oval mediastinal lymph node 25 cm from the        incisors. FNA was performed using a 22 gauge Sharkcore needle. Onsite        cytology was not present.                                                                                   Impression:          EGD:                -The esophagus was normal.                       -There was a hiatal hernia.                       -There was a 2 cm subepithelial gastric cardia lesion                        with a smooth surface.                       -The stomach was otherwise normal (apart from the                        gastric cardia lesion).                       -The duodenal bulb and D2 were normal.                       EUS: Exam performed with a radial and linear                        echoendoscope.                    -There was a 2 cm complex subepithelial lesion in the                        cardia. it was hypoechoic with a shadowing hyperechoic                        center. FNA was not performed as this is the likely                        source of the bleed and I did not want to induce                        bleeding and it will require surgical removal regardless.                       -There was a 2 cm hypoechoic oval mediastinal lymph node                        25 cm from the incisors. FNA was performed using a 22                        gauge Sharkcore needle. Onsite cytology was not present.  Recommendation:      - [facility].                       - Follow up cytology.   - Cipro 400 mg Iv x 3 days                       - Follow up thoracic surgery recommendations.                                                                                     ___________________  FARA THOMSON MD  9/20/2017 4:56:14 PM  This report has been signed electronically.  Number of Addenda: 0    Note Initiated On: 9/20/2017 3:37 PM    < end of copied text >

## 2017-09-22 LAB
ALBUMIN SERPL ELPH-MCNC: 3.3 G/DL — SIGNIFICANT CHANGE UP (ref 3.3–5)
ALP SERPL-CCNC: 39 U/L — LOW (ref 40–120)
ALT FLD-CCNC: 15 U/L — SIGNIFICANT CHANGE UP (ref 4–41)
AST SERPL-CCNC: 13 U/L — SIGNIFICANT CHANGE UP (ref 4–40)
BASOPHILS # BLD AUTO: 0.05 K/UL — SIGNIFICANT CHANGE UP (ref 0–0.2)
BASOPHILS NFR BLD AUTO: 0.9 % — SIGNIFICANT CHANGE UP (ref 0–2)
BILIRUB SERPL-MCNC: 0.2 MG/DL — SIGNIFICANT CHANGE UP (ref 0.2–1.2)
BUN SERPL-MCNC: 17 MG/DL — SIGNIFICANT CHANGE UP (ref 7–23)
CALCIUM SERPL-MCNC: 8.3 MG/DL — LOW (ref 8.4–10.5)
CHLORIDE SERPL-SCNC: 103 MMOL/L — SIGNIFICANT CHANGE UP (ref 98–107)
CO2 SERPL-SCNC: 25 MMOL/L — SIGNIFICANT CHANGE UP (ref 22–31)
CREAT SERPL-MCNC: 0.69 MG/DL — SIGNIFICANT CHANGE UP (ref 0.5–1.3)
EOSINOPHIL # BLD AUTO: 0.11 K/UL — SIGNIFICANT CHANGE UP (ref 0–0.5)
EOSINOPHIL NFR BLD AUTO: 2 % — SIGNIFICANT CHANGE UP (ref 0–6)
GLUCOSE SERPL-MCNC: 91 MG/DL — SIGNIFICANT CHANGE UP (ref 70–99)
HCT VFR BLD CALC: 24.3 % — LOW (ref 39–50)
HCT VFR BLD CALC: 26.6 % — LOW (ref 39–50)
HGB BLD-MCNC: 7.5 G/DL — LOW (ref 13–17)
HGB BLD-MCNC: 8.2 G/DL — LOW (ref 13–17)
IMM GRANULOCYTES # BLD AUTO: 0.03 # — SIGNIFICANT CHANGE UP
IMM GRANULOCYTES NFR BLD AUTO: 0.5 % — SIGNIFICANT CHANGE UP (ref 0–1.5)
LYMPHOCYTES # BLD AUTO: 1.31 K/UL — SIGNIFICANT CHANGE UP (ref 1–3.3)
LYMPHOCYTES # BLD AUTO: 23.9 % — SIGNIFICANT CHANGE UP (ref 13–44)
MCHC RBC-ENTMCNC: 25.2 PG — LOW (ref 27–34)
MCHC RBC-ENTMCNC: 25.2 PG — LOW (ref 27–34)
MCHC RBC-ENTMCNC: 30.8 % — LOW (ref 32–36)
MCHC RBC-ENTMCNC: 30.9 % — LOW (ref 32–36)
MCV RBC AUTO: 81.5 FL — SIGNIFICANT CHANGE UP (ref 80–100)
MCV RBC AUTO: 81.8 FL — SIGNIFICANT CHANGE UP (ref 80–100)
MONOCYTES # BLD AUTO: 0.77 K/UL — SIGNIFICANT CHANGE UP (ref 0–0.9)
MONOCYTES NFR BLD AUTO: 14 % — SIGNIFICANT CHANGE UP (ref 2–14)
NEUTROPHILS # BLD AUTO: 3.22 K/UL — SIGNIFICANT CHANGE UP (ref 1.8–7.4)
NEUTROPHILS NFR BLD AUTO: 58.7 % — SIGNIFICANT CHANGE UP (ref 43–77)
NRBC # FLD: 0 — SIGNIFICANT CHANGE UP
NRBC # FLD: 0.02 — SIGNIFICANT CHANGE UP
PLATELET # BLD AUTO: 291 K/UL — SIGNIFICANT CHANGE UP (ref 150–400)
PLATELET # BLD AUTO: 300 K/UL — SIGNIFICANT CHANGE UP (ref 150–400)
PMV BLD: 10.3 FL — SIGNIFICANT CHANGE UP (ref 7–13)
PMV BLD: 9.9 FL — SIGNIFICANT CHANGE UP (ref 7–13)
POTASSIUM SERPL-MCNC: 4.4 MMOL/L — SIGNIFICANT CHANGE UP (ref 3.5–5.3)
POTASSIUM SERPL-SCNC: 4.4 MMOL/L — SIGNIFICANT CHANGE UP (ref 3.5–5.3)
PROT SERPL-MCNC: 5.4 G/DL — LOW (ref 6–8.3)
RBC # BLD: 2.98 M/UL — LOW (ref 4.2–5.8)
RBC # BLD: 3.25 M/UL — LOW (ref 4.2–5.8)
RBC # FLD: 17.6 % — HIGH (ref 10.3–14.5)
RBC # FLD: 17.9 % — HIGH (ref 10.3–14.5)
SODIUM SERPL-SCNC: 140 MMOL/L — SIGNIFICANT CHANGE UP (ref 135–145)
WBC # BLD: 5.49 K/UL — SIGNIFICANT CHANGE UP (ref 3.8–10.5)
WBC # BLD: 6.66 K/UL — SIGNIFICANT CHANGE UP (ref 3.8–10.5)
WBC # FLD AUTO: 5.49 K/UL — SIGNIFICANT CHANGE UP (ref 3.8–10.5)
WBC # FLD AUTO: 6.66 K/UL — SIGNIFICANT CHANGE UP (ref 3.8–10.5)

## 2017-09-22 RX ADMIN — Medication 500 MILLIGRAM(S): at 18:02

## 2017-09-22 RX ADMIN — Medication 500 MILLIGRAM(S): at 05:18

## 2017-09-22 RX ADMIN — PANTOPRAZOLE SODIUM 40 MILLIGRAM(S): 20 TABLET, DELAYED RELEASE ORAL at 18:02

## 2017-09-22 RX ADMIN — Medication 37.5 MILLIGRAM(S): at 21:12

## 2017-09-22 RX ADMIN — Medication 650 MILLIGRAM(S): at 13:51

## 2017-09-22 RX ADMIN — Medication 0.25 MILLIGRAM(S): at 00:15

## 2017-09-22 RX ADMIN — Medication 650 MILLIGRAM(S): at 14:21

## 2017-09-22 RX ADMIN — PANTOPRAZOLE SODIUM 40 MILLIGRAM(S): 20 TABLET, DELAYED RELEASE ORAL at 05:18

## 2017-09-22 NOTE — PROGRESS NOTE ADULT - SUBJECTIVE AND OBJECTIVE BOX
_________________________________________________________________________________________  ========>>  M E D I C A L   A T T E N D I N G    F O L L O W  U P  N O T E  <<=========  -----------------------------------------------------------------------------------------------------    - Patient seen and examined by me approximately thirty minutes ago.  - In summary, patient is a 62y year old man who originally presented with anemia  - Patient today overall doing ok, comfortable, no SOB, palpitations CP. no bleeding reported, light headed at timed  ==================>> MEDICATIONS <<====================    venlafaxine XR. 37.5 milliGRAM(s) Oral at bedtime  pantoprazole  Injectable 40 milliGRAM(s) IV Push two times a day  influenza   Vaccine 0.5 milliLiter(s) IntraMuscular once  ciprofloxacin     Tablet 500 milliGRAM(s) Oral every 12 hours    MEDICATIONS  (PRN):  ALPRAZolam 0.25 milliGRAM(s) Oral at bedtime PRN Anxiety  acetaminophen   Tablet. 650 milliGRAM(s) Oral every 6 hours PRN Mild Pain (1 - 3)    ==================>> REVIEW OF SYSTEM <<=================    GEN: no fever, no chills, no pain  RESP: no SOB, no cough, no sputum  CVS: no chest pain, no palpitations, no edema  GI: no abdominal pain, no nausea, no constipation, no diarrhea, no bleeding noted  : no dysuria, no frequency, no hematuria  Neuro: no headache, + occasional mild lightheadedness  Derm : no itching, no rash    ==================>> VITAL SIGNS <<==================    Vital Signs Last 24 Hrs  T(C): 37.2 (09-22-17 @ 13:34)  T(F): 99 (09-22-17 @ 13:34), Max: 99 (09-22-17 @ 13:34)  HR: 69 (09-22-17 @ 13:34) (60 - 69)  BP: 127/68 (09-22-17 @ 13:34)  BP(mean): --  RR: 16 (09-22-17 @ 13:34) (16 - 17)  SpO2: 99% (09-22-17 @ 13:34) (98% - 100%)      ==================>> PHYSICAL EXAM <<=================    GEN: A&O X 3 , NAD , comfortable  HEENT: NCAT, PERRL, MMM, hearing intact  Neck: supple , no JVD  CVS: S1S2 , regular , No M/R/G appreciated  PULM: CTA B/L,  no W/R/R appreciated  ABD.: soft. non tender, non distended,  bowel sounds present  Extrem: intact pulses , no edema   PSYCH : normal mood,  not anxious     ==================>> LAB AND IMAGING <<==================                          7.5    5.49  )-----------( 291      ( 22 Sep 2017 05:30 )             24.3        Hemoglobin:   7.5  (09-22 @ 05:30)   Hemoglobin:   8.5  (09-21 @ 06:02)   Hemoglobin:   7.5  (09-20 @ 07:17)   Hemoglobin:   7.8  (09-19 @ 07:30)   Hemoglobin:   7.0  (09-19 @ 00:30)     09-22    140  |  103  |  17  ----------------------------<  91  4.4   |  25  |  0.69    Ca    8.3<L>      22 Sep 2017 05:30    TPro  5.4<L>  /  Alb  3.3  /  TBili  0.2  /  DBili  x   /  AST  13  /  ALT  15  /  AlkPhos  39<L>  09-22    Creatinine:  0.69   (09-22 @ 05:30)  Creatinine:  0.78   (09-21 @ 06:02)  Creatinine:  0.85   (09-20 @ 07:17)     TSH:      1.60   (09-18-17)           Lipid profile:  (09-18-17)     Total: 116     LDL  : 80     HDL  :26     TG   :71     HgA1C: 6.2  (09-18-17)              pathology pending  ___________________________________________________________________________________  ===============>>  A S S E S S M E N T   A N D   P L A N <<===============  ------------------------------------------------------------------------------------------    · Assessment		  61 y/o Male  PMH PUD with hx of bleeding ulcers in the past, d/c'd 8 days ago from [place] after admission for anemia sent back to ED for generalized weakness, fatigue, exertional SOB. Pt was transfused 5U PRBC during recent admission. Had negative occult blood at that time and capsule endoscopy was decided on for further w/u. Turned in capsule 2 days ago but no results yet. Seen by GI and Hematology during recent admission, but refused bone marrow biopsy. H/H 8/18/17 12/7/40/8; H/H upon admission in ED 6.2/20.5. Not on blood thinners. Unclear source for blood loss/etiology of anemia, suspected to be GI in nature.   Occult stool + in the ER tonight,     Problem/Plan - 1:  ·  Problem: GI bleed, likely due to gastric mass found on EGD  GI f/u appreciated, awaiting pathology  Thoracic surgery appreciated: eventual surgical resection  monitor H/H closely: transfuse as needed  PPI  will check CBC more closely given decreased H/H    Problem/Plan - 2:  ·  Problem: Anxiety  on Xanax PRN, Effexor     Problem/Plan - 3:  ·  Problem: HTN   Hold Losartan for Now, BP Stable.     Problem/Plan - 4:  ·  Problem: Back pain.  HX of Disc Herniation   on Tramadol PRN at home.     -GI/DVT Prophylaxis: PPI, ambulate    --------------------------------------------  Case discussed with pt, GI, family, NP  Education given on plan of care  ___________________________  H. DORY Santos.  Pager: [phone]

## 2017-09-22 NOTE — PROGRESS NOTE ADULT - SUBJECTIVE AND OBJECTIVE BOX
INTERVAL HPI/OVERNIGHT EVENTS:    pt reports  no abdominal pain, nausea or vomiting  +brown bm yesterday  no bm yet this morning  ate breakfast   "trying to catch up on sleep"    MEDICATIONS  (STANDING):  venlafaxine XR. 37.5 milliGRAM(s) Oral at bedtime  pantoprazole  Injectable 40 milliGRAM(s) IV Push two times a day  influenza   Vaccine 0.5 milliLiter(s) IntraMuscular once  ciprofloxacin     Tablet 500 milliGRAM(s) Oral every 12 hours    MEDICATIONS  (PRN):  ALPRAZolam 0.25 milliGRAM(s) Oral at bedtime PRN Anxiety  acetaminophen   Tablet. 650 milliGRAM(s) Oral every 6 hours PRN Mild Pain (1 - 3)      Allergies    No Known Allergies    Intolerances        Review of Systems:    General:  No wt loss, fevers, chills, night sweats, fatigue   Eyes:  Good vision, no reported pain  ENT:  No sore throat, pain, runny nose, dysphagia  CV:  No pain, palpitations, hypo/hypertension  Resp:  No dyspnea, cough, tachypnea, wheezing  GI:  No pain, No nausea, No vomiting, No diarrhea, No constipation, No weight loss, No fever, No pruritis, No rectal bleeding, No melena, No dysphagia  :  No pain, bleeding, incontinence, nocturia  Muscle:  No pain, weakness  Neuro:  No weakness, tingling, memory problems  Psych:  No fatigue, insomnia, mood problems, depression  Endocrine:  No polyuria, polydypsia, cold/heat intolerance  Heme:  No petechiae, ecchymosis, easy bruisability  Skin:  No rash, tattoos, scars, edema      Vital Signs Last 24 Hrs  T(C): 36.7 (22 Sep 2017 05:14), Max: 36.7 (21 Sep 2017 14:33)  T(F): 98 (22 Sep 2017 05:14), Max: 98.1 (21 Sep 2017 14:33)  HR: 60 (22 Sep 2017 05:14) (60 - 79)  BP: 122/80 (22 Sep 2017 05:14) (122/80 - 139/65)  BP(mean): --  RR: 16 (22 Sep 2017 05:14) (16 - 17)  SpO2: 100% (22 Sep 2017 05:14) (98% - 100%)    PHYSICAL EXAM:    Constitutional: NAD  HEENT: EOMI, throat clear  Neck: No LAD, supple  Respiratory: CTA and P  Cardiovascular: S1 and S2, RRR, no M  Gastrointestinal: BS+, soft, NT/ND, neg HSM,  Extremities: No peripheral edema, neg clubbing, cyanosis  Vascular: 2+ peripheral pulses  Neurological: A/O x 3, no focal deficits  Psychiatric: Normal mood, normal affect  Skin: No rashes      LABS:                        7.5    5.49  )-----------( 291      ( 22 Sep 2017 05:30 )             24.3     09-22    140  |  103  |  17  ----------------------------<  91  4.4   |  25  |  0.69    Ca    8.3<L>      22 Sep 2017 05:30    TPro  5.4<L>  /  Alb  3.3  /  TBili  0.2  /  DBili  x   /  AST  13  /  ALT  15  /  AlkPhos  39<L>  09-22          RADIOLOGY & ADDITIONAL TESTS:

## 2017-09-22 NOTE — PROGRESS NOTE ADULT - PROBLEM SELECTOR PLAN 1
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

## 2017-09-23 LAB
HCT VFR BLD CALC: 24.6 % — LOW (ref 39–50)
HCT VFR BLD CALC: 24.9 % — LOW (ref 39–50)
HCT VFR BLD CALC: 26.5 % — LOW (ref 39–50)
HGB BLD-MCNC: 7.5 G/DL — LOW (ref 13–17)
HGB BLD-MCNC: 7.6 G/DL — LOW (ref 13–17)
HGB BLD-MCNC: 8.1 G/DL — LOW (ref 13–17)
MCHC RBC-ENTMCNC: 24.8 PG — LOW (ref 27–34)
MCHC RBC-ENTMCNC: 24.8 PG — LOW (ref 27–34)
MCHC RBC-ENTMCNC: 24.9 PG — LOW (ref 27–34)
MCHC RBC-ENTMCNC: 30.5 % — LOW (ref 32–36)
MCHC RBC-ENTMCNC: 30.5 % — LOW (ref 32–36)
MCHC RBC-ENTMCNC: 30.6 % — LOW (ref 32–36)
MCV RBC AUTO: 81 FL — SIGNIFICANT CHANGE UP (ref 80–100)
MCV RBC AUTO: 81.4 FL — SIGNIFICANT CHANGE UP (ref 80–100)
MCV RBC AUTO: 81.7 FL — SIGNIFICANT CHANGE UP (ref 80–100)
NON-GYNECOLOGICAL CYTOLOGY STUDY: SIGNIFICANT CHANGE UP
NRBC # FLD: 0 — SIGNIFICANT CHANGE UP
PLATELET # BLD AUTO: 296 K/UL — SIGNIFICANT CHANGE UP (ref 150–400)
PLATELET # BLD AUTO: 306 K/UL — SIGNIFICANT CHANGE UP (ref 150–400)
PLATELET # BLD AUTO: 308 K/UL — SIGNIFICANT CHANGE UP (ref 150–400)
PMV BLD: 10.5 FL — SIGNIFICANT CHANGE UP (ref 7–13)
PMV BLD: 9.7 FL — SIGNIFICANT CHANGE UP (ref 7–13)
PMV BLD: 9.9 FL — SIGNIFICANT CHANGE UP (ref 7–13)
RBC # BLD: 3.01 M/UL — LOW (ref 4.2–5.8)
RBC # BLD: 3.06 M/UL — LOW (ref 4.2–5.8)
RBC # BLD: 3.27 M/UL — LOW (ref 4.2–5.8)
RBC # FLD: 17.6 % — HIGH (ref 10.3–14.5)
RBC # FLD: 17.9 % — HIGH (ref 10.3–14.5)
RBC # FLD: 18 % — HIGH (ref 10.3–14.5)
WBC # BLD: 5.96 K/UL — SIGNIFICANT CHANGE UP (ref 3.8–10.5)
WBC # BLD: 7.11 K/UL — SIGNIFICANT CHANGE UP (ref 3.8–10.5)
WBC # BLD: 7.53 K/UL — SIGNIFICANT CHANGE UP (ref 3.8–10.5)
WBC # FLD AUTO: 5.96 K/UL — SIGNIFICANT CHANGE UP (ref 3.8–10.5)
WBC # FLD AUTO: 7.11 K/UL — SIGNIFICANT CHANGE UP (ref 3.8–10.5)
WBC # FLD AUTO: 7.53 K/UL — SIGNIFICANT CHANGE UP (ref 3.8–10.5)

## 2017-09-23 RX ADMIN — Medication 500 MILLIGRAM(S): at 05:22

## 2017-09-23 RX ADMIN — Medication 0.25 MILLIGRAM(S): at 00:28

## 2017-09-23 RX ADMIN — Medication 37.5 MILLIGRAM(S): at 22:20

## 2017-09-23 RX ADMIN — PANTOPRAZOLE SODIUM 40 MILLIGRAM(S): 20 TABLET, DELAYED RELEASE ORAL at 05:22

## 2017-09-23 RX ADMIN — Medication 650 MILLIGRAM(S): at 16:13

## 2017-09-23 RX ADMIN — Medication 650 MILLIGRAM(S): at 16:43

## 2017-09-23 RX ADMIN — PANTOPRAZOLE SODIUM 40 MILLIGRAM(S): 20 TABLET, DELAYED RELEASE ORAL at 18:10

## 2017-09-23 NOTE — PROGRESS NOTE ADULT - SUBJECTIVE AND OBJECTIVE BOX
Patient with hiatal hernia and mass at GE junction s/p EUS with biopsy known to Dr Santo.  Awaiting pathology.  Possible procedure in the future with thoracic surgery.    Call [phone] with concerns.

## 2017-09-23 NOTE — PROGRESS NOTE ADULT - SUBJECTIVE AND OBJECTIVE BOX
INTERVAL HPI/OVERNIGHT EVENTS: Pt s/e at bedside no new complaints     MEDICATIONS  (STANDING):  venlafaxine XR. 37.5 milliGRAM(s) Oral at bedtime  pantoprazole  Injectable 40 milliGRAM(s) IV Push two times a day  influenza   Vaccine 0.5 milliLiter(s) IntraMuscular once    MEDICATIONS  (PRN):  ALPRAZolam 0.25 milliGRAM(s) Oral at bedtime PRN Anxiety  acetaminophen   Tablet. 650 milliGRAM(s) Oral every 6 hours PRN Mild Pain (1 - 3)      Allergies    No Known Allergies    Intolerances        ROS:   General:  No wt loss, fevers, chills, night sweats, fatigue,   Eyes:  Good vision, no reported pain  ENT:  No sore throat, pain, runny nose, dysphagia  CV:  No pain, palpitations, hypo/hypertension  Resp:  No dyspnea, cough, tachypnea, wheezing  GI:  No pain, No nausea, No vomiting, No diarrhea, No constipation, No weight loss, No fever, No pruritis, No rectal bleeding, No tarry stools, No dysphagia,  :  No pain, bleeding, incontinence, nocturia  Muscle:  No pain, weakness  Neuro:  No weakness, tingling, memory problems  Psych:  No fatigue, insomnia, mood problems, depression  Endocrine:  No polyuria, polydipsia, cold/heat intolerance  Heme:  No petechiae, ecchymosis, easy bruisability  Skin:  No rash, tattoos, scars, edema      PHYSICAL EXAM:   Vital Signs:  Vital Signs Last 24 Hrs  T(C): 37 (23 Sep 2017 05:24), Max: 37 (23 Sep 2017 05:24)  T(F): 98.6 (23 Sep 2017 05:24), Max: 98.6 (23 Sep 2017 05:24)  HR: 68 (23 Sep 2017 05:24) (68 - 69)  BP: 110/58 (23 Sep 2017 05:24) (110/58 - 120/53)  BP(mean): --  RR: 17 (23 Sep 2017 05:24) (17 - 18)  SpO2: 100% (23 Sep 2017 05:24) (99% - 100%)  Daily     Daily     GENERAL:  Appears stated age, well-groomed, well-nourished, no distress  HEENT:  NC/AT,  conjunctivae clear and pink, no thyromegaly, nodules, adenopathy, no JVD, sclera -anicteric  CHEST:  Full & symmetric excursion, no increased effort, breath sounds clear  HEART:  Regular rhythm, S1, S2, no murmur/rub/S3/S4, no abdominal bruit, no edema  ABDOMEN:  Soft, non-tender, non-distended, normoactive bowel sounds,  no masses ,no hepato-splenomegaly, no signs of chronic liver disease  EXTEREMITIES:  no cyanosis,clubbing or edema  SKIN:  No rash/erythema/ecchymoses/petechiae/wounds/abscess/warm/dry  NEURO:  Alert, oriented, no asterixis, no tremor, no encephalopathy      LABS:                        7.6    5.96  )-----------( 296      ( 23 Sep 2017 07:01 )             24.9     09-22    140  |  103  |  17  ----------------------------<  91  4.4   |  25  |  0.69    Ca    8.3<L>      22 Sep 2017 05:30    TPro  5.4<L>  /  Alb  3.3  /  TBili  0.2  /  DBili  x   /  AST  13  /  ALT  15  /  AlkPhos  39<L>  09-22          RADIOLOGY & ADDITIONAL TESTS:

## 2017-09-23 NOTE — PROGRESS NOTE ADULT - SUBJECTIVE AND OBJECTIVE BOX
_________________________________________________________________________________________  ========>>  M E D I C A L   A T T E N D I N G    F O L L O W  U P  N O T E  <<=========  -----------------------------------------------------------------------------------------------------    - Patient seen and examined by me approximately thirty minutes ago.  - In summary, patient is a 62y year old man who originally presented with anemia  - Patient today overall doing ok, comfortable, no SOB, palpitations CP. no bleeding reported, slept well.    ==================>> MEDICATIONS <<====================    venlafaxine XR. 37.5 milliGRAM(s) Oral at bedtime  pantoprazole  Injectable 40 milliGRAM(s) IV Push two times a day  influenza   Vaccine 0.5 milliLiter(s) IntraMuscular once    MEDICATIONS  (PRN):  ALPRAZolam 0.25 milliGRAM(s) Oral at bedtime PRN Anxiety  acetaminophen   Tablet. 650 milliGRAM(s) Oral every 6 hours PRN Mild Pain (1 - 3)    ==================>> REVIEW OF SYSTEM <<=================    GEN: no fever, no chills, no pain  RESP: no SOB, no cough, no sputum  CVS: no chest pain, no palpitations, no edema  GI: no abdominal pain, no nausea, no constipation, no diarrhea, no bleeding noted  : no dysuria, no frequency, no hematuria  Neuro: no headache, + occasional mild lightheadedness  Derm : no itching, no rash    ==================>> VITAL SIGNS <<==================    Vital Signs Last 24 Hrs  T(C): 37 (09-23-17 @ 05:24)  T(F): 98.6 (09-23-17 @ 05:24), Max: 99 (09-22-17 @ 13:34)  HR: 68 (09-23-17 @ 05:24) (68 - 69)  BP: 110/58 (09-23-17 @ 05:24)  BP(mean): --  RR: 17 (09-23-17 @ 05:24) (16 - 18)  SpO2: 100% (09-23-17 @ 05:24) (99% - 100%)      ==================>> PHYSICAL EXAM <<=================    GEN: A&O X 3 , NAD , comfortable  HEENT: NCAT, PERRL, MMM, hearing intact  Neck: supple , no JVD  CVS: S1S2 , regular , No M/R/G appreciated  PULM: CTA B/L,  no W/R/R appreciated  ABD.: soft. non tender, non distended,  bowel sounds present  Extrem: intact pulses , no edema   PSYCH : normal mood,  not anxious     ==================>> LAB AND IMAGING <<==================                          7.6    5.96  )-----------( 296      ( 23 Sep 2017 07:01 )             24.9     Hemoglobin:   7.6  (09-23 @ 07:01)   Hemoglobin:   8.2  (09-22 @ 18:25)   Hemoglobin:   7.5  (09-22 @ 05:30)   Hemoglobin:   8.5  (09-21 @ 06:02)   Hemoglobin:   7.5  (09-20 @ 07:17)        09-22    140  |  103  |  17  ----------------------------<  91  4.4   |  25  |  0.69    Ca    8.3<L>      22 Sep 2017 05:30    TPro  5.4<L>  /  Alb  3.3  /  TBili  0.2  /  DBili  x   /  AST  13  /  ALT  15  /  AlkPhos  39<L>  09-22    pathology pending  ___________________________________________________________________________________  ===============>>  A S S E S S M E N T   A N D   P L A N <<===============  ------------------------------------------------------------------------------------------    · Assessment		  61 y/o Male  PMH PUD with hx of bleeding ulcers in the past, presented to ED for generalized weakness, fatigue, exertional SOB. Pt was transfused 5U PRBC during recent admission. Had negative occult blood at that time and capsule endoscopy was decided on for further w/u. Seen by GI and Hematology during recent admission, but refused bone marrow biopsy. H/H 8/18/17 12/7/40/8; H/H upon admission in ED 6.2/20.5.     Problem/Plan - 1:  ·  Problem: GI bleed, likely due to gastric mass found on EGD  GI f/u appreciated, awaiting pathology  Thoracic surgery appreciated: eventual surgical resection  monitor H/H closely: transfuse as needed  PPI  will check CBC more closely given decreased H/H    Problem/Plan - 2:  ·  Problem: Anxiety  on Xanax PRN, Effexor     Problem/Plan - 3:  ·  Problem: HTN   Hold Losartan for Now, BP Stable.     Problem/Plan - 4:  ·  Problem: Back pain.  HX of Disc Herniation   on Tramadol PRN at home.     -GI/DVT Prophylaxis: PPI, ambulate    --------------------------------------------  Case discussed with pt  Education given on plan of care  ___________________________  H. DORY Santos.  Pager: [phone]

## 2017-09-23 NOTE — PROGRESS NOTE ADULT - PROBLEM SELECTOR PLAN 1
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

## 2017-09-24 LAB
HCT VFR BLD CALC: 24.1 % — LOW (ref 39–50)
HGB BLD-MCNC: 7.3 G/DL — LOW (ref 13–17)
MCHC RBC-ENTMCNC: 24.7 PG — LOW (ref 27–34)
MCHC RBC-ENTMCNC: 30.3 % — LOW (ref 32–36)
MCV RBC AUTO: 81.7 FL — SIGNIFICANT CHANGE UP (ref 80–100)
NRBC # FLD: 0 — SIGNIFICANT CHANGE UP
PLATELET # BLD AUTO: 278 K/UL — SIGNIFICANT CHANGE UP (ref 150–400)
PMV BLD: 9.9 FL — SIGNIFICANT CHANGE UP (ref 7–13)
RBC # BLD: 2.95 M/UL — LOW (ref 4.2–5.8)
RBC # FLD: 18 % — HIGH (ref 10.3–14.5)
WBC # BLD: 6.01 K/UL — SIGNIFICANT CHANGE UP (ref 3.8–10.5)
WBC # FLD AUTO: 6.01 K/UL — SIGNIFICANT CHANGE UP (ref 3.8–10.5)

## 2017-09-24 PROCEDURE — 93306 TTE W/DOPPLER COMPLETE: CPT | Mod: 26

## 2017-09-24 RX ORDER — IRON SUCROSE 20 MG/ML
200 INJECTION, SOLUTION INTRAVENOUS DAILY
Qty: 0 | Refills: 0 | Status: COMPLETED | OUTPATIENT
Start: 2017-09-24 | End: 2017-09-26

## 2017-09-24 RX ADMIN — PANTOPRAZOLE SODIUM 40 MILLIGRAM(S): 20 TABLET, DELAYED RELEASE ORAL at 06:11

## 2017-09-24 RX ADMIN — Medication 650 MILLIGRAM(S): at 18:59

## 2017-09-24 RX ADMIN — Medication 37.5 MILLIGRAM(S): at 23:51

## 2017-09-24 RX ADMIN — IRON SUCROSE 110 MILLIGRAM(S): 20 INJECTION, SOLUTION INTRAVENOUS at 23:50

## 2017-09-24 RX ADMIN — PANTOPRAZOLE SODIUM 40 MILLIGRAM(S): 20 TABLET, DELAYED RELEASE ORAL at 17:49

## 2017-09-24 RX ADMIN — Medication 650 MILLIGRAM(S): at 18:22

## 2017-09-24 RX ADMIN — Medication 0.25 MILLIGRAM(S): at 01:05

## 2017-09-24 NOTE — PROGRESS NOTE ADULT - SUBJECTIVE AND OBJECTIVE BOX
_________________________________________________________________________________________  ========>>  M E D I C A L   A T T E N D I N G    F O L L O W  U P  N O T E  <<=========  -----------------------------------------------------------------------------------------------------    - Patient seen and examined by me approximately thirty minutes ago.  - In summary, patient is a 62y year old man who originally presented with anemia  - Patient today overall doing ok, comfortable, no SOB, palpitations CP. no bleeding reported, slept well, though feels tired    ==================>> MEDICATIONS <<====================    venlafaxine XR. 37.5 milliGRAM(s) Oral at bedtime  pantoprazole  Injectable 40 milliGRAM(s) IV Push two times a day  influenza   Vaccine 0.5 milliLiter(s) IntraMuscular once    MEDICATIONS  (PRN):  acetaminophen   Tablet. 650 milliGRAM(s) Oral every 6 hours PRN Mild Pain (1 - 3)    ==================>> REVIEW OF SYSTEM <<=================    GEN: no fever, no chills, no pain  RESP: no SOB, no cough, no sputum  CVS: no chest pain, no palpitations, no edema  GI: no abdominal pain, no nausea, no constipation, no diarrhea, no bleeding noted  : no dysuria, no frequency, no hematuria  Neuro: no headache, + occasional mild lightheadedness  Derm : no itching, no rash    ==================>> VITAL SIGNS <<==================    Vital Signs Last 24 Hrs  T(C): 36.7 (09-24-17 @ 06:12)  T(F): 98.1 (09-24-17 @ 06:12), Max: 98.1 (09-24-17 @ 06:12)  HR: 64 (09-24-17 @ 06:12) (64 - 65)  BP: 108/52 (09-24-17 @ 06:12)  BP(mean): --  RR: 18 (09-24-17 @ 06:12) (17 - 18)  SpO2: 97% (09-24-17 @ 06:12) (97% - 98%)      ==================>> PHYSICAL EXAM <<=================    GEN: A&O X 3 , NAD , comfortable  HEENT: NCAT, PERRL, MMM, hearing intact  Neck: supple , no JVD  CVS: S1S2 , regular , No M/R/G appreciated  PULM: CTA B/L,  no W/R/R appreciated  ABD.: soft. non tender, non distended,  bowel sounds present  Extrem: intact pulses , no edema   PSYCH : normal mood,  not anxious     ==================>> LAB AND IMAGING <<==================                          7.3    6.01  )-----------( 278      ( 24 Sep 2017 06:33 )             24.1        Hemoglobin:   7.3  (09-24 @ 06:33)   Hemoglobin:   7.5  (09-23 @ 22:15)   Hemoglobin:   8.1  (09-23 @ 14:25)   Hemoglobin:   7.6  (09-23 @ 07:01)   Hemoglobin:   8.2  (09-22 @ 18:25)     pathology pending  ___________________________________________________________________________________  ===============>>  A S S E S S M E N T   A N D   P L A N <<===============  ------------------------------------------------------------------------------------------    · Assessment		  63 y/o Male  PMH PUD with hx of bleeding ulcers in the past, presented to ED for generalized weakness, fatigue, exertional SOB. Pt was transfused 5U PRBC during recent admission. Had negative occult blood at that time and capsule endoscopy was decided on for further w/u. Seen by GI and Hematology during recent admission, but refused bone marrow biopsy. H/H 8/18/17 12/7/40/8; H/H upon admission in ED 6.2/20.5.     Problem/Plan - 1:  ·  Problem: GI bleed, likely due to gastric mass found on EGD  GI f/u appreciated, awaiting pathology  Thoracic surgery appreciated: eventual surgical resection  monitor H/H closely: transfuse as needed  PPI  IV iron ordered as well    Problem/Plan - 2:  ·  Problem: Anxiety  on Xanax PRN, Effexor  pt reassured     Problem/Plan - 3:  ·  Problem: HTN   Hold Losartan for Now, BP Stable.     Problem/Plan - 4:  ·  Problem: Back pain.  HX of Disc Herniation   on Tramadol PRN at home.     -GI/DVT Prophylaxis: PPI, ambulate    --------------------------------------------  Case discussed with pt  Education given on plan of care  ___________________________  H. DORY Santos.  Pager: [phone]

## 2017-09-24 NOTE — PROGRESS NOTE ADULT - SUBJECTIVE AND OBJECTIVE BOX
INTERVAL HPI/OVERNIGHT EVENTS: Pt s/e at bedside, no new complaints    MEDICATIONS  (STANDING):  venlafaxine XR. 37.5 milliGRAM(s) Oral at bedtime  pantoprazole  Injectable 40 milliGRAM(s) IV Push two times a day  influenza   Vaccine 0.5 milliLiter(s) IntraMuscular once    MEDICATIONS  (PRN):  acetaminophen   Tablet. 650 milliGRAM(s) Oral every 6 hours PRN Mild Pain (1 - 3)      Allergies    No Known Allergies    Intolerances        ROS:   General:  No wt loss, fevers, chills, night sweats, fatigue,   Eyes:  Good vision, no reported pain  ENT:  No sore throat, pain, runny nose, dysphagia  CV:  No pain, palpitations, hypo/hypertension  Resp:  No dyspnea, cough, tachypnea, wheezing  GI:  No pain, No nausea, No vomiting, No diarrhea, No constipation, No weight loss, No fever, No pruritis, No rectal bleeding, No tarry stools, No dysphagia,  :  No pain, bleeding, incontinence, nocturia  Muscle:  No pain, weakness  Neuro:  No weakness, tingling, memory problems  Psych:  No fatigue, insomnia, mood problems, depression  Endocrine:  No polyuria, polydipsia, cold/heat intolerance  Heme:  No petechiae, ecchymosis, easy bruisability  Skin:  No rash, tattoos, scars, edema      PHYSICAL EXAM:   Vital Signs:  Vital Signs Last 24 Hrs  T(C): 36.7 (24 Sep 2017 06:12), Max: 36.9 (23 Sep 2017 13:00)  T(F): 98.1 (24 Sep 2017 06:12), Max: 98.5 (23 Sep 2017 13:00)  HR: 64 (24 Sep 2017 06:12) (64 - 66)  BP: 108/52 (24 Sep 2017 06:12) (108/52 - 123/54)  BP(mean): --  RR: 18 (24 Sep 2017 06:12) (16 - 18)  SpO2: 97% (24 Sep 2017 06:12) (97% - 98%)  Daily     Daily     GENERAL:  Appears stated age, well-groomed, well-nourished, no distress  HEENT:  NC/AT,  conjunctivae clear and pink, no thyromegaly, nodules, adenopathy, no JVD, sclera -anicteric  CHEST:  Full & symmetric excursion, no increased effort, breath sounds clear  HEART:  Regular rhythm, S1, S2, no murmur/rub/S3/S4, no abdominal bruit, no edema  ABDOMEN:  Soft, non-tender, non-distended, normoactive bowel sounds,  no masses ,no hepato-splenomegaly, no signs of chronic liver disease  EXTEREMITIES:  no cyanosis,clubbing or edema  SKIN:  No rash/erythema/ecchymoses/petechiae/wounds/abscess/warm/dry  NEURO:  Alert, oriented, no asterixis, no tremor, no encephalopathy      LABS:                        7.3    6.01  )-----------( 278      ( 24 Sep 2017 06:33 )             24.1                 RADIOLOGY & ADDITIONAL TESTS:

## 2017-09-24 NOTE — PROGRESS NOTE ADULT - PROBLEM SELECTOR PLAN 1
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

## 2017-09-24 NOTE — PROGRESS NOTE ADULT - SUBJECTIVE AND OBJECTIVE BOX
pt s/ c/o. seen with dr medina. pathology on mediastinal LN, negative for malignancy. Currently hct stable at 24.    Vital Signs Last 24 Hrs  T(C): 36.7 (24 Sep 2017 06:12), Max: 36.7 (24 Sep 2017 06:12)  T(F): 98.1 (24 Sep 2017 06:12), Max: 98.1 (24 Sep 2017 06:12)  HR: 64 (24 Sep 2017 06:12) (64 - 65)  BP: 108/52 (24 Sep 2017 06:12) (108/52 - 123/54)  BP(mean): --  RR: 18 (24 Sep 2017 06:12) (17 - 18)  SpO2: 97% (24 Sep 2017 06:12) (97% - 98%)    MEDICATIONS  (STANDING):  venlafaxine XR. 37.5 milliGRAM(s) Oral at bedtime  pantoprazole  Injectable 40 milliGRAM(s) IV Push two times a day  influenza   Vaccine 0.5 milliLiter(s) IntraMuscular once  iron sucrose IVPB 200 milliGRAM(s) IV Intermittent daily    MEDICATIONS  (PRN):  acetaminophen   Tablet. 650 milliGRAM(s) Oral every 6 hours PRN Mild Pain (1 - 3)

## 2017-09-25 LAB
BUN SERPL-MCNC: 25 MG/DL — HIGH (ref 7–23)
CALCIUM SERPL-MCNC: 8.5 MG/DL — SIGNIFICANT CHANGE UP (ref 8.4–10.5)
CHLORIDE SERPL-SCNC: 104 MMOL/L — SIGNIFICANT CHANGE UP (ref 98–107)
CO2 SERPL-SCNC: 26 MMOL/L — SIGNIFICANT CHANGE UP (ref 22–31)
CREAT SERPL-MCNC: 0.89 MG/DL — SIGNIFICANT CHANGE UP (ref 0.5–1.3)
GLUCOSE SERPL-MCNC: 99 MG/DL — SIGNIFICANT CHANGE UP (ref 70–99)
HCT VFR BLD CALC: 24.8 % — LOW (ref 39–50)
HGB BLD-MCNC: 7.4 G/DL — LOW (ref 13–17)
MCHC RBC-ENTMCNC: 24.2 PG — LOW (ref 27–34)
MCHC RBC-ENTMCNC: 29.8 % — LOW (ref 32–36)
MCV RBC AUTO: 81 FL — SIGNIFICANT CHANGE UP (ref 80–100)
NRBC # FLD: 0 — SIGNIFICANT CHANGE UP
PLATELET # BLD AUTO: 296 K/UL — SIGNIFICANT CHANGE UP (ref 150–400)
PMV BLD: 10 FL — SIGNIFICANT CHANGE UP (ref 7–13)
POTASSIUM SERPL-MCNC: 4.2 MMOL/L — SIGNIFICANT CHANGE UP (ref 3.5–5.3)
POTASSIUM SERPL-SCNC: 4.2 MMOL/L — SIGNIFICANT CHANGE UP (ref 3.5–5.3)
RBC # BLD: 3.06 M/UL — LOW (ref 4.2–5.8)
RBC # FLD: 18.7 % — HIGH (ref 10.3–14.5)
SODIUM SERPL-SCNC: 141 MMOL/L — SIGNIFICANT CHANGE UP (ref 135–145)
SURGICAL PATHOLOGY STUDY: SIGNIFICANT CHANGE UP
WBC # BLD: 6.96 K/UL — SIGNIFICANT CHANGE UP (ref 3.8–10.5)
WBC # FLD AUTO: 6.96 K/UL — SIGNIFICANT CHANGE UP (ref 3.8–10.5)

## 2017-09-25 RX ORDER — FERROUS SULFATE 325(65) MG
325 TABLET ORAL
Qty: 0 | Refills: 0 | Status: DISCONTINUED | OUTPATIENT
Start: 2017-09-25 | End: 2017-10-01

## 2017-09-25 RX ORDER — ALPRAZOLAM 0.25 MG
0.25 TABLET ORAL AT BEDTIME
Qty: 0 | Refills: 0 | Status: DISCONTINUED | OUTPATIENT
Start: 2017-09-25 | End: 2017-10-01

## 2017-09-25 RX ADMIN — Medication 325 MILLIGRAM(S): at 17:55

## 2017-09-25 RX ADMIN — PANTOPRAZOLE SODIUM 40 MILLIGRAM(S): 20 TABLET, DELAYED RELEASE ORAL at 17:56

## 2017-09-25 RX ADMIN — PANTOPRAZOLE SODIUM 40 MILLIGRAM(S): 20 TABLET, DELAYED RELEASE ORAL at 06:39

## 2017-09-25 RX ADMIN — Medication 1 TABLET(S): at 17:55

## 2017-09-25 RX ADMIN — Medication 0.25 MILLIGRAM(S): at 01:07

## 2017-09-25 RX ADMIN — Medication 37.5 MILLIGRAM(S): at 23:08

## 2017-09-25 RX ADMIN — IRON SUCROSE 110 MILLIGRAM(S): 20 INJECTION, SOLUTION INTRAVENOUS at 11:55

## 2017-09-25 NOTE — PROGRESS NOTE ADULT - ATTENDING COMMENTS
pathology reviewed, negative for malignancy  unclear as to what this lesion is but I do think its responsible for his anemia  hard to justify doing partial esophagectomy for benign lesion  case d/w Dr. Meyer, who thinks he can perform endoscopic submucosal dissection

## 2017-09-25 NOTE — PROGRESS NOTE ADULT - PROBLEM SELECTOR PLAN 1
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

## 2017-09-25 NOTE — PROGRESS NOTE ADULT - SUBJECTIVE AND OBJECTIVE BOX
_________________________________________________________________________________________  ========>>  M E D I C A L   A T T E N D I N G    F O L L O W  U P  N O T E  <<=========  -----------------------------------------------------------------------------------------------------    - Patient seen and examined by me approximately thirty minutes ago.  - In summary, patient is a 62y year old man who originally presented with anemia  - Patient today overall doing ok, comfortable, no SOB, palpitations CP. no bleeding reported, slept well, though feels tired    ==================>> MEDICATIONS <<====================    venlafaxine XR. 37.5 milliGRAM(s) Oral at bedtime  pantoprazole  Injectable 40 milliGRAM(s) IV Push two times a day  influenza   Vaccine 0.5 milliLiter(s) IntraMuscular once  iron sucrose IVPB 200 milliGRAM(s) IV Intermittent daily  multivitamin 1 Tablet(s) Oral daily    MEDICATIONS  (PRN):  acetaminophen   Tablet. 650 milliGRAM(s) Oral every 6 hours PRN Mild Pain (1 - 3)  ALPRAZolam 0.25 milliGRAM(s) Oral at bedtime PRN Anxiety    ==================>> REVIEW OF SYSTEM <<=================    GEN: no fever, no chills, no pain  RESP: no SOB, no cough, no sputum  CVS: no chest pain, no palpitations, no edema  GI: no abdominal pain, no nausea, no constipation, no diarrhea, no bleeding noted  : no dysuria, no frequency, no hematuria  Neuro: no headache, + occasional mild lightheadedness  Derm : no itching, no rash    ==================>> VITAL SIGNS <<==================    Vital Signs Last 24 Hrs  T(C): 37 (09-25-17 @ 06:31)  T(F): 98.6 (09-25-17 @ 06:31), Max: 98.6 (09-25-17 @ 06:31)  HR: 61 (09-25-17 @ 06:31) (60 - 62)  BP: 115/53 (09-25-17 @ 06:31)  BP(mean): --  RR: 18 (09-25-17 @ 06:31) (18 - 19)  SpO2: 97% (09-25-17 @ 06:31) (93% - 97%)      ==================>> PHYSICAL EXAM <<=================    GEN: A&O X 3 , NAD , comfortable  HEENT: NCAT, PERRL, MMM, hearing intact  Neck: supple , no JVD  CVS: S1S2 , regular , No M/R/G appreciated  PULM: CTA B/L,  no W/R/R appreciated  ABD.: soft. non tender, non distended,  bowel sounds present  Extrem: intact pulses , no edema   PSYCH : normal mood,  not anxious     ==================>> LAB AND IMAGING <<==================                        7.4    6.96  )-----------( 296      ( 25 Sep 2017 06:35 )             24.8        Hemoglobin:   7.4  (09-25 @ 06:35)   Hemoglobin:   7.3  (09-24 @ 06:33)   Hemoglobin:   7.5  (09-23 @ 22:15)   Hemoglobin:   8.1  (09-23 @ 14:25)   Hemoglobin:   7.6  (09-23 @ 07:01)     141  |  104  |  25<H>  ----------------------------<  99  4.2   |  26  |  0.89    Ca    8.5      25 Sep 2017 06:35    Surgical Pathology Report (09.19.17 @ 12:58)    Surgical Pathology Report:   ACCESSION No:  [id number]    DURAN SIMS                    1    Surgical Final Report    Final Diagnosis  1-Cardia mass, biopsy:  - Granulation tissue, fat necrosis and necro-inflammatory  exudate.  - No epithelium is present.  - No malignancy identified, see note.    Note: Clinical information of "submucosal mass" is noted.  Multiple levels and immunostains for AE1/AE3 and  Cam 5.2 were examined.    Verified by: Tory Gonzalez M.D.  (Electronic Signature)  Reported on: 09/25/1711:45 EDT,[address] [place] [address]  _________________________________________________________________    Clinical History  ? submucosal mass, EGD    Specimen(s) Submitted  1-Cardia mass  Gross Description  09/19/17 20:50  The specimen is received in formalin and the specimen container  is labeled: Cardia mass.  It consists of at least eight fragments  of soft tan tissue ranging from 0.1 cm to 0.3 cm in greatest  dimension.  Entirely submitted.  One cassette.    In addition toother data that may appear on the specimen  container, the label has been inspected to confirm the presence  of the patient's name and date of birth.  TK 09/19/17 20:51    ___________________________________________________________________________________  ===============>>  A S S E S S M E N T   A N D   P L A N <<===============  ------------------------------------------------------------------------------------------    · Assessment		  61 y/o Male  PMH PUD with hx of bleeding ulcers in the past, presented to ED for generalized weakness, fatigue, exertional SOB. Pt was transfused 5U PRBC during recent admission. Had negative occult blood at that time and capsule endoscopy was decided on for further w/u. Seen by GI and Hematology during recent admission, but refused bone marrow biopsy. H/H 8/18/17 12/7/40/8; H/H upon admission in ED 6.2/20.5.     Problem/Plan - 1:  ·  Problem: GI bleed, likely due to gastric mass found on EGD (no other source of bleeding identified)  GI f/u appreciated  pathology as above  Thoracic surgery f/u for likely eventual surgical resection  monitor H/H closely: transfuse as needed  PPI  iron and MVI    Problem/Plan - 2:  ·  Problem: Anxiety  on Xanax PRN, Effexor  pt reassured     Problem/Plan - 3:  ·  Problem: HTN   Hold Losartan for Now, BP Stable.     Problem/Plan - 4:  ·  Problem: Back pain.  HX of Disc Herniation   on Tramadol PRN at home.     -GI/DVT Prophylaxis: PPI, ambulate    --------------------------------------------  Case discussed with pt,. Gi  Education given on plan of care  ___________________________  H. DORY Santos.  Pager: [phone]

## 2017-09-25 NOTE — DIETITIAN INITIAL EVALUATION ADULT. - OTHER INFO
Pt seen for length of stay. Pt 63 yo male appears alert, oriented. Per Pt his appetite usually good; No chew/swallow problem voiced; no nausea/vomiting/diarrhea reported @ present. Pt also stated his usual body weight: ~247#; no weight loss or weight changes voiced. Pt usually eat Regular food items reported. No other food related concerns voiced, RDN remains available.

## 2017-09-26 LAB
BLD GP AB SCN SERPL QL: NEGATIVE — SIGNIFICANT CHANGE UP
HCT VFR BLD CALC: 24.2 % — LOW (ref 39–50)
HGB BLD-MCNC: 7.2 G/DL — LOW (ref 13–17)
MCHC RBC-ENTMCNC: 24 PG — LOW (ref 27–34)
MCHC RBC-ENTMCNC: 29.8 % — LOW (ref 32–36)
MCV RBC AUTO: 80.7 FL — SIGNIFICANT CHANGE UP (ref 80–100)
NRBC # FLD: 0.06 — SIGNIFICANT CHANGE UP
PLATELET # BLD AUTO: 301 K/UL — SIGNIFICANT CHANGE UP (ref 150–400)
PMV BLD: 10.2 FL — SIGNIFICANT CHANGE UP (ref 7–13)
RBC # BLD: 3 M/UL — LOW (ref 4.2–5.8)
RBC # FLD: 19.4 % — HIGH (ref 10.3–14.5)
RH IG SCN BLD-IMP: NEGATIVE — SIGNIFICANT CHANGE UP
WBC # BLD: 8.41 K/UL — SIGNIFICANT CHANGE UP (ref 3.8–10.5)
WBC # FLD AUTO: 8.41 K/UL — SIGNIFICANT CHANGE UP (ref 3.8–10.5)

## 2017-09-26 RX ORDER — TRAMADOL HYDROCHLORIDE 50 MG/1
50 TABLET ORAL ONCE
Qty: 0 | Refills: 0 | Status: DISCONTINUED | OUTPATIENT
Start: 2017-09-26 | End: 2017-09-26

## 2017-09-26 RX ADMIN — Medication 650 MILLIGRAM(S): at 15:28

## 2017-09-26 RX ADMIN — Medication 1 TABLET(S): at 12:56

## 2017-09-26 RX ADMIN — TRAMADOL HYDROCHLORIDE 50 MILLIGRAM(S): 50 TABLET ORAL at 23:27

## 2017-09-26 RX ADMIN — Medication 0.25 MILLIGRAM(S): at 00:44

## 2017-09-26 RX ADMIN — Medication 325 MILLIGRAM(S): at 09:01

## 2017-09-26 RX ADMIN — PANTOPRAZOLE SODIUM 40 MILLIGRAM(S): 20 TABLET, DELAYED RELEASE ORAL at 17:11

## 2017-09-26 RX ADMIN — IRON SUCROSE 110 MILLIGRAM(S): 20 INJECTION, SOLUTION INTRAVENOUS at 12:55

## 2017-09-26 RX ADMIN — PANTOPRAZOLE SODIUM 40 MILLIGRAM(S): 20 TABLET, DELAYED RELEASE ORAL at 06:42

## 2017-09-26 RX ADMIN — Medication 37.5 MILLIGRAM(S): at 21:52

## 2017-09-26 RX ADMIN — Medication 325 MILLIGRAM(S): at 17:11

## 2017-09-26 RX ADMIN — Medication 650 MILLIGRAM(S): at 16:30

## 2017-09-26 NOTE — PROGRESS NOTE ADULT - SUBJECTIVE AND OBJECTIVE BOX
_________________________________________________________________________________________  ========>>  M E D I C A L   A T T E N D I N G    F O L L O W  U P  N O T E  <<=========  -----------------------------------------------------------------------------------------------------    - Patient seen and examined by me approximately thirty minutes ago.  - In summary, patient is a 62y year old man who originally presented with anemia  - Patient today overall doing ok, comfortable, no SOB, palpitations CP. no bleeding reported, slept well, though feels tired    ==================>> MEDICATIONS <<====================    venlafaxine XR. 37.5 milliGRAM(s) Oral at bedtime  pantoprazole  Injectable 40 milliGRAM(s) IV Push two times a day  influenza   Vaccine 0.5 milliLiter(s) IntraMuscular once  iron sucrose IVPB 200 milliGRAM(s) IV Intermittent daily  multivitamin 1 Tablet(s) Oral daily  ferrous    sulfate 325 milliGRAM(s) Oral two times a day with meals    MEDICATIONS  (PRN):  acetaminophen   Tablet. 650 milliGRAM(s) Oral every 6 hours PRN Mild Pain (1 - 3)  ALPRAZolam 0.25 milliGRAM(s) Oral at bedtime PRN Anxiety    ==================>> REVIEW OF SYSTEM <<=================    GEN: no fever, no chills, no pain  RESP: no SOB, no cough, no sputum  CVS: no chest pain, no palpitations, no edema  GI: no abdominal pain, no nausea, no constipation, no diarrhea, no bleeding noted  : no dysuria, no frequency, no hematuria  Neuro: no headache, + occasional mild lightheadedness, improved  Derm : no itching, no rash    ==================>> VITAL SIGNS <<==================    Vital Signs Last 24 Hrs  T(C): 36.8 (09-26-17 @ 06:41)  T(F): 98.3 (09-26-17 @ 06:41), Max: 98.3 (09-26-17 @ 06:41)  HR: 63 (09-26-17 @ 06:41) (63 - 70)  BP: 107/55 (09-26-17 @ 06:41)  BP(mean): --  RR: 18 (09-26-17 @ 06:41) (18 - 18)  SpO2: 97% (09-26-17 @ 06:41) (97% - 98%)      ==================>> PHYSICAL EXAM <<=================    GEN: A&O X 3 , NAD , comfortable  HEENT: NCAT, PERRL, MMM, hearing intact  Neck: supple , no JVD  CVS: S1S2 , regular , No M/R/G appreciated  PULM: CTA B/L,  no W/R/R appreciated  ABD.: soft. non tender, non distended,  bowel sounds present  Extrem: intact pulses , no edema   PSYCH : normal mood,  not anxious     ==================>> LAB AND IMAGING <<==================                                         7.2    8.41  )-----------( 301      ( 26 Sep 2017 06:45 )             24.2   Hemoglobin:   7.2  (09-26 @ 06:45)   Hemoglobin:   7.4  (09-25 @ 06:35)   Hemoglobin:   7.3  (09-24 @ 06:33)   Hemoglobin:   7.5  (09-23 @ 22:15)   Hemoglobin:   8.1  (09-23 @ 14:25)     141  |  104  |  25<H>  ----------------------------<  99  4.2   |  26  |  0.89    Ca    8.5      25 Sep 2017 06:35      Surgical Pathology Report (09.19.17 @ 12:58)    Surgical Pathology Report:   ACCESSION No:  [id number]    DURAN SIMS W                    1    Surgical Final Report    Final Diagnosis  1-Cardia mass, biopsy:  - Granulation tissue, fat necrosis and necro-inflammatory  exudate.  - No epithelium is present.  - No malignancy identified, see note.    Note: Clinical information of "submucosal mass" is noted.  Multiple levels and immunostains for AE1/AE3 and  Cam 5.2 were examined.    Verified by: Tory Gonzalez M.D.  (Electronic Signature)  Reported on: 09/25/1711:45 EDT,[address] [place] [address]  _________________________________________________________________    Clinical History  ? submucosal mass, EGD    Specimen(s) Submitted  1-Cardia mass  Gross Description  09/19/17 20:50  The specimen is received in formalin and the specimen container  is labeled: Cardia mass.  It consists of at least eight fragments  of soft tan tissue ranging from 0.1 cm to 0.3 cm in greatest  dimension.  Entirely submitted.  One cassette.    In addition toother data that may appear on the specimen  container, the label has been inspected to confirm the presence  of the patient's name and date of birth.  TK 09/19/17 20:51    ___________________________________________________________________________________  ===============>>  A S S E S S M E N T   A N D   P L A N <<===============  ------------------------------------------------------------------------------------------    · Assessment		  61 y/o Male  PMH PUD with hx of bleeding ulcers in the past, presented to ED for generalized weakness, fatigue, exertional SOB. Pt was transfused 5U PRBC during recent admission. Had negative occult blood at that time and capsule endoscopy was decided on for further w/u. Seen by GI and Hematology during recent admission, but refused bone marrow biopsy. H/H 8/18/17 12/7/40/8; H/H upon admission in ED 6.2/20.5.     Problem/Plan - 1:  ·  Problem: GI bleed, likely due to gastric mass found on EGD (no other source of bleeding identified)  GI f/u appreciated  pathology as above  awaiting follow up re resection (possibly endoscopically)  monitor H/H closely: transfuse one unite today in prep  for possible intervention tomorrow?  PPI  iron and MVI    Problem/Plan - 2:  ·  Problem: Anxiety  on Xanax PRN, Effexor  pt reassured     Problem/Plan - 3:  ·  Problem: HTN   Hold Losartan for Now, BP Stable.     Problem/Plan - 4:  ·  Problem: Back pain.  HX of Disc Herniation   on Tramadol PRN at home.     -GI/DVT Prophylaxis: PPI, ambulate    --------------------------------------------  Case discussed with pt,. Gi  Education given on plan of care  ___________________________  H. DORY Santos.  Pager: [phone]

## 2017-09-26 NOTE — PROGRESS NOTE ADULT - PROBLEM SELECTOR PLAN 1
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 Meyer, who thinks he can perform endoscopic submucosal dissection

## 2017-09-26 NOTE — PROGRESS NOTE ADULT - SUBJECTIVE AND OBJECTIVE BOX
INTERVAL HPI/OVERNIGHT EVENTS:    pt reports no abdominal pain, nausea or vomiting  +brown bm, no melena no brbpr    MEDICATIONS  (STANDING):  venlafaxine XR. 37.5 milliGRAM(s) Oral at bedtime  pantoprazole  Injectable 40 milliGRAM(s) IV Push two times a day  influenza   Vaccine 0.5 milliLiter(s) IntraMuscular once  multivitamin 1 Tablet(s) Oral daily  ferrous    sulfate 325 milliGRAM(s) Oral two times a day with meals    MEDICATIONS  (PRN):  acetaminophen   Tablet. 650 milliGRAM(s) Oral every 6 hours PRN Mild Pain (1 - 3)  ALPRAZolam 0.25 milliGRAM(s) Oral at bedtime PRN Anxiety      Allergies    No Known Allergies    Intolerances        Review of Systems:    General:  No wt loss, fevers, chills, night sweats, fatigue   Eyes:  Good vision, no reported pain  ENT:  No sore throat, pain, runny nose, dysphagia  CV:  No pain, palpitations, hypo/hypertension  Resp:  No dyspnea, cough, tachypnea, wheezing  GI:  No pain, No nausea, No vomiting, No diarrhea, No constipation, No weight loss, No fever, No pruritis, No rectal bleeding, No melena, No dysphagia  :  No pain, bleeding, incontinence, nocturia  Muscle:  No pain, weakness  Neuro:  No weakness, tingling, memory problems  Psych:  No fatigue, insomnia, mood problems, depression  Endocrine:  No polyuria, polydypsia, cold/heat intolerance  Heme:  No petechiae, ecchymosis, easy bruisability  Skin:  No rash, tattoos, scars, edema      Vital Signs Last 24 Hrs  T(C): 36.8 (26 Sep 2017 06:41), Max: 36.8 (26 Sep 2017 06:41)  T(F): 98.3 (26 Sep 2017 06:41), Max: 98.3 (26 Sep 2017 06:41)  HR: 63 (26 Sep 2017 06:41) (63 - 70)  BP: 107/55 (26 Sep 2017 06:41) (107/55 - 139/66)  BP(mean): --  RR: 18 (26 Sep 2017 06:41) (18 - 18)  SpO2: 97% (26 Sep 2017 06:41) (97% - 98%)    PHYSICAL EXAM:    Constitutional: NAD  HEENT: EOMI, throat clear  Neck: No LAD, supple  Respiratory: CTA and P  Cardiovascular: S1 and S2, RRR, no M  Gastrointestinal: BS+, soft, NT/ND, neg HSM,  Extremities: No peripheral edema, neg clubbing, cyanosis  Vascular: 2+ peripheral pulses  Neurological: A/O x 3, no focal deficits  Psychiatric: Normal mood, normal affect  Skin: No rashes      LABS:                        7.2    8.41  )-----------( 301      ( 26 Sep 2017 06:45 )             24.2     09-25    141  |  104  |  25<H>  ----------------------------<  99  4.2   |  26  |  0.89    Ca    8.5      25 Sep 2017 06:35            RADIOLOGY & ADDITIONAL TESTS:

## 2017-09-27 LAB
HCT VFR BLD CALC: 27.8 % — LOW (ref 39–50)
HGB BLD-MCNC: 8.3 G/DL — LOW (ref 13–17)
MCHC RBC-ENTMCNC: 24.1 PG — LOW (ref 27–34)
MCHC RBC-ENTMCNC: 29.9 % — LOW (ref 32–36)
MCV RBC AUTO: 80.8 FL — SIGNIFICANT CHANGE UP (ref 80–100)
NRBC # FLD: 0.22 — SIGNIFICANT CHANGE UP
NRBC FLD-RTO: 1.9 — SIGNIFICANT CHANGE UP
PLATELET # BLD AUTO: 293 K/UL — SIGNIFICANT CHANGE UP (ref 150–400)
PMV BLD: 9.6 FL — SIGNIFICANT CHANGE UP (ref 7–13)
RBC # BLD: 3.44 M/UL — LOW (ref 4.2–5.8)
RBC # FLD: 20 % — HIGH (ref 10.3–14.5)
WBC # BLD: 11.84 K/UL — HIGH (ref 3.8–10.5)
WBC # FLD AUTO: 11.84 K/UL — HIGH (ref 3.8–10.5)

## 2017-09-27 PROCEDURE — 99254 IP/OBS CNSLTJ NEW/EST MOD 60: CPT | Mod: GC

## 2017-09-27 RX ORDER — TRAMADOL HYDROCHLORIDE 50 MG/1
50 TABLET ORAL ONCE
Qty: 0 | Refills: 0 | Status: DISCONTINUED | OUTPATIENT
Start: 2017-09-27 | End: 2017-09-27

## 2017-09-27 RX ADMIN — TRAMADOL HYDROCHLORIDE 50 MILLIGRAM(S): 50 TABLET ORAL at 00:27

## 2017-09-27 RX ADMIN — PANTOPRAZOLE SODIUM 40 MILLIGRAM(S): 20 TABLET, DELAYED RELEASE ORAL at 06:54

## 2017-09-27 RX ADMIN — Medication 650 MILLIGRAM(S): at 16:53

## 2017-09-27 RX ADMIN — Medication 0.25 MILLIGRAM(S): at 01:07

## 2017-09-27 RX ADMIN — Medication 325 MILLIGRAM(S): at 16:54

## 2017-09-27 RX ADMIN — Medication 1 TABLET(S): at 12:47

## 2017-09-27 RX ADMIN — Medication 37.5 MILLIGRAM(S): at 21:25

## 2017-09-27 RX ADMIN — Medication 650 MILLIGRAM(S): at 17:56

## 2017-09-27 RX ADMIN — Medication 325 MILLIGRAM(S): at 12:46

## 2017-09-27 RX ADMIN — TRAMADOL HYDROCHLORIDE 50 MILLIGRAM(S): 50 TABLET ORAL at 23:25

## 2017-09-27 RX ADMIN — PANTOPRAZOLE SODIUM 40 MILLIGRAM(S): 20 TABLET, DELAYED RELEASE ORAL at 16:54

## 2017-09-27 RX ADMIN — TRAMADOL HYDROCHLORIDE 50 MILLIGRAM(S): 50 TABLET ORAL at 23:50

## 2017-09-27 NOTE — PROGRESS NOTE ADULT - PROBLEM SELECTOR PLAN 1
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 Meyer, who thinks he can perform endoscopic submucosal dissection; will fu for timing

## 2017-09-27 NOTE — PROGRESS NOTE ADULT - SUBJECTIVE AND OBJECTIVE BOX
INTERVAL HPI/OVERNIGHT EVENTS:    denies n/v/d/c, abdominal pain, melena or brbpr   tolerating po intake well    MEDICATIONS  (STANDING):  venlafaxine XR. 37.5 milliGRAM(s) Oral at bedtime  pantoprazole  Injectable 40 milliGRAM(s) IV Push two times a day  influenza   Vaccine 0.5 milliLiter(s) IntraMuscular once  multivitamin 1 Tablet(s) Oral daily  ferrous    sulfate 325 milliGRAM(s) Oral two times a day with meals    MEDICATIONS  (PRN):  acetaminophen   Tablet. 650 milliGRAM(s) Oral every 6 hours PRN Mild Pain (1 - 3)  ALPRAZolam 0.25 milliGRAM(s) Oral at bedtime PRN Anxiety      Allergies    No Known Allergies    Intolerances        Review of Systems:    General:  No wt loss, fevers, chills, night sweats, fatigue   Eyes:  Good vision, no reported pain  ENT:  No sore throat, pain, runny nose, dysphagia  CV:  No pain, palpitations, hypo/hypertension  Resp:  No dyspnea, cough, tachypnea, wheezing  GI:  No pain, No nausea, No vomiting, No diarrhea, No constipation, No weight loss, No fever, No pruritis, No rectal bleeding, No melena, No dysphagia  :  No pain, bleeding, incontinence, nocturia  Muscle:  No pain, weakness  Neuro:  No weakness, tingling, memory problems  Psych:  No fatigue, insomnia, mood problems, depression  Endocrine:  No polyuria, polydypsia, cold/heat intolerance  Heme:  No petechiae, ecchymosis, easy bruisability  Skin:  No rash, tattoos, scars, edema      Vital Signs Last 24 Hrs  T(C): 36.7 (27 Sep 2017 06:52), Max: 36.7 (26 Sep 2017 14:42)  T(F): 98 (27 Sep 2017 06:52), Max: 98 (26 Sep 2017 14:42)  HR: 65 (27 Sep 2017 06:52) (65 - 74)  BP: 139/72 (27 Sep 2017 06:52) (116/59 - 139/72)  BP(mean): --  RR: 18 (27 Sep 2017 06:52) (18 - 18)  SpO2: 100% (27 Sep 2017 06:52) (98% - 100%)    PHYSICAL EXAM:    Constitutional: NAD  HEENT: EOMI, throat clear  Neck: No LAD, supple  Respiratory: CTA and P  Cardiovascular: S1 and S2, RRR, no M  Gastrointestinal: BS+, soft, NT/ND, neg HSM,  Extremities: No peripheral edema, neg clubbing, cyanosis  Vascular: 2+ peripheral pulses  Neurological: A/O x 3, no focal deficits  Psychiatric: Normal mood, normal affect  Skin: No rashes      LABS:                        8.3    11.84 )-----------( 293      ( 27 Sep 2017 07:00 )             27.8                 RADIOLOGY & ADDITIONAL TESTS:

## 2017-09-27 NOTE — PROGRESS NOTE ADULT - SUBJECTIVE AND OBJECTIVE BOX
_________________________________________________________________________________________  ========>>  M E D I C A L   A T T E N D I N G    F O L L O W  U P  N O T E  <<=========  -----------------------------------------------------------------------------------------------------    - Patient seen and examined by me approximately thirty minutes ago.  - In summary, patient is a 62y year old man who originally presented with anemia  - Patient today overall doing ok, comfortable, no SOB, palpitations CP. no bleeding reported, slept well, though feels better    ==================>> MEDICATIONS <<====================    venlafaxine XR. 37.5 milliGRAM(s) Oral at bedtime  pantoprazole  Injectable 40 milliGRAM(s) IV Push two times a day  influenza   Vaccine 0.5 milliLiter(s) IntraMuscular once  multivitamin 1 Tablet(s) Oral daily  ferrous    sulfate 325 milliGRAM(s) Oral two times a day with meals    MEDICATIONS  (PRN):  acetaminophen   Tablet. 650 milliGRAM(s) Oral every 6 hours PRN Mild Pain (1 - 3)  ALPRAZolam 0.25 milliGRAM(s) Oral at bedtime PRN Anxiety    ==================>> REVIEW OF SYSTEM <<=================    GEN: no fever, no chills, no pain  RESP: no SOB, no cough, no sputum  CVS: no chest pain, no palpitations, no edema  GI: no abdominal pain, no nausea, no constipation, no diarrhea, no bleeding noted  : no dysuria, no frequency, no hematuria  Neuro: no headache, + occasional mild lightheadedness, improved  Derm : no itching, no rash    ==================>> VITAL SIGNS <<==================    Vital Signs Last 24 Hrs  T(C): 36.9 (09-27-17 @ 15:00)  T(F): 98.5 (09-27-17 @ 15:00), Max: 98.5 (09-27-17 @ 15:00)  HR: 76 (09-27-17 @ 15:00) (65 - 76)  BP: 121/55 (09-27-17 @ 15:00)  BP(mean): --  RR: 18 (09-27-17 @ 15:00) (18 - 18)  SpO2: 98% (09-27-17 @ 15:00) (98% - 100%)      ==================>> PHYSICAL EXAM <<=================    GEN: A&O X 3 , NAD , comfortable  HEENT: NCAT, PERRL, MMM, hearing intact  Neck: supple , no JVD  CVS: S1S2 , regular , No M/R/G appreciated  PULM: CTA B/L,  no W/R/R appreciated  ABD.: soft. non tender, non distended,  bowel sounds present  Extrem: intact pulses , no edema   PSYCH : normal mood,  not anxious     ==================>> LAB AND IMAGING <<==================                                 8.3    11.84 )-----------( 293      ( 27 Sep 2017 07:00 )             27.8        Hemoglobin:   8.3  (09-27 @ 07:00)   Hemoglobin:   7.2  (09-26 @ 06:45)   Hemoglobin:   7.4  (09-25 @ 06:35)   Hemoglobin:   7.3  (09-24 @ 06:33)   Hemoglobin:   7.5  (09-23 @ 22:15)     Surgical Pathology Report (09.19.17 @ 12:58)    Surgical Pathology Report:   ACCESSION No:  [id number]    DURAN SIMS                    1    Surgical Final Report    Final Diagnosis  1-Cardia mass, biopsy:  - Granulation tissue, fat necrosis and necro-inflammatory  exudate.  - No epithelium is present.  - No malignancy identified, see note.    Note: Clinical information of "submucosal mass" is noted.  Multiple levels and immunostains for AE1/AE3 and  Cam 5.2 were examined.    Verified by: Tory Gonzalez M.D.  (Electronic Signature)  Reported on: 09/25/1711:45 EDT,[address] [place] [address]  _________________________________________________________________    Clinical History  ? submucosal mass, EGD    Specimen(s) Submitted  1-Cardia mass  Gross Description  09/19/17 20:50  The specimen is received in formalin and the specimen container  is labeled: Cardia mass.  It consists of at least eight fragments  of soft tan tissue ranging from 0.1 cm to 0.3 cm in greatest  dimension.  Entirely submitted.  One cassette.    In addition toother data that may appear on the specimen  container, the label has been inspected to confirm the presence  of the patient's name and date of birth.  TK 09/19/17 20:51    ___________________________________________________________________________________  ===============>>  A S S E S S M E N T   A N D   P L A N <<===============  ------------------------------------------------------------------------------------------    · Assessment		  61 y/o Male  PMH PUD with hx of bleeding ulcers in the past, presented to ED for generalized weakness, fatigue, exertional SOB. Pt was transfused 5U PRBC during recent admission. Had negative occult blood at that time and capsule endoscopy was decided on for further w/u. Seen by GI and Hematology during recent admission, but refused bone marrow biopsy. H/H 8/18/17 12/7/40/8; H/H upon admission in ED 6.2/20.5.     Problem/Plan - 1:  ·  Problem: GI bleed, likely due to gastric mass found on EGD (no other source of bleeding identified)  GI f/u appreciated  pathology as above  awaiting follow up re endoscopic resection   monitor H/H closely: better post PRBC  PPI  iron and MVI    Problem/Plan - 2:  ·  Problem: Anxiety  on Xanax PRN, Effexor  pt reassured     Problem/Plan - 3:  ·  Problem: HTN   Hold Losartan for Now, BP Stable.     Problem/Plan - 4:  ·  Problem: Back pain.  HX of Disc Herniation   on Tramadol PRN at home.     -GI/DVT Prophylaxis: PPI, ambulate    --------------------------------------------  Case discussed with pt,. Gi  Education given on plan of care  ___________________________  H. DORY Santos.  Pager: [phone]

## 2017-09-28 ENCOUNTER — CHART COPY (OUTPATIENT)
Age: 62
End: 2017-09-28

## 2017-09-28 ENCOUNTER — RESULT REVIEW (OUTPATIENT)
Age: 62
End: 2017-09-28

## 2017-09-28 LAB
APTT BLD: 30.5 SEC — SIGNIFICANT CHANGE UP (ref 27.5–37.4)
BUN SERPL-MCNC: 17 MG/DL — SIGNIFICANT CHANGE UP (ref 7–23)
CALCIUM SERPL-MCNC: 8.4 MG/DL — SIGNIFICANT CHANGE UP (ref 8.4–10.5)
CHLORIDE SERPL-SCNC: 102 MMOL/L — SIGNIFICANT CHANGE UP (ref 98–107)
CO2 SERPL-SCNC: 26 MMOL/L — SIGNIFICANT CHANGE UP (ref 22–31)
CREAT SERPL-MCNC: 0.74 MG/DL — SIGNIFICANT CHANGE UP (ref 0.5–1.3)
GLUCOSE SERPL-MCNC: 93 MG/DL — SIGNIFICANT CHANGE UP (ref 70–99)
HCT VFR BLD CALC: 28.9 % — LOW (ref 39–50)
HGB BLD-MCNC: 8.5 G/DL — LOW (ref 13–17)
INR BLD: 1.09 — SIGNIFICANT CHANGE UP (ref 0.88–1.17)
MAGNESIUM SERPL-MCNC: 1.8 MG/DL — SIGNIFICANT CHANGE UP (ref 1.6–2.6)
MCHC RBC-ENTMCNC: 24.6 PG — LOW (ref 27–34)
MCHC RBC-ENTMCNC: 29.4 % — LOW (ref 32–36)
MCV RBC AUTO: 83.5 FL — SIGNIFICANT CHANGE UP (ref 80–100)
NRBC # FLD: 0.05 — SIGNIFICANT CHANGE UP
PHOSPHATE SERPL-MCNC: 3.9 MG/DL — SIGNIFICANT CHANGE UP (ref 2.5–4.5)
PLATELET # BLD AUTO: 299 K/UL — SIGNIFICANT CHANGE UP (ref 150–400)
PMV BLD: 10.3 FL — SIGNIFICANT CHANGE UP (ref 7–13)
POTASSIUM SERPL-MCNC: 4.4 MMOL/L — SIGNIFICANT CHANGE UP (ref 3.5–5.3)
POTASSIUM SERPL-SCNC: 4.4 MMOL/L — SIGNIFICANT CHANGE UP (ref 3.5–5.3)
PROTHROM AB SERPL-ACNC: 12.2 SEC — SIGNIFICANT CHANGE UP (ref 9.8–13.1)
RBC # BLD: 3.46 M/UL — LOW (ref 4.2–5.8)
RBC # FLD: 22.6 % — HIGH (ref 10.3–14.5)
SODIUM SERPL-SCNC: 138 MMOL/L — SIGNIFICANT CHANGE UP (ref 135–145)
WBC # BLD: 11.56 K/UL — HIGH (ref 3.8–10.5)
WBC # FLD AUTO: 11.56 K/UL — HIGH (ref 3.8–10.5)

## 2017-09-28 PROCEDURE — 88307 TISSUE EXAM BY PATHOLOGIST: CPT | Mod: 26

## 2017-09-28 PROCEDURE — 88341 IMHCHEM/IMCYTCHM EA ADD ANTB: CPT | Mod: 26

## 2017-09-28 PROCEDURE — 88342 IMHCHEM/IMCYTCHM 1ST ANTB: CPT | Mod: 26

## 2017-09-28 PROCEDURE — 43254 EGD ENDO MUCOSAL RESECTION: CPT

## 2017-09-28 RX ORDER — TRAMADOL HYDROCHLORIDE 50 MG/1
50 TABLET ORAL ONCE
Qty: 0 | Refills: 0 | Status: DISCONTINUED | OUTPATIENT
Start: 2017-09-28 | End: 2017-09-28

## 2017-09-28 RX ORDER — CIPROFLOXACIN LACTATE 400MG/40ML
500 VIAL (ML) INTRAVENOUS EVERY 12 HOURS
Qty: 0 | Refills: 0 | Status: DISCONTINUED | OUTPATIENT
Start: 2017-09-28 | End: 2017-10-01

## 2017-09-28 RX ORDER — METRONIDAZOLE 500 MG
500 TABLET ORAL EVERY 8 HOURS
Qty: 0 | Refills: 0 | Status: DISCONTINUED | OUTPATIENT
Start: 2017-09-28 | End: 2017-10-01

## 2017-09-28 RX ADMIN — Medication 650 MILLIGRAM(S): at 11:57

## 2017-09-28 RX ADMIN — Medication 500 MILLIGRAM(S): at 22:25

## 2017-09-28 RX ADMIN — TRAMADOL HYDROCHLORIDE 50 MILLIGRAM(S): 50 TABLET ORAL at 22:50

## 2017-09-28 RX ADMIN — Medication 650 MILLIGRAM(S): at 12:55

## 2017-09-28 RX ADMIN — TRAMADOL HYDROCHLORIDE 50 MILLIGRAM(S): 50 TABLET ORAL at 22:25

## 2017-09-28 RX ADMIN — Medication 37.5 MILLIGRAM(S): at 22:25

## 2017-09-28 RX ADMIN — Medication 0.25 MILLIGRAM(S): at 00:03

## 2017-09-28 NOTE — PROGRESS NOTE ADULT - SUBJECTIVE AND OBJECTIVE BOX
_________________________________________________________________________________________  ========>>  M E D I C A L   A T T E N D I N G    F O L L O W  U P  N O T E  <<=========  -----------------------------------------------------------------------------------------------------    - Patient seen and examined by me approximately thirty minutes ago.  - In summary, patient is a 62y year old man who originally presented with anemia  - Patient today overall doing ok, comfortable, no SOB, palpitations CP. no bleeding reported, slept well, though feels better    ==================>> MEDICATIONS <<====================    venlafaxine XR. 37.5 milliGRAM(s) Oral at bedtime  pantoprazole  Injectable 40 milliGRAM(s) IV Push two times a day  influenza   Vaccine 0.5 milliLiter(s) IntraMuscular once  multivitamin 1 Tablet(s) Oral daily  ferrous    sulfate 325 milliGRAM(s) Oral two times a day with meals    MEDICATIONS  (PRN):  acetaminophen   Tablet. 650 milliGRAM(s) Oral every 6 hours PRN Mild Pain (1 - 3)  ALPRAZolam 0.25 milliGRAM(s) Oral at bedtime PRN Anxiety    ==================>> REVIEW OF SYSTEM <<=================    GEN: no fever, no chills, no pain  RESP: no SOB, no cough, no sputum  CVS: no chest pain, no palpitations, no edema  GI: no abdominal pain, no nausea, no constipation, no diarrhea, no bleeding noted, Brown BMs  : no dysuria, no frequency, no hematuria  Neuro: no headache, + occasional mild lightheadedness, improved  Derm : no itching, no rash    ==================>> VITAL SIGNS <<==================    Vital Signs Last 24 Hrs  T(C): 37.2 (09-28-17 @ 06:40)  T(F): 99 (09-28-17 @ 06:40), Max: 99.2 (09-27-17 @ 21:24)  HR: 64 (09-28-17 @ 06:40) (64 - 78)  BP: 110/70 (09-28-17 @ 06:40)  BP(mean): --  RR: 18 (09-28-17 @ 06:40) (18 - 18)  SpO2: 97% (09-28-17 @ 06:40) (97% - 100%)      ==================>> PHYSICAL EXAM <<=================    GEN: A&O X 3 , NAD , comfortable  HEENT: NCAT, PERRL, MMM, hearing intact  Neck: supple , no JVD  CVS: S1S2 , regular , No M/R/G appreciated  PULM: CTA B/L,  no W/R/R appreciated  ABD.: soft. non tender, non distended,  bowel sounds present  Extrem: intact pulses , no edema   PSYCH : normal mood,  not anxious     ==================>> LAB AND IMAGING <<==================                                             8.5    11.56 )-----------( 299      ( 28 Sep 2017 10:20 )             28.9        Hemoglobin:   8.5  (09-28 @ 10:20)   Hemoglobin:   8.3  (09-27 @ 07:00)   Hemoglobin:   7.2  (09-26 @ 06:45)   Hemoglobin:   7.4  (09-25 @ 06:35)   Hemoglobin:   7.3  (09-24 @ 06:33)     138  |  102  |  17  ----------------------------<  93  4.4   |  26  |  0.74    Ca    8.4      28 Sep 2017 10:20  Phos  3.9     09-28  Mg     1.8     09-28    ___________________________________________________________________________________  ===============>>  A S S E S S M E N T   A N D   P L A N <<===============  ------------------------------------------------------------------------------------------    Problem/Plan - 1:  ·  Problem: GI bleed, likely due to gastric mass found on EGD (no other source of bleeding identified)  GI f/u appreciated  for endoscopic resection today  monitor H/H closely: better post PRBC  PPI  iron and MVI    Problem/Plan - 2:  ·  Problem: Anxiety  on Xanax PRN, Effexor  pt reassured     Problem/Plan - 3:  ·  Problem: HTN   Hold Losartan for Now, BP Stable.     Problem/Plan - 4:  ·  Problem: Back pain.  HX of Disc Herniation   on Tramadol PRN at home.     -GI/DVT Prophylaxis: PPI, ambulate    --------------------------------------------  Case discussed with pt,. Gi  Education given on plan of care  ___________________________  H. DORY Santos.  Pager: [phone]

## 2017-09-28 NOTE — PROGRESS NOTE ADULT - SUBJECTIVE AND OBJECTIVE BOX
INTERVAL HPI/OVERNIGHT EVENTS:    denies n/v/d/c, abdominal pain, melena or brbpr   frustrated over when the procedure will be    MEDICATIONS  (STANDING):  venlafaxine XR. 37.5 milliGRAM(s) Oral at bedtime  pantoprazole  Injectable 40 milliGRAM(s) IV Push two times a day  influenza   Vaccine 0.5 milliLiter(s) IntraMuscular once  multivitamin 1 Tablet(s) Oral daily  ferrous    sulfate 325 milliGRAM(s) Oral two times a day with meals    MEDICATIONS  (PRN):  acetaminophen   Tablet. 650 milliGRAM(s) Oral every 6 hours PRN Mild Pain (1 - 3)  ALPRAZolam 0.25 milliGRAM(s) Oral at bedtime PRN Anxiety      Allergies    No Known Allergies    Intolerances        Review of Systems:    General:  No wt loss, fevers, chills, night sweats, fatigue   Eyes:  Good vision, no reported pain  ENT:  No sore throat, pain, runny nose, dysphagia  CV:  No pain, palpitations, hypo/hypertension  Resp:  No dyspnea, cough, tachypnea, wheezing  GI:  No pain, No nausea, No vomiting, No diarrhea, No constipation, No weight loss, No fever, No pruritis, No rectal bleeding, No melena, No dysphagia  :  No pain, bleeding, incontinence, nocturia  Muscle:  No pain, weakness  Neuro:  No weakness, tingling, memory problems  Psych:  No fatigue, insomnia, mood problems, depression  Endocrine:  No polyuria, polydypsia, cold/heat intolerance  Heme:  No petechiae, ecchymosis, easy bruisability  Skin:  No rash, tattoos, scars, edema      Vital Signs Last 24 Hrs  T(C): 37.2 (28 Sep 2017 06:40), Max: 37.3 (27 Sep 2017 21:24)  T(F): 99 (28 Sep 2017 06:40), Max: 99.2 (27 Sep 2017 21:24)  HR: 64 (28 Sep 2017 06:40) (64 - 78)  BP: 110/70 (28 Sep 2017 06:40) (110/70 - 121/55)  BP(mean): --  RR: 18 (28 Sep 2017 06:40) (18 - 18)  SpO2: 97% (28 Sep 2017 06:40) (97% - 100%)    PHYSICAL EXAM:    Constitutional: NAD  HEENT: EOMI, throat clear  Neck: No LAD, supple  Respiratory: CTA and P  Cardiovascular: S1 and S2, RRR, no M  Gastrointestinal: BS+, soft, NT/ND, neg HSM,  Extremities: No peripheral edema, neg clubbing, cyanosis  Vascular: 2+ peripheral pulses  Neurological: A/O x 3, no focal deficits  Psychiatric: Normal mood, normal affect  Skin: No rashes      LABS:                        8.3    11.84 )-----------( 293      ( 27 Sep 2017 07:00 )             27.8                 RADIOLOGY & ADDITIONAL TESTS:

## 2017-09-28 NOTE — CHART NOTE - NSCHARTNOTEFT_GEN_A_CORE
Notified by RN that pt requesting to eat. Spoke with GI fellow (#[id number]) s/p endoscopic submucossal dissection. Advised to keep pt npo except ice chips/medications. Also instructed to start patient on prophylaxis abx (cipro and flagyl).

## 2017-09-29 LAB
BUN SERPL-MCNC: 14 MG/DL — SIGNIFICANT CHANGE UP (ref 7–23)
CALCIUM SERPL-MCNC: 8.6 MG/DL — SIGNIFICANT CHANGE UP (ref 8.4–10.5)
CHLORIDE SERPL-SCNC: 102 MMOL/L — SIGNIFICANT CHANGE UP (ref 98–107)
CO2 SERPL-SCNC: 23 MMOL/L — SIGNIFICANT CHANGE UP (ref 22–31)
CREAT SERPL-MCNC: 0.79 MG/DL — SIGNIFICANT CHANGE UP (ref 0.5–1.3)
GLUCOSE SERPL-MCNC: 89 MG/DL — SIGNIFICANT CHANGE UP (ref 70–99)
HCT VFR BLD CALC: 29 % — LOW (ref 39–50)
HGB BLD-MCNC: 8.6 G/DL — LOW (ref 13–17)
MCHC RBC-ENTMCNC: 24.6 PG — LOW (ref 27–34)
MCHC RBC-ENTMCNC: 29.7 % — LOW (ref 32–36)
MCV RBC AUTO: 82.9 FL — SIGNIFICANT CHANGE UP (ref 80–100)
NRBC # FLD: 0.03 — SIGNIFICANT CHANGE UP
PLATELET # BLD AUTO: 272 K/UL — SIGNIFICANT CHANGE UP (ref 150–400)
PMV BLD: 9.5 FL — SIGNIFICANT CHANGE UP (ref 7–13)
POTASSIUM SERPL-MCNC: 4.2 MMOL/L — SIGNIFICANT CHANGE UP (ref 3.5–5.3)
POTASSIUM SERPL-SCNC: 4.2 MMOL/L — SIGNIFICANT CHANGE UP (ref 3.5–5.3)
RBC # BLD: 3.5 M/UL — LOW (ref 4.2–5.8)
RBC # FLD: 23.5 % — HIGH (ref 10.3–14.5)
SODIUM SERPL-SCNC: 139 MMOL/L — SIGNIFICANT CHANGE UP (ref 135–145)
WBC # BLD: 12.61 K/UL — HIGH (ref 3.8–10.5)
WBC # FLD AUTO: 12.61 K/UL — HIGH (ref 3.8–10.5)

## 2017-09-29 RX ADMIN — PANTOPRAZOLE SODIUM 40 MILLIGRAM(S): 20 TABLET, DELAYED RELEASE ORAL at 17:46

## 2017-09-29 RX ADMIN — Medication 37.5 MILLIGRAM(S): at 21:52

## 2017-09-29 RX ADMIN — Medication 650 MILLIGRAM(S): at 06:52

## 2017-09-29 RX ADMIN — Medication 1 TABLET(S): at 14:33

## 2017-09-29 RX ADMIN — Medication 650 MILLIGRAM(S): at 16:00

## 2017-09-29 RX ADMIN — Medication 500 MILLIGRAM(S): at 06:52

## 2017-09-29 RX ADMIN — Medication 325 MILLIGRAM(S): at 17:46

## 2017-09-29 RX ADMIN — Medication 500 MILLIGRAM(S): at 17:46

## 2017-09-29 RX ADMIN — Medication 500 MILLIGRAM(S): at 21:52

## 2017-09-29 RX ADMIN — PANTOPRAZOLE SODIUM 40 MILLIGRAM(S): 20 TABLET, DELAYED RELEASE ORAL at 06:52

## 2017-09-29 RX ADMIN — Medication 650 MILLIGRAM(S): at 07:50

## 2017-09-29 RX ADMIN — Medication 650 MILLIGRAM(S): at 15:03

## 2017-09-29 RX ADMIN — Medication 500 MILLIGRAM(S): at 14:33

## 2017-09-29 NOTE — PROGRESS NOTE ADULT - SUBJECTIVE AND OBJECTIVE BOX
Chief Complaint:  Patient is a 62y old  Male who presents with a chief complaint of symptomatic Anemia, GI Bleed, (17 Sep 2017 22:23)      Interval Events: Patient had EGD/EUS with resection of GE junction mass yesterday. 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     Patient feels well this AM. No fevers. No abd pain. No nausea or vomiting. No BMs since procedure.     Allergies:  No Known Allergies      Hospital Medications:  venlafaxine XR. 37.5 milliGRAM(s) Oral at bedtime  pantoprazole  Injectable 40 milliGRAM(s) IV Push two times a day  influenza   Vaccine 0.5 milliLiter(s) IntraMuscular once  acetaminophen   Tablet. 650 milliGRAM(s) Oral every 6 hours PRN  ALPRAZolam 0.25 milliGRAM(s) Oral at bedtime PRN  multivitamin 1 Tablet(s) Oral daily  ferrous    sulfate 325 milliGRAM(s) Oral two times a day with meals  ciprofloxacin     Tablet 500 milliGRAM(s) Oral every 12 hours  metroNIDAZOLE    Tablet 500 milliGRAM(s) Oral every 8 hours      PMHX/PSHX:  Obesity  Back pain  HTN (hypertension)  Gastrointestinal ulcer  Anxiety  Seasonal allergies  Depression  H/O inguinal hernia repair      Family history:  Family history of liver cancer (Father)  Family history of colon cancer (Mother)  Family history of hypertension      ROS:     General:  No wt loss, fevers, chills, night sweats, fatigue   Eyes:  Good vision, no reported pain  ENT:  No sore throat, pain, runny nose  CV:  No chest pain, palpitations  Resp:  No cough, wheezing, SOB  GI:  See HPI  :  No pain, bleeding, incontinence, nocturia  Muscle:  No pain, weakness  Neuro:  No weakness, tingling, memory problems  Psych:  No fatigue, insomnia, mood problems, depression  Endocrine:  No cold/heat intolerance  Heme:  No petechiae, ecchymosis, easy bruising  Skin:  No rash, edema      PHYSICAL EXAM:   Vital Signs:  Vital Signs Last 24 Hrs  T(C): 37.2 (29 Sep 2017 06:48), Max: 37.2 (29 Sep 2017 06:48)  T(F): 99 (29 Sep 2017 06:48), Max: 99 (29 Sep 2017 06:48)  HR: 70 (29 Sep 2017 06:48) (60 - 70)  BP: 116/66 (29 Sep 2017 06:48) (116/66 - 127/69)  BP(mean): --  RR: 18 (29 Sep 2017 06:48) (17 - 18)  SpO2: 99% (29 Sep 2017 06:48) (99% - 100%)  Daily     Daily     GENERAL:  NAD  HEENT:  sclera anicteric  CHEST:  no respiratory distress, CTAB  HEART:  RRR, no MRG, no edema  ABDOMEN:  Soft, non-tender, non-distended, no masses , no hepato-splenomegaly,   EXTREMITIES:  no cyanosis, no edema  SKIN:  No rash  NEURO:  Alert, oriented      LABS:                        8.6    12.61 )-----------( 272      ( 29 Sep 2017 06:30 )             29.0     09-29    139  |  102  |  14  ----------------------------<  89  4.2   |  23  |  0.79    Ca    8.6      29 Sep 2017 06:30  Phos  3.9     09-28  Mg     1.8     09-28        PT/INR - ( 28 Sep 2017 10:20 )   PT: 12.2 SEC;   INR: 1.09          PTT - ( 28 Sep 2017 10:20 )  PTT:30.5 SEC      Imaging:

## 2017-09-29 NOTE — PROGRESS NOTE ADULT - PROBLEM SELECTOR PLAN 1
- s/p endoscopic resection of lipoma  - PPI BID for 2 weeks then daily thereafter  - carafate 1 gm QID  - surgical evaluation for hiatal hernia repair given Patricio's erosions which may bleed in the future   - liquid diet today

## 2017-09-29 NOTE — PROGRESS NOTE ADULT - SUBJECTIVE AND OBJECTIVE BOX
_________________________________________________________________________________________  ========>>  M E D I C A L   A T T E N D I N G    F O L L O W  U P  N O T E  <<=========  -----------------------------------------------------------------------------------------------------    - Patient seen and examined by me approximately thirty minutes ago.  - In summary, patient is a 62y year old man who originally presented with anemia  - Patient today overall doing ok, comfortable, no SOB, palpitations CP. no bleeding reported, a bit tired.    ==================>> MEDICATIONS <<====================    venlafaxine XR. 37.5 milliGRAM(s) Oral at bedtime  pantoprazole  Injectable 40 milliGRAM(s) IV Push two times a day  influenza   Vaccine 0.5 milliLiter(s) IntraMuscular once  multivitamin 1 Tablet(s) Oral daily  ferrous    sulfate 325 milliGRAM(s) Oral two times a day with meals  ciprofloxacin     Tablet 500 milliGRAM(s) Oral every 12 hours  metroNIDAZOLE    Tablet 500 milliGRAM(s) Oral every 8 hours    MEDICATIONS  (PRN):  acetaminophen   Tablet. 650 milliGRAM(s) Oral every 6 hours PRN Mild Pain (1 - 3)  ALPRAZolam 0.25 milliGRAM(s) Oral at bedtime PRN Anxiety    ==================>> REVIEW OF SYSTEM <<=================    GEN: no fever, no chills, no pain  RESP: no SOB, no cough, no sputum  CVS: no chest pain, no palpitations, no edema  GI: no abdominal pain, no nausea, no constipation, no diarrhea, no bleeding   : no dysuria, no frequency, no hematuria  Neuro: no headache, no diziness  Derm : no itching, no rash    ==================>> VITAL SIGNS <<==================    Vital Signs Last 24 Hrs  T(C): 37.2 (09-29-17 @ 06:48)  T(F): 99 (09-29-17 @ 06:48), Max: 99 (09-29-17 @ 06:48)  HR: 70 (09-29-17 @ 06:48) (60 - 70)  BP: 116/66 (09-29-17 @ 06:48)  BP(mean): --  RR: 18 (09-29-17 @ 06:48) (17 - 18)  SpO2: 99% (09-29-17 @ 06:48) (99% - 100%)      ==================>> PHYSICAL EXAM <<=================    GEN: A&O X 3 , NAD , comfortable  HEENT: NCAT, PERRL, MMM, hearing intact  Neck: supple , no JVD  CVS: S1S2 , regular , No M/R/G appreciated  PULM: CTA B/L,  no W/R/R appreciated  ABD.: soft. non tender, non distended,  bowel sounds present  Extrem: intact pulses , no edema       Left forerm and antecubital IV infiltration and swelling / tenderness  PSYCH : normal mood,  not anxious     ==================>> LAB AND IMAGING <<==================                                                8.6    12.61 )-----------( 272      ( 29 Sep 2017 06:30 )             29.0        Hemoglobin:   8.6  (09-29 @ 06:30)   Hemoglobin:   8.5  (09-28 @ 10:20)   Hemoglobin:   8.3  (09-27 @ 07:00)   Hemoglobin:   7.2  (09-26 @ 06:45)   Hemoglobin:   7.4  (09-25 @ 06:35)   	  139  |  102  |  14  ----------------------------<  89  4.2   |  23  |  0.79    Ca    8.6      29 Sep 2017 06:30  Phos  3.9     09-28  Mg     1.8     09-28      Creatinine:  0.79   (09-29 @ 06:30)  Creatinine:  0.74   (09-28 @ 10:20)  PT/INR - ( 28 Sep 2017 10:20 )   PT: 12.2 SEC;   INR: 1.09          PTT - ( 28 Sep 2017 10:20 )  PTT:30.5 SEC                 TSH:      1.60   (09-18-17)           Lipid profile:  (09-18-17)     Total: 116     LDL  : 80     HDL  :26     TG   :71     HgA1C: 6.2  (09-18-17)            < from: Upper Endoscopy (09.28.17 @ 15:46) >  Findings:       A large, submucosal mass with no bleeding and with stigmata of recent        bleeding was found at the gastroesophageal junction.       The mass was noted in the anterior aspect of the GE junction and within        the hernai sac which was measured na 6 cm in length.       The mass was ulcerated and had evidence of recent bleeding. It appeared        to be a non mucosal lesion. EUS was eprformed.       A round intramural (subepithelial) lesion was found in the        gastroesophageal junction. It measured 3-4 cm in diameter and was well        within the submucosal layer (Layer 3) with no evidence of MP involvement.       The lesion was hyperechoic and its EUS apeparance could be consistent        with a lipoma, however given its eprisstentbleeding liekly from        emchanical prolapse, an intact 4th layer, and the endophytic almost        pedunculated appearance the decision was amde to remove it.       In the retroflexed psotion a large snare was able to be placed around        the lesion. The lesion was then resected using a dry cut current (Effect        4 100 cabrera)       It was then obvious that it was a lipoma and the majority was removed in        one pass. A small amount of lipomatous tissue was resected a clean abse     was achieved.       There was no bleeding or perforation.  < end of copied text >    ___________________________________________________________________________________  ===============>>  A S S E S S M E N T   A N D   P L A N <<===============  ------------------------------------------------------------------------------------------    Problem/Plan - 1:  ·  Problem: GI bleed, likely due to gastric mass found on EGD, now post endoscopic resection  GI f/u appreciated  monitor H/H closely: better post PRBC  PPI, iron and MVI  advance diet as able  will eventually need sirgica l mgmt of Hiatal hernia    Problem/Plan - 2:  ·  Problem: IV line infiltration with pain and swelling  no evidence of super-infection  elevation  heat packs and pain mgmt  discussed with pt, RN, NP    Problem/Plan - 3  ·  Problem: Anxiety  on Xanax PRN, Effexor  pt reassured     Problem/Plan - 4  ·  Problem: HTN   Hold Losartan for Now, BP Stable.     Problem/Plan - 5  ·  Problem: Back pain.  HX of Disc Herniation   on Tramadol PRN at home.     -GI/DVT Prophylaxis: PPI, ambulate  -DC planing when cleared by GI    --------------------------------------------  Case discussed with pt,. Gi  Education given on plan of care  ___________________________  H. DORY Santos.  Pager: [phone]

## 2017-09-29 NOTE — PROGRESS NOTE ADULT - SUBJECTIVE AND OBJECTIVE BOX
INTERVAL HPI/OVERNIGHT EVENTS:    pt reports mild soreness in esophagus  some nausea this morning per rn, pt took tylenol on empty stomach   no abd pain or vomiting    MEDICATIONS  (STANDING):  venlafaxine XR. 37.5 milliGRAM(s) Oral at bedtime  pantoprazole  Injectable 40 milliGRAM(s) IV Push two times a day  influenza   Vaccine 0.5 milliLiter(s) IntraMuscular once  multivitamin 1 Tablet(s) Oral daily  ferrous    sulfate 325 milliGRAM(s) Oral two times a day with meals  ciprofloxacin     Tablet 500 milliGRAM(s) Oral every 12 hours  metroNIDAZOLE    Tablet 500 milliGRAM(s) Oral every 8 hours    MEDICATIONS  (PRN):  acetaminophen   Tablet. 650 milliGRAM(s) Oral every 6 hours PRN Mild Pain (1 - 3)  ALPRAZolam 0.25 milliGRAM(s) Oral at bedtime PRN Anxiety      Allergies    No Known Allergies    Intolerances        Review of Systems:    General:  No wt loss, fevers, chills, night sweats, fatigue   Eyes:  Good vision, no reported pain  ENT:  +sore throat, no pain, runny nose, dysphagia  CV:  No pain, palpitations, hypo/hypertension  Resp:  No dyspnea, cough, tachypnea, wheezing  GI:  No pain, +nausea, No vomiting, No diarrhea, No constipation, No weight loss, No fever, No pruritis, No rectal bleeding, No melena, No dysphagia  :  No pain, bleeding, incontinence, nocturia  Muscle:  No pain, weakness  Neuro:  No weakness, tingling, memory problems  Psych:  No fatigue, insomnia, mood problems, depression  Endocrine:  No polyuria, polydypsia, cold/heat intolerance  Heme:  No petechiae, ecchymosis, easy bruisability  Skin:  No rash, tattoos, scars, edema      Vital Signs Last 24 Hrs  T(C): 37.2 (29 Sep 2017 06:48), Max: 37.2 (29 Sep 2017 06:48)  T(F): 99 (29 Sep 2017 06:48), Max: 99 (29 Sep 2017 06:48)  HR: 70 (29 Sep 2017 06:48) (60 - 70)  BP: 116/66 (29 Sep 2017 06:48) (116/66 - 127/69)  BP(mean): --  RR: 18 (29 Sep 2017 06:48) (17 - 18)  SpO2: 99% (29 Sep 2017 06:48) (99% - 100%)    PHYSICAL EXAM:    Constitutional: NAD  HEENT: EOMI, throat clear  Neck: No LAD, supple  Respiratory: CTA and P  Cardiovascular: S1 and S2, RRR, no M  Gastrointestinal: BS+, soft, NT/ND, neg HSM,  Extremities: No peripheral edema, neg clubbing, cyanosis  Vascular: 2+ peripheral pulses  Neurological: A/O x 3, no focal deficits  Psychiatric: Normal mood, normal affect  Skin: No rashes      LABS:                        8.6    12.61 )-----------( 272      ( 29 Sep 2017 06:30 )             29.0     09-29    139  |  102  |  14  ----------------------------<  89  4.2   |  23  |  0.79    Ca    8.6      29 Sep 2017 06:30  Phos  3.9     09-28  Mg     1.8     09-28      PT/INR - ( 28 Sep 2017 10:20 )   PT: 12.2 SEC;   INR: 1.09          PTT - ( 28 Sep 2017 10:20 )  PTT:30.5 SEC      RADIOLOGY & ADDITIONAL TESTS:    < from: Upper Endoscopy (09.28.17 @ 15:46) >    [facility]  _______________________________________________________________________________  Patient Name: Kirt Silverio         Procedure Date: 9/28/2017 3:46 PM  MRN: [id number]                     Account Number: [id number]  YOB: 1955               Admit Type: Inpatient  Room: [place] [address]                         Gender: Male  Attending MD: YUSEF GANNON MD        _______________________________________________________________________________     Procedure:           Upper GI endoscopy  Indications:         Esophageal tumor of uncertain behavior  Providers:           YUSEF GANNON MD  Medicines:           Monitored Anesthesia Care  Complications:       No immediate complications.  Procedure:After obtaining informed consent, the endoscope was                        passed under direct vision. Throughout the procedure,                        the patient's blood pressure, pulse, and oxygen                        saturations were monitored continuously. The Endoscope                        was introduced through the mouth, and advanced to the                        second part of duodenum.                                                                                   Findings:       A large, submucosal mass with no bleeding and with stigmata of recent        bleeding was found at the gastroesophageal junction.       The mass was noted in the anterior aspect of the GE junction and within        the hernai sac which was measured na 6 cm in length.       The mass was ulcerated and had evidence of recent bleeding. It appeared        to be a non mucosal lesion. EUS was eprformed.       A round intramural (subepithelial) lesion was found in the        gastroesophageal junction. It measured 3-4 cm in diameter and was well        within the submucosal layer (Layer 3) with no evidence of MP involvement.       The lesion was hyperechoic and its EUS apeparance could be consistent        with a lipoma, however given its eprisstentbleeding liekly from        emchanical prolapse, an intact 4th layer, and the endophytic almost        pedunculated appearance the decision was amde to remove it.       In the retroflexed psotion a large snare was able to be placed around        the lesion. The lesion was then resected using a dry cut current (Effect        4 100 cabrera)       It was then obvious that it was a lipoma and the majority was removed in        one pass. A small amount of lipomatous tissue was resected a clean abse     was achieved.       There was no bleeding or perforation.                                                                                   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                       - No specimens collected.  Recommendation:      - [facility] care.                                                                                   Attending Participation:       I personally performed the entire procedure.                                                                                     _________________  YUSEF GANNNO MD  9/28/2017 7:28:04 PM  This report has been signed electronically.  Number of Addenda: 0    Note Initiated On: 9/28/2017 3:46 PM    < end of copied text >

## 2017-09-29 NOTE — PROGRESS NOTE ADULT - PROBLEM SELECTOR PLAN 1
- s/p EGD 9/19 with a Submucosal GE junction lesion likely responsible for anemia  - cont ppi bid  - EGD pathology negative for malignant cells  - s/p EUS/FNA 9/20 with path negative for malignant cells, was a reactive lymph node collected from mediastinal mass  - CTSx input appreciated, no intervention this time will fu as outpatient  - s/p endoscopic resection of lipoma with [facility], 9/28; care appreciated  - may advance to [facility] this morning per [facility]

## 2017-09-30 DIAGNOSIS — D64.9 ANEMIA, UNSPECIFIED: ICD-10-CM

## 2017-09-30 LAB
BUN SERPL-MCNC: 13 MG/DL — SIGNIFICANT CHANGE UP (ref 7–23)
CALCIUM SERPL-MCNC: 8.9 MG/DL — SIGNIFICANT CHANGE UP (ref 8.4–10.5)
CHLORIDE SERPL-SCNC: 103 MMOL/L — SIGNIFICANT CHANGE UP (ref 98–107)
CO2 SERPL-SCNC: 24 MMOL/L — SIGNIFICANT CHANGE UP (ref 22–31)
CREAT SERPL-MCNC: 0.88 MG/DL — SIGNIFICANT CHANGE UP (ref 0.5–1.3)
GLUCOSE SERPL-MCNC: 96 MG/DL — SIGNIFICANT CHANGE UP (ref 70–99)
HCT VFR BLD CALC: 29.3 % — LOW (ref 39–50)
HGB BLD-MCNC: 8.8 G/DL — LOW (ref 13–17)
MCHC RBC-ENTMCNC: 25 PG — LOW (ref 27–34)
MCHC RBC-ENTMCNC: 30 % — LOW (ref 32–36)
MCV RBC AUTO: 83.2 FL — SIGNIFICANT CHANGE UP (ref 80–100)
NRBC # FLD: 0 — SIGNIFICANT CHANGE UP
PLATELET # BLD AUTO: 258 K/UL — SIGNIFICANT CHANGE UP (ref 150–400)
PMV BLD: 9.4 FL — SIGNIFICANT CHANGE UP (ref 7–13)
POTASSIUM SERPL-MCNC: 4.3 MMOL/L — SIGNIFICANT CHANGE UP (ref 3.5–5.3)
POTASSIUM SERPL-SCNC: 4.3 MMOL/L — SIGNIFICANT CHANGE UP (ref 3.5–5.3)
RBC # BLD: 3.52 M/UL — LOW (ref 4.2–5.8)
RBC # FLD: 23.1 % — HIGH (ref 10.3–14.5)
SODIUM SERPL-SCNC: 141 MMOL/L — SIGNIFICANT CHANGE UP (ref 135–145)
WBC # BLD: 7.88 K/UL — SIGNIFICANT CHANGE UP (ref 3.8–10.5)
WBC # FLD AUTO: 7.88 K/UL — SIGNIFICANT CHANGE UP (ref 3.8–10.5)

## 2017-09-30 RX ADMIN — Medication 325 MILLIGRAM(S): at 18:23

## 2017-09-30 RX ADMIN — Medication 325 MILLIGRAM(S): at 09:14

## 2017-09-30 RX ADMIN — Medication 500 MILLIGRAM(S): at 21:32

## 2017-09-30 RX ADMIN — PANTOPRAZOLE SODIUM 40 MILLIGRAM(S): 20 TABLET, DELAYED RELEASE ORAL at 05:42

## 2017-09-30 RX ADMIN — Medication 650 MILLIGRAM(S): at 12:03

## 2017-09-30 RX ADMIN — Medication 650 MILLIGRAM(S): at 13:00

## 2017-09-30 RX ADMIN — Medication 500 MILLIGRAM(S): at 18:23

## 2017-09-30 RX ADMIN — Medication 500 MILLIGRAM(S): at 05:42

## 2017-09-30 RX ADMIN — Medication 0.25 MILLIGRAM(S): at 00:42

## 2017-09-30 RX ADMIN — Medication 1 TABLET(S): at 13:20

## 2017-09-30 RX ADMIN — Medication 500 MILLIGRAM(S): at 13:20

## 2017-09-30 RX ADMIN — Medication 37.5 MILLIGRAM(S): at 21:32

## 2017-09-30 RX ADMIN — PANTOPRAZOLE SODIUM 40 MILLIGRAM(S): 20 TABLET, DELAYED RELEASE ORAL at 18:22

## 2017-09-30 NOTE — PROGRESS NOTE ADULT - SUBJECTIVE AND OBJECTIVE BOX
INTERVAL HPI/OVERNIGHT EVENTS:  HPI:  63 y/o Male  PMH PUD with hx of bleeding ulcers in the past, d/c'd 8 days ago from [place] after admission for anemia sent back to ED for generalized weakness, fatigue, exertional SOB. Pt was transfused 5U PRBC during recent admission. Had negative occult blood at that time and capsule endoscopy was decided on for further w/u. Turned in capsule 2 days ago but no results yet. Seen by GI and Hematology during recent admission, but refused bone marrow biopsy. H/H 8/18/17 12/7/40/8; H/H upon admission in ED 6.2/20.5. Not on blood thinners. Unclear source for blood loss/etiology of anemia, suspected to be GI in nature. Pt denies chest pain or syncope, states has felt worsening fatigue and SOB. Called his hematologist Dmary Huang) and advised to go to ED. Last EGD July 2017, last Colonoscopy Aug. 2017, as outpatient, S/P CT A/P as well No clear source of bleeding was identified, pt denies any NSAID use for now, no blood in stool reported, no cough, no fever, no N/V, no abdominal pain, no wt Loss, no dysuria,   No new overnight event.  No N/V/D.  Tolerating diet.    MEDICATIONS  (STANDING):  venlafaxine XR. 37.5 milliGRAM(s) Oral at bedtime  pantoprazole  Injectable 40 milliGRAM(s) IV Push two times a day  influenza   Vaccine 0.5 milliLiter(s) IntraMuscular once  multivitamin 1 Tablet(s) Oral daily  ferrous    sulfate 325 milliGRAM(s) Oral two times a day with meals  ciprofloxacin     Tablet 500 milliGRAM(s) Oral every 12 hours  metroNIDAZOLE    Tablet 500 milliGRAM(s) Oral every 8 hours    MEDICATIONS  (PRN):  acetaminophen   Tablet. 650 milliGRAM(s) Oral every 6 hours PRN Mild Pain (1 - 3)  ALPRAZolam 0.25 milliGRAM(s) Oral at bedtime PRN Anxiety      Allergies    No Known Allergies    Intolerances          General:  No wt loss, fevers, chills, night sweats, fatigue,   Eyes:  Good vision, no reported pain  ENT:  No sore throat, pain, runny nose, dysphagia  CV:  No pain, palpitations, hypo/hypertension  Resp:  No dyspnea, cough, tachypnea, wheezing  GI:  No pain, No nausea, No vomiting, No diarrhea, No constipation, No weight loss, No fever, No pruritis, No rectal bleeding, No tarry stools, No dysphagia,  :  No pain, bleeding, incontinence, nocturia  Muscle:  No pain, weakness  Neuro:  No weakness, tingling, memory problems  Psych:  No fatigue, insomnia, mood problems, depression  Endocrine:  No polyuria, polydipsia, cold/heat intolerance  Heme:  No petechiae, ecchymosis, easy bruisability  Skin:  No rash, tattoos, scars, edema      PHYSICAL EXAM:   Vital Signs:  Vital Signs Last 24 Hrs  T(C): 36.9 (30 Sep 2017 13:38), Max: 37.2 (29 Sep 2017 21:37)  T(F): 98.4 (30 Sep 2017 13:38), Max: 98.9 (29 Sep 2017 21:37)  HR: 67 (30 Sep 2017 13:38) (63 - 70)  BP: 113/62 (30 Sep 2017 13:38) (110/62 - 127/70)  BP(mean): --  RR: 18 (30 Sep 2017 13:38) (17 - 19)  SpO2: 96% (30 Sep 2017 13:38) (96% - 98%)  Daily     Daily I&O's Summary    29 Sep 2017 07:01  -  30 Sep 2017 07:00  --------------------------------------------------------  IN: 580 mL / OUT: 1150 mL / NET: -570 mL        GENERAL:  Appears stated age, well-groomed, well-nourished, no distress  HEENT:  NC/AT,  conjunctivae clear and pink, no thyromegaly, nodules, adenopathy, no JVD, sclera -anicteric  CHEST:  Full & symmetric excursion, no increased effort, breath sounds clear  HEART:  Regular rhythm, S1, S2, no murmur/rub/S3/S4, no abdominal bruit, no edema  ABDOMEN:  Soft, non-tender, non-distended, normoactive bowel sounds,  no masses ,no hepato-splenomegaly, no signs of chronic liver disease  EXTEREMITIES:  no cyanosis,clubbing or edema  SKIN:  No rash/erythema/ecchymoses/petechiae/wounds/abscess/warm/dry  NEURO:  Alert, oriented, no asterixis, no tremor, no encephalopathy      LABS:                        8.8    7.88  )-----------( 258      ( 30 Sep 2017 06:35 )             29.3     09-30    141  |  103  |  13  ----------------------------<  96  4.3   |  24  |  0.88    Ca    8.9      30 Sep 2017 06:35          amylase   lipase  RADIOLOGY & ADDITIONAL TESTS:

## 2017-09-30 NOTE — PROGRESS NOTE ADULT - SUBJECTIVE AND OBJECTIVE BOX
Chief Complaint:  Patient is a 62y old  Male who presents with a chief complaint of symptomatic Anemia, GI Bleed, (17 Sep 2017 22:23)      Interval Events:   EGD Thursday found esophageal mass most likely benign.  No other episodes of bleeding  Hb and vitals stable.    Allergies:  No Known Allergies      Home Medications:    Hospital Medications:  venlafaxine XR. 37.5 milliGRAM(s) Oral at bedtime  pantoprazole  Injectable 40 milliGRAM(s) IV Push two times a day  influenza   Vaccine 0.5 milliLiter(s) IntraMuscular once  acetaminophen   Tablet. 650 milliGRAM(s) Oral every 6 hours PRN  ALPRAZolam 0.25 milliGRAM(s) Oral at bedtime PRN  multivitamin 1 Tablet(s) Oral daily  ferrous    sulfate 325 milliGRAM(s) Oral two times a day with meals  ciprofloxacin     Tablet 500 milliGRAM(s) Oral every 12 hours  metroNIDAZOLE    Tablet 500 milliGRAM(s) Oral every 8 hours      PMHX/PSHX:  Obesity  Back pain  HTN (hypertension)  Gastrointestinal ulcer  Anxiety  Seasonal allergies  Depression  H/O inguinal hernia repair      Family history:  Family history of liver cancer (Father)  Family history of colon cancer (Mother)  Family history of hypertension      ROS:     General:  No wt loss, fevers, chills, night sweats, fatigue,   Eyes:  Good vision, no reported pain  ENT:  No sore throat, pain, runny nose, dysphagia  CV:  No pain, palpitations, hypo/hypertension  Resp:  No dyspnea, cough, tachypnea, wheezing  GI:  No pain, No nausea, No vomiting, No diarrhea, No constipation, No weight loss, No fever, No pruritis, No rectal bleeding, No tarry stools, No dysphagia,  :  No pain, bleeding, incontinence, nocturia  Muscle:  No pain, weakness  Neuro:  No weakness, tingling, memory problems  Psych:  No fatigue, insomnia, mood problems, depression  Endocrine:  No polyuria, polydipsia, cold/heat intolerance  Heme:  No petechiae, ecchymosis, easy bruisability  Skin:  No rash, tattoos, scars, edema      PHYSICAL EXAM:   Vital Signs:  Vital Signs Last 24 Hrs  T(C): 36.6 (30 Sep 2017 05:40), Max: 37.2 (29 Sep 2017 21:37)  T(F): 97.8 (30 Sep 2017 05:40), Max: 98.9 (29 Sep 2017 21:37)  HR: 68 (30 Sep 2017 05:40) (63 - 70)  BP: 110/62 (30 Sep 2017 05:40) (110/62 - 127/70)  BP(mean): --  RR: 17 (30 Sep 2017 05:40) (17 - 19)  SpO2: 97% (30 Sep 2017 05:40) (97% - 98%)  Daily     Daily     GENERAL:  Appears stated age, well-groomed, well-nourished, no distress  HEENT:  NC/AT,  conjunctivae clear and pink, no thyromegaly, nodules, adenopathy, no JVD, sclera -anicteric  CHEST:  Full & symmetric excursion, no increased effort, breath sounds clear  HEART:  Regular rhythm, S1, S2, no murmur/rub/S3/S4, no abdominal bruit, no edema  ABDOMEN:  Soft, non-tender, non-distended, normoactive bowel sounds,  no masses ,no hepato-splenomegaly, no signs of chronic liver disease  EXTEREMITIES:  no cyanosis,clubbing or edema  SKIN:  No rash/erythema/ecchymoses/petechiae/wounds/abscess/warm/dry  NEURO:  Alert, oriented, no asterixis, no tremor, no encephalopathy    LABS:                        8.8    7.88  )-----------( 258      ( 30 Sep 2017 06:35 )             29.3     09-30    141  |  103  |  13  ----------------------------<  96  4.3   |  24  |  0.88    Ca    8.9      30 Sep 2017 06:35  Phos  3.9     09-28  Mg     1.8     09-28        PT/INR - ( 28 Sep 2017 10:20 )   PT: 12.2 SEC;   INR: 1.09          PTT - ( 28 Sep 2017 10:20 )  PTT:30.5 SEC        Imaging: Chief Complaint:  Patient is a 62y old  Male who presents with a chief complaint of symptomatic Anemia, GI Bleed, (17 Sep 2017 22:23)      Interval Events:   EGD Thursday found esophageal mass most likely benign. s/p endoscopic resection.  No other episodes of bleeding post procedure  Hb and vitals stable.  No complaints this morning  No fevers or chills or abdominal pain    Allergies:  No Known Allergies      Home Medications:    Hospital Medications:  venlafaxine XR. 37.5 milliGRAM(s) Oral at bedtime  pantoprazole  Injectable 40 milliGRAM(s) IV Push two times a day  influenza   Vaccine 0.5 milliLiter(s) IntraMuscular once  acetaminophen   Tablet. 650 milliGRAM(s) Oral every 6 hours PRN  ALPRAZolam 0.25 milliGRAM(s) Oral at bedtime PRN  multivitamin 1 Tablet(s) Oral daily  ferrous    sulfate 325 milliGRAM(s) Oral two times a day with meals  ciprofloxacin     Tablet 500 milliGRAM(s) Oral every 12 hours  metroNIDAZOLE    Tablet 500 milliGRAM(s) Oral every 8 hours      PMHX/PSHX:  Obesity  Back pain  HTN (hypertension)  Gastrointestinal ulcer  Anxiety  Seasonal allergies  Depression  H/O inguinal hernia repair      Family history:  Family history of liver cancer (Father)  Family history of colon cancer (Mother)  Family history of hypertension      ROS:     General:  No wt loss, fevers, chills, night sweats, fatigue,   Eyes:  Good vision, no reported pain  ENT:  No sore throat, pain, runny nose, dysphagia  CV:  No pain, palpitations, hypo/hypertension  Resp:  No dyspnea, cough, tachypnea, wheezing  GI:  No pain, No nausea, No vomiting, No diarrhea, No constipation, No weight loss, No fever, No pruritis, No rectal bleeding, No tarry stools, No dysphagia,  :  No pain, bleeding, incontinence, nocturia  Muscle:  No pain, weakness  Neuro:  No weakness, tingling, memory problems  Psych:  No fatigue, insomnia, mood problems, depression  Endocrine:  No polyuria, polydipsia, cold/heat intolerance  Heme:  No petechiae, ecchymosis, easy bruisability  Skin:  No rash, tattoos, scars, edema      PHYSICAL EXAM:   Vital Signs:  Vital Signs Last 24 Hrs  T(C): 36.6 (30 Sep 2017 05:40), Max: 37.2 (29 Sep 2017 21:37)  T(F): 97.8 (30 Sep 2017 05:40), Max: 98.9 (29 Sep 2017 21:37)  HR: 68 (30 Sep 2017 05:40) (63 - 70)  BP: 110/62 (30 Sep 2017 05:40) (110/62 - 127/70)  BP(mean): --  RR: 17 (30 Sep 2017 05:40) (17 - 19)  SpO2: 97% (30 Sep 2017 05:40) (97% - 98%)  Daily     Daily     GENERAL:  Appears stated age, well-groomed, well-nourished, no distress  HEENT:  NC/AT,  conjunctivae clear and pink, no thyromegaly, nodules, adenopathy, no JVD, sclera -anicteric  CHEST:  Full & symmetric excursion, no increased effort, breath sounds clear  HEART:  Regular rhythm, S1, S2, no murmur/rub/S3/S4, no abdominal bruit, no edema  ABDOMEN:  Soft, non-tender, non-distended, normoactive bowel sounds,  no masses ,no hepato-splenomegaly, no signs of chronic liver disease  EXTEREMITIES:  no cyanosis,clubbing or edema  SKIN:  No rash/erythema/ecchymoses/petechiae/wounds/abscess/warm/dry  NEURO:  Alert, oriented, no asterixis, no tremor, no encephalopathy    LABS:                        8.8    7.88  )-----------( 258      ( 30 Sep 2017 06:35 )             29.3     09-30    141  |  103  |  13  ----------------------------<  96  4.3   |  24  |  0.88    Ca    8.9      30 Sep 2017 06:35  Phos  3.9     09-28  Mg     1.8     09-28        PT/INR - ( 28 Sep 2017 10:20 )   PT: 12.2 SEC;   INR: 1.09          PTT - ( 28 Sep 2017 10:20 )  PTT:30.5 SEC        Imaging:

## 2017-09-30 NOTE — PROGRESS NOTE ADULT - PROBLEM SELECTOR PLAN 2
- xanax prn
- xanax prn
will need surgical eval as out pt
- xanax prn

## 2017-09-30 NOTE — PROGRESS NOTE ADULT - SUBJECTIVE AND OBJECTIVE BOX
Patient is a 62y old  Male who presents with a chief complaint of symptomatic Anemia, GI Bleed, (17 Sep 2017 22:23)    pt. seen and examined, tolerating diet. no bleeding    INTERVAL HPI/OVERNIGHT EVENTS:  T(C): 36.9 (09-30-17 @ 13:38), Max: 37.2 (09-29-17 @ 21:37)  HR: 67 (09-30-17 @ 13:38) (63 - 68)  BP: 113/62 (09-30-17 @ 13:38) (110/62 - 127/70)  RR: 18 (09-30-17 @ 13:38) (17 - 19)  SpO2: 96% (09-30-17 @ 13:38) (96% - 98%)  Wt(kg): --  I&O's Summary    29 Sep 2017 07:01  -  30 Sep 2017 07:00  --------------------------------------------------------  IN: 580 mL / OUT: 1150 mL / NET: -570 mL    30 Sep 2017 07:01  -  30 Sep 2017 18:51  --------------------------------------------------------  IN: 1600 mL / OUT: 1560 mL / NET: 40 mL        PAST MEDICAL & SURGICAL HISTORY:  Obesity  Back pain  HTN (hypertension)  Gastrointestinal ulcer  Anxiety  Seasonal allergies  H/O inguinal hernia repair      SOCIAL HISTORY  Alcohol:  Tobacco:  Illicit substance use:    FAMILY HISTORY:    REVIEW OF SYSTEMS:  CONSTITUTIONAL: No fever, weight loss, or fatigue  EYES: No eye pain, visual disturbances, or discharge  ENMT:  No difficulty hearing, tinnitus, vertigo; No sinus or throat pain  NECK: No pain or stiffness  RESPIRATORY: No cough, wheezing, chills or hemoptysis; No shortness of breath  CARDIOVASCULAR: No chest pain, palpitations, dizziness, or leg swelling  GASTROINTESTINAL: No abdominal or epigastric pain. No nausea, vomiting, or hematemesis; No diarrhea or constipation. No melena or hematochezia.  GENITOURINARY: No dysuria, frequency, hematuria, or incontinence  NEUROLOGICAL: No headaches, memory loss, loss of strength, numbness, or tremors  SKIN: No itching, burning, rashes, or lesions   LYMPH NODES: No enlarged glands  ENDOCRINE: No heat or cold intolerance; No hair loss  MUSCULOSKELETAL: No joint pain or swelling; No muscle, back, or extremity pain  PSYCHIATRIC: No depression, anxiety, mood swings, or difficulty sleeping  HEME/LYMPH: No easy bruising, or bleeding gums  ALLERY AND IMMUNOLOGIC: No hives or eczema    RADIOLOGY & ADDITIONAL TESTS:    Imaging Personally Reviewed:  [ ] YES  [ ] NO    Consultant(s) Notes Reviewed:  [ ] YES  [ ] NO    PHYSICAL EXAM:  GENERAL: NAD, well-groomed, well-developed  HEAD:  Atraumatic, Normocephalic  EYES: EOMI, PERRLA, conjunctiva and sclera clear  ENMT: No tonsillar erythema, exudates, or enlargement; Moist mucous membranes, Good dentition, No lesions  NECK: Supple, No JVD, Normal thyroid  NERVOUS SYSTEM:  Alert & Oriented X3, Good concentration; Motor Strength 5/5 B/L upper and lower extremities; DTRs 2+ intact and symmetric  CHEST/LUNG: Clear to percussion bilaterally; No rales, rhonchi, wheezing, or rubs  HEART: Regular rate and rhythm; No murmurs, rubs, or gallops  ABDOMEN: Soft, Nontender, Nondistended; Bowel sounds present  EXTREMITIES:  2+ Peripheral Pulses, No clubbing, cyanosis, or edema  LYMPH: No lymphadenopathy noted  SKIN: No rashes or lesions    LABS:                        8.8    7.88  )-----------( 258      ( 30 Sep 2017 06:35 )             29.3     09-30    141  |  103  |  13  ----------------------------<  96  4.3   |  24  |  0.88    Ca    8.9      30 Sep 2017 06:35          CAPILLARY BLOOD GLUCOSE                MEDICATIONS  (STANDING):  venlafaxine XR. 37.5 milliGRAM(s) Oral at bedtime  pantoprazole  Injectable 40 milliGRAM(s) IV Push two times a day  influenza   Vaccine 0.5 milliLiter(s) IntraMuscular once  multivitamin 1 Tablet(s) Oral daily  ferrous    sulfate 325 milliGRAM(s) Oral two times a day with meals  ciprofloxacin     Tablet 500 milliGRAM(s) Oral every 12 hours  metroNIDAZOLE    Tablet 500 milliGRAM(s) Oral every 8 hours    MEDICATIONS  (PRN):  acetaminophen   Tablet. 650 milliGRAM(s) Oral every 6 hours PRN Mild Pain (1 - 3)  ALPRAZolam 0.25 milliGRAM(s) Oral at bedtime PRN Anxiety      Care Discussed with Consultants/Other Providers [ ] YES  [ ] NO

## 2017-09-30 NOTE — PROGRESS NOTE ADULT - PROBLEM SELECTOR PLAN 3
- bp control per primary team appreciated
- bp control per primary team appreciated
2/2 ac blood loss  -H/H stable now
- bp control per primary team appreciated

## 2017-10-01 ENCOUNTER — TRANSCRIPTION ENCOUNTER (OUTPATIENT)
Age: 62
End: 2017-10-01

## 2017-10-01 VITALS
HEART RATE: 69 BPM | DIASTOLIC BLOOD PRESSURE: 63 MMHG | OXYGEN SATURATION: 99 % | SYSTOLIC BLOOD PRESSURE: 125 MMHG | TEMPERATURE: 98 F | RESPIRATION RATE: 18 BRPM

## 2017-10-01 LAB
BUN SERPL-MCNC: 23 MG/DL — SIGNIFICANT CHANGE UP (ref 7–23)
CALCIUM SERPL-MCNC: 8.6 MG/DL — SIGNIFICANT CHANGE UP (ref 8.4–10.5)
CHLORIDE SERPL-SCNC: 101 MMOL/L — SIGNIFICANT CHANGE UP (ref 98–107)
CO2 SERPL-SCNC: 26 MMOL/L — SIGNIFICANT CHANGE UP (ref 22–31)
CREAT SERPL-MCNC: 0.85 MG/DL — SIGNIFICANT CHANGE UP (ref 0.5–1.3)
GLUCOSE SERPL-MCNC: 100 MG/DL — HIGH (ref 70–99)
HCT VFR BLD CALC: 29 % — LOW (ref 39–50)
HGB BLD-MCNC: 8.5 G/DL — LOW (ref 13–17)
MCHC RBC-ENTMCNC: 24.4 PG — LOW (ref 27–34)
MCHC RBC-ENTMCNC: 29.3 % — LOW (ref 32–36)
MCV RBC AUTO: 83.1 FL — SIGNIFICANT CHANGE UP (ref 80–100)
NRBC # FLD: 0 — SIGNIFICANT CHANGE UP
PLATELET # BLD AUTO: 285 K/UL — SIGNIFICANT CHANGE UP (ref 150–400)
PMV BLD: 9.9 FL — SIGNIFICANT CHANGE UP (ref 7–13)
POTASSIUM SERPL-MCNC: 4 MMOL/L — SIGNIFICANT CHANGE UP (ref 3.5–5.3)
POTASSIUM SERPL-SCNC: 4 MMOL/L — SIGNIFICANT CHANGE UP (ref 3.5–5.3)
RBC # BLD: 3.49 M/UL — LOW (ref 4.2–5.8)
RBC # FLD: 22.2 % — HIGH (ref 10.3–14.5)
SODIUM SERPL-SCNC: 138 MMOL/L — SIGNIFICANT CHANGE UP (ref 135–145)
WBC # BLD: 6.76 K/UL — SIGNIFICANT CHANGE UP (ref 3.8–10.5)
WBC # FLD AUTO: 6.76 K/UL — SIGNIFICANT CHANGE UP (ref 3.8–10.5)

## 2017-10-01 RX ORDER — FERROUS SULFATE 325(65) MG
1 TABLET ORAL
Qty: 60 | Refills: 0 | OUTPATIENT
Start: 2017-10-01 | End: 2017-10-31

## 2017-10-01 RX ORDER — ACETAMINOPHEN 500 MG
2 TABLET ORAL
Qty: 0 | Refills: 0 | COMMUNITY
Start: 2017-10-01

## 2017-10-01 RX ORDER — ALPRAZOLAM 0.25 MG
1 TABLET ORAL
Qty: 0 | Refills: 0 | COMMUNITY
Start: 2017-10-01

## 2017-10-01 RX ORDER — ZOLPIDEM TARTRATE 10 MG/1
0.5 TABLET ORAL
Qty: 0 | Refills: 0 | COMMUNITY

## 2017-10-01 RX ORDER — LOSARTAN POTASSIUM 100 MG/1
0.5 TABLET, FILM COATED ORAL
Qty: 0 | Refills: 0 | COMMUNITY

## 2017-10-01 RX ORDER — METRONIDAZOLE 500 MG
1 TABLET ORAL
Qty: 0 | Refills: 0 | COMMUNITY
Start: 2017-10-01

## 2017-10-01 RX ORDER — LOSARTAN POTASSIUM 100 MG/1
1 TABLET, FILM COATED ORAL
Qty: 0 | Refills: 0 | COMMUNITY
Start: 2017-10-01

## 2017-10-01 RX ORDER — TRAMADOL HYDROCHLORIDE 50 MG/1
1 TABLET ORAL
Qty: 0 | Refills: 0 | COMMUNITY

## 2017-10-01 RX ORDER — LOSARTAN POTASSIUM 100 MG/1
25 TABLET, FILM COATED ORAL DAILY
Qty: 0 | Refills: 0 | Status: DISCONTINUED | OUTPATIENT
Start: 2017-10-01 | End: 2017-10-01

## 2017-10-01 RX ORDER — ALPRAZOLAM 0.25 MG
0.5 TABLET ORAL
Qty: 0 | Refills: 0 | COMMUNITY

## 2017-10-01 RX ORDER — PANTOPRAZOLE SODIUM 20 MG/1
1 TABLET, DELAYED RELEASE ORAL
Qty: 30 | Refills: 0 | OUTPATIENT
Start: 2017-10-01 | End: 2017-10-31

## 2017-10-01 RX ORDER — CIPROFLOXACIN LACTATE 400MG/40ML
1 VIAL (ML) INTRAVENOUS
Qty: 0 | Refills: 0 | COMMUNITY
Start: 2017-10-01

## 2017-10-01 RX ORDER — ACETAMINOPHEN 500 MG
2 TABLET ORAL
Qty: 0 | Refills: 0 | COMMUNITY

## 2017-10-01 RX ORDER — VENLAFAXINE HCL 75 MG
0.5 CAPSULE, EXT RELEASE 24 HR ORAL
Qty: 0 | Refills: 0 | COMMUNITY

## 2017-10-01 RX ORDER — LOSARTAN POTASSIUM 100 MG/1
1 TABLET, FILM COATED ORAL
Qty: 30 | Refills: 0 | OUTPATIENT
Start: 2017-10-01 | End: 2017-10-31

## 2017-10-01 RX ORDER — VENLAFAXINE HCL 75 MG
1 CAPSULE, EXT RELEASE 24 HR ORAL
Qty: 0 | Refills: 0 | COMMUNITY
Start: 2017-10-01

## 2017-10-01 RX ADMIN — Medication 500 MILLIGRAM(S): at 05:52

## 2017-10-01 RX ADMIN — Medication 325 MILLIGRAM(S): at 10:46

## 2017-10-01 RX ADMIN — Medication 1 TABLET(S): at 13:50

## 2017-10-01 RX ADMIN — Medication 500 MILLIGRAM(S): at 13:50

## 2017-10-01 RX ADMIN — Medication 0.25 MILLIGRAM(S): at 01:23

## 2017-10-01 RX ADMIN — Medication 500 MILLIGRAM(S): at 18:11

## 2017-10-01 RX ADMIN — Medication 325 MILLIGRAM(S): at 18:11

## 2017-10-01 RX ADMIN — PANTOPRAZOLE SODIUM 40 MILLIGRAM(S): 20 TABLET, DELAYED RELEASE ORAL at 18:11

## 2017-10-01 RX ADMIN — PANTOPRAZOLE SODIUM 40 MILLIGRAM(S): 20 TABLET, DELAYED RELEASE ORAL at 05:52

## 2017-10-01 NOTE — PROGRESS NOTE ADULT - SUBJECTIVE AND OBJECTIVE BOX
_________________________________________________________________________________________  ========>>  M E D I C A L   A T T E N D I N G    F O L L O W  U P  N O T E  <<=========  -----------------------------------------------------------------------------------------------------    - Patient seen and examined by me approximately thirty minutes ago.  - In summary, patient is a 62y year old man who originally presented with anemia  - Patient today overall doing ok, comfortable, no BM, no bleeding reported, a bit tired.  tolerating diet well    ==================>> MEDICATIONS <<====================    venlafaxine XR. 37.5 milliGRAM(s) Oral at bedtime  pantoprazole  Injectable 40 milliGRAM(s) IV Push two times a day  influenza   Vaccine 0.5 milliLiter(s) IntraMuscular once  multivitamin 1 Tablet(s) Oral daily  ferrous    sulfate 325 milliGRAM(s) Oral two times a day with meals  ciprofloxacin     Tablet 500 milliGRAM(s) Oral every 12 hours  metroNIDAZOLE    Tablet 500 milliGRAM(s) Oral every 8 hours    MEDICATIONS  (PRN):  acetaminophen   Tablet. 650 milliGRAM(s) Oral every 6 hours PRN Mild Pain (1 - 3)  ALPRAZolam 0.25 milliGRAM(s) Oral at bedtime PRN Anxiety    ==================>> REVIEW OF SYSTEM <<=================    GEN: no fever, no chills, no pain  RESP: no SOB, no cough, no sputum  CVS: no chest pain, no palpitations, no edema  GI: no abdominal pain, no nausea, no constipation, no diarrhea, no bleeding   : no dysuria, no frequency, no hematuria  Neuro: no headache, no diziness  Derm : no itching, no rash    ==================>> VITAL SIGNS <<==================    Vital Signs Last 24 Hrs  T(C): 36.4 (10-01-17 @ 14:33)  T(F): 97.6 (10-01-17 @ 14:33), Max: 98.3 (10-01-17 @ 05:50)  HR: 66 (10-01-17 @ 14:33) (60 - 70)  BP: 124/76 (10-01-17 @ 14:33)  BP(mean): --  RR: 17 (10-01-17 @ 14:33) (17 - 18)  SpO2: 99% (10-01-17 @ 14:33) (96% - 99%)      ==================>> PHYSICAL EXAM <<=================    GEN: A&O X 3 , NAD , comfortable  HEENT: NCAT, PERRL, MMM, hearing intact  Neck: supple , no JVD  CVS: S1S2 , regular , No M/R/G appreciated  PULM: CTA B/L,  no W/R/R appreciated  ABD.: soft. non tender, non distended,  bowel sounds present  Extrem: intact pulses , no edema       Left forerm and antecubital IV infiltration and swelling / tenderness / thrombophlebitis, overall seems improved   PSYCH : normal mood,  not anxious     ==================>> LAB AND IMAGING <<==================                                                         8.5    6.76  )-----------( 285      ( 01 Oct 2017 06:45 )             29.0     Hemoglobin:   8.5  (10-01 @ 06:45)   Hemoglobin:   8.8  (09-30 @ 06:35)   Hemoglobin:   8.6  (09-29 @ 06:30)   Hemoglobin:   8.5  (09-28 @ 10:20)   Hemoglobin:   8.3  (09-27 @ 07:00)     138  |  101  |  23  ----------------------------<  100<H>  4.0   |  26  |  0.85    Ca    8.6      01 Oct 2017 06:00    pending pathology  ___________________________________________________________________________________  ===============>>  A S S E S S M E N T   A N D   P L A N <<===============  ------------------------------------------------------------------------------------------    Problem/Plan - 1:  ·  Problem: GI bleed, likely due to gastric mass found on EGD, now post endoscopic resection  GI f/u appreciated  monitor H/H closely: stable  PPI, iron and MVI  tolerating regular diet  awaiting BM /monitor  for bleeding  will eventually need sirgical mgmt of Hiatal hernia  likely DC abx on DC home    Problem/Plan - 2:  ·  Problem: IV line infiltration with thrombophlebitis , pain and swelling  no evidence of super-infection  elevation  heat packs and pain mgmt, hydraiton  discussed with pt    Problem/Plan - 3  ·  Problem: Anxiety  on Xanax PRN, Effexor  pt reassured     Problem/Plan - 4  ·  Problem: HTN   can resume low diose Losartan (pt states was given for low EF!!)    Problem/Plan - 5  ·  Problem: Back pain.  HX of Disc Herniation   on Tramadol PRN at home.     -GI/DVT Prophylaxis: PPI, ambulate  -DC planing home    --------------------------------------------  Case discussed with pt  Education given on plan of care and close OP f/u  ___________________________  H. DORY Santos.  Pager: [phone]

## 2017-10-01 NOTE — PROGRESS NOTE ADULT - SUBJECTIVE AND OBJECTIVE BOX
INTERVAL HPI/OVERNIGHT EVENTS:  HPI:  63 y/o Male  PMH PUD with hx of bleeding ulcers in the past, d/c'd 8 days ago from [place] after admission for anemia sent back to ED for generalized weakness, fatigue, exertional SOB. Pt was transfused 5U PRBC during recent admission. Had negative occult blood at that time and capsule endoscopy was decided on for further w/u. Turned in capsule 2 days ago but no results yet. Seen by GI and Hematology during recent admission, but refused bone marrow biopsy. H/H 8/18/17 12/7/40/8; H/H upon admission in ED 6.2/20.5. Not on blood thinners. Unclear source for blood loss/etiology of anemia, suspected to be GI in nature. Pt denies chest pain or syncope, states has felt worsening fatigue and SOB. Called his hematologist Dmary Huang) and advised to go to ED. Last EGD July 2017, last Colonoscopy Aug. 2017, as outpatient, S/P CT A/P as well No clear source of bleeding was identified, pt denies any NSAID use for now, no blood in stool reported, no cough, no fever, no N/V, no abdominal pain, no wt Loss, no dysuria, pt's Hgb 4.9 tonight dropped  from 9.5  No new overnight event.  No N/V/D.  Tolerating diet.      MEDICATIONS  (STANDING):  venlafaxine XR. 37.5 milliGRAM(s) Oral at bedtime  pantoprazole  Injectable 40 milliGRAM(s) IV Push two times a day  influenza   Vaccine 0.5 milliLiter(s) IntraMuscular once  multivitamin 1 Tablet(s) Oral daily  ferrous    sulfate 325 milliGRAM(s) Oral two times a day with meals  ciprofloxacin     Tablet 500 milliGRAM(s) Oral every 12 hours  metroNIDAZOLE    Tablet 500 milliGRAM(s) Oral every 8 hours  losartan 25 milliGRAM(s) Oral daily    MEDICATIONS  (PRN):  acetaminophen   Tablet. 650 milliGRAM(s) Oral every 6 hours PRN Mild Pain (1 - 3)  ALPRAZolam 0.25 milliGRAM(s) Oral at bedtime PRN Anxiety      Allergies    No Known Allergies    Intolerances          General:  No wt loss, fevers, chills, night sweats, fatigue,   Eyes:  Good vision, no reported pain  ENT:  No sore throat, pain, runny nose, dysphagia  CV:  No pain, palpitations, hypo/hypertension  Resp:  No dyspnea, cough, tachypnea, wheezing  GI:  No pain, No nausea, No vomiting, No diarrhea, No constipation, No weight loss, No fever, No pruritis, No rectal bleeding, No tarry stools, No dysphagia,  :  No pain, bleeding, incontinence, nocturia  Muscle:  No pain, weakness  Neuro:  No weakness, tingling, memory problems  Psych:  No fatigue, insomnia, mood problems, depression  Endocrine:  No polyuria, polydipsia, cold/heat intolerance  Heme:  No petechiae, ecchymosis, easy bruisability  Skin:  No rash, tattoos, scars, edema      PHYSICAL EXAM:   Vital Signs:  Vital Signs Last 24 Hrs  T(C): 36.7 (01 Oct 2017 18:13), Max: 36.8 (01 Oct 2017 05:50)  T(F): 98 (01 Oct 2017 18:13), Max: 98.3 (01 Oct 2017 05:50)  HR: 69 (01 Oct 2017 18:13) (60 - 70)  BP: 125/63 (01 Oct 2017 18:13) (122/75 - 125/82)  BP(mean): --  RR: 18 (01 Oct 2017 18:13) (17 - 18)  SpO2: 99% (01 Oct 2017 18:13) (96% - 99%)  Daily     Daily I&O's Summary    30 Sep 2017 07:01  -  01 Oct 2017 07:00  --------------------------------------------------------  IN: 2080 mL / OUT: 1960 mL / NET: 120 mL    01 Oct 2017 07:01  -  01 Oct 2017 20:10  --------------------------------------------------------  IN: 200 mL / OUT: 0 mL / NET: 200 mL        GENERAL:  Appears stated age, well-groomed, well-nourished, no distress  HEENT:  NC/AT,  conjunctivae clear and pink, no thyromegaly, nodules, adenopathy, no JVD, sclera -anicteric  CHEST:  Full & symmetric excursion, no increased effort, breath sounds clear  HEART:  Regular rhythm, S1, S2, no murmur/rub/S3/S4, no abdominal bruit, no edema  ABDOMEN:  Soft, non-tender, non-distended, normoactive bowel sounds,  no masses ,no hepato-splenomegaly, no signs of chronic liver disease  EXTEREMITIES:  no cyanosis,clubbing or edema  SKIN:  No rash/erythema/ecchymoses/petechiae/wounds/abscess/warm/dry  NEURO:  Alert, oriented, no asterixis, no tremor, no encephalopathy      LABS:                        8.5    6.76  )-----------( 285      ( 01 Oct 2017 06:45 )             29.0     10-01    138  |  101  |  23  ----------------------------<  100<H>  4.0   |  26  |  0.85    Ca    8.6      01 Oct 2017 06:00          amylase   lipase  RADIOLOGY & ADDITIONAL TESTS:

## 2017-10-01 NOTE — PROGRESS NOTE ADULT - PROBLEM SELECTOR PROBLEM 2
Anxiety
Hiatal hernia
Anxiety

## 2017-10-01 NOTE — PROGRESS NOTE ADULT - PROBLEM SELECTOR PROBLEM 1
Gastrointestinal hemorrhage with melena
GI bleed
Gastrointestinal hemorrhage with melena

## 2017-10-01 NOTE — PROGRESS NOTE ADULT - ASSESSMENT
GI hemmorrhage with melena  - s/p EGD 9/19 with a Submucosal GE junction lesion likely responsible for anemia  - EGD pathology negative for malignant cells  - s/p EUS/FNA 9/20 with path negative for malignant cells, was a reactive lymph node collected from mediastinal mass  - s/p endoscopic resection of lipoma with house gi, 9/28    PLAN  -advance diet as tolerated  -continue PPI PO BID for at least 2 weeks  -GI will follow
63 yo man with recurrent GI bleeding due to GE junction lipoma within hiatus hernia that due to mechanical trauma caused ulceration (both of lipoma and hiatus hernia with Patrciio's erosions) s/p endoscopic resection.
GI hemmorrhage with melena  - s/p EGD 9/19 with a Submucosal GE junction lesion likely responsible for anemia  - EGD pathology negative for malignant cells  - s/p EUS/FNA 9/20 with path negative for malignant cells, was a reactive lymph node collected from mediastinal mass  - s/p endoscopic resection of lipoma with house gi, 9/28    PLAN  -advance diet as tolerated  -continue PPI PO BID for at least 2 weeks  -GI will follow  ct surgery evaluation for hernia repair
GI hemmorrhage with melena  - s/p EGD 9/19 with a Submucosal GE junction lesion likely responsible for anemia  - EGD pathology negative for malignant cells  - s/p EUS/FNA 9/20 with path negative for malignant cells, was a reactive lymph node collected from mediastinal mass  - s/p endoscopic resection of lipoma with house gi, 9/28    PLAN  -advance diet as tolerated  -continue PPI PO BID for at least 2 weeks  -GI will follow  ct surgery evaluation for hernia repair
awaiting bx results from EUS/EGD to determine operative plan as lesion is at distal esophagus/proximal stomach and surgery would entail partial esophagogastrectomy. Cont care per primary team. Will follow

## 2017-10-01 NOTE — DISCHARGE NOTE ADULT - PLAN OF CARE
Continue Iron supplement You had EGD done on 9/19 with a Submucosal GE junction lesion likely responsible for anemia. EGD pathology negative for malignant cells. You had EUS/FNA 9/20 with path negative for malignant cells, was a reactive lymph node collected from mediastinal mass. You had endoscopic resection of lipoma with house GI, 9/28/17. Advanced diet as tolerated, continue Protonix PO BID for at least 2 weeks, GI will follow as out pt. CT surgery evaluation for hernia repair. You had EGD done on 9/19 with a Submucosal GE junction lesion likely responsible for anemia. EGD pathology negative for malignant cells. You had EUS /FNA 9/20 with path negative for malignant cells, was a reactive lymph node collected from mediastinal mass. You had endoscopic resection of lipoma with house GI, 9/28/17. Advanced diet as tolerated, continue Protonix PO BID for at least 2 weeks, GI will follow as out pt. CT surgery evaluation for hernia repair. Follow up with PCP as outpatient for further management and treatment. Continue taking Losartan as prescribed. Monitor blood pressure at home. Follow up with Dr Mancia as outpatient for further management. Continue taking Effexor and Xanax as needed.

## 2017-10-01 NOTE — DISCHARGE NOTE ADULT - CARE PROVIDER_API CALL
Madhav Ortiz (PhD), Medical Physics  [address] [place] [address]  Phone: [phone]  Fax: [phone]    Christiano Hines (DO), Gastroenterology; Internal Medicine  [address] [place] [id number]  Phone: [phone]  Fax: [phone]    Lyndon Huang), Internal Medicine  [address] [place] [address]  Phone: [phone]  Fax: [phone]

## 2017-10-01 NOTE — DISCHARGE NOTE ADULT - MEDICATION SUMMARY - MEDICATIONS TO STOP TAKING
I will STOP taking the medications listed below when I get home from the hospital:    Ambien 10 mg oral tablet  -- 0.5 tab(s) by mouth once a day (at bedtime), As Needed    traMADol 50 mg oral tablet  -- 1 tab(s) by mouth every 4 hours, As Needed

## 2017-10-01 NOTE — DISCHARGE NOTE ADULT - HOSPITAL COURSE
61 y/o Male  PMH PUD with hx of bleeding ulcers in the past, discharged from [facility] after admission for anemia sent back to ED for generalized weakness, fatigue, exertional SOB. Pt was transfused 5U PRBC during recent admission. Had negative occult blood at that time and capsule endoscopy was decided on for further w/u. Turned in capsule 2 days ago but no results yet. Seen by GI and Hematology during recent admission, but refused bone marrow biopsy. H/H 8/18/17 12/7/40/8; H/H upon admission in ED 6.2/20.5. Not on blood thinners. Unclear source for blood loss/etiology of anemia, suspected to be GI in nature. Pt denies chest pain or syncope, states has felt worsening fatigue and SOB. Called his hematologist Dmary Huang) and advised to go to ED. Last EGD July 2017, last Colonoscopy Aug. 2017, as outpatient, S/P CT A/P as well No clear source of bleeding was identified, pt denies any NSAID use for now, no blood in stool reported, no cough, no fever, no N/V, no abdominal pain, no wt Loss, no dysuria,   No new overnight event.  No N/V/D.  Tolerating diet.    GI hemorrhage with melena- s/p EGD 9/19 with a Submucosal GE junction lesion likely responsible for anemia. EGD pathology negative for malignant cells  - s/p EUS /FNA 9/20 with path negative for malignant cells, was a reactive lymph node collected from mediastinal mass  - s/p endoscopic resection of lipoma with house GI, 9/28/17. Advanced diet as tolerated, continue PPI PO BID for at least 2 weeks, GI will follow as outpt. CT surgery evaluation for hernia repair as outpatient.

## 2017-10-01 NOTE — PROGRESS NOTE ADULT - PROVIDER SPECIALTY LIST ADULT
CT Surgery
Gastroenterology
Internal Medicine
Thoracic Surgery
Internal Medicine
Gastroenterology
Internal Medicine

## 2017-10-01 NOTE — PROGRESS NOTE ADULT - PROBLEM SELECTOR PROBLEM 3
Hypertension, unspecified type
Anemia
Hypertension, unspecified type

## 2017-10-01 NOTE — DISCHARGE NOTE ADULT - CARE PLAN
Principal Discharge DX:	Other iron deficiency anemia  Goal:	Continue Iron supplement  Instructions for follow-up, activity and diet:	You had EGD done on 9/19 with a Submucosal GE junction lesion likely responsible for anemia. EGD pathology negative for malignant cells. You had EUS/FNA 9/20 with path negative for malignant cells, was a reactive lymph node collected from mediastinal mass. You had endoscopic resection of lipoma with house GI, 9/28/17. Advanced diet as tolerated, continue Protonix PO BID for at least 2 weeks, GI will follow as out pt. CT surgery evaluation for hernia repair.  Secondary Diagnosis:	GI bleed  Instructions for follow-up, activity and diet:	You had EGD done on 9/19 with a Submucosal GE junction lesion likely responsible for anemia. EGD pathology negative for malignant cells. You had EUS /FNA 9/20 with path negative for malignant cells, was a reactive lymph node collected from mediastinal mass. You had endoscopic resection of lipoma with house GI, 9/28/17. Advanced diet as tolerated, continue Protonix PO BID for at least 2 weeks, GI will follow as out pt. CT surgery evaluation for hernia repair.  Secondary Diagnosis:	Hiatal hernia  Instructions for follow-up, activity and diet:	Follow up with PCP as outpatient for further management and treatment.  Secondary Diagnosis:	Essential hypertension  Instructions for follow-up, activity and diet:	Continue taking Losartan as prescribed. Monitor blood pressure at home. Follow up with Dr Mancia as outpatient for further management.  Secondary Diagnosis:	Anxiety  Instructions for follow-up, activity and diet:	Continue taking Effexor and Xanax as needed.

## 2017-10-01 NOTE — DISCHARGE NOTE ADULT - MEDICATION SUMMARY - MEDICATIONS TO CHANGE
I will SWITCH the dose or number of times a day I take the medications listed below when I get home from the hospital:    pantoprazole 40 mg oral delayed release tablet  -- 1 tab(s) by mouth once a day (at bedtime)    acetaminophen 325 mg oral tablet  -- 2 tab(s) by mouth every 4 hours, As Needed

## 2017-10-26 LAB — SURGICAL PATHOLOGY STUDY: SIGNIFICANT CHANGE UP

## 2018-04-24 NOTE — PROVIDER CONTACT NOTE (CRITICAL VALUE NOTIFICATION) - SITUATION
Patient h/h 6.5/ 20.6
Pt received 3u total or packed red blood cells, tolerated well. Pt H/H pre infusion 4.9, post infusion H/H Critical Lab value 6.6. Pt asymptomatic, pt is resting in bed, no complaints at this time, no SOB, CP, or dizziness. VS stable.
no discharge, no irritation, no pain, no redness, and no visual changes.

## 2018-06-01 ENCOUNTER — INPATIENT (INPATIENT)
Facility: HOSPITAL | Age: 63
LOS: 3 days | Discharge: HOME | End: 2018-06-05
Attending: GENERAL PRACTICE | Admitting: GENERAL PRACTICE
Payer: COMMERCIAL

## 2018-06-01 VITALS
TEMPERATURE: 98 F | DIASTOLIC BLOOD PRESSURE: 74 MMHG | OXYGEN SATURATION: 98 % | SYSTOLIC BLOOD PRESSURE: 146 MMHG | RESPIRATION RATE: 16 BRPM | HEART RATE: 70 BPM

## 2018-06-01 DIAGNOSIS — D64.9 ANEMIA, UNSPECIFIED: ICD-10-CM

## 2018-06-01 DIAGNOSIS — Z98.89 OTHER SPECIFIED POSTPROCEDURAL STATES: Chronic | ICD-10-CM

## 2018-06-01 DIAGNOSIS — I10 ESSENTIAL (PRIMARY) HYPERTENSION: ICD-10-CM

## 2018-06-01 DIAGNOSIS — F41.9 ANXIETY DISORDER, UNSPECIFIED: ICD-10-CM

## 2018-06-01 DIAGNOSIS — K92.2 GASTROINTESTINAL HEMORRHAGE, UNSPECIFIED: ICD-10-CM

## 2018-06-01 DIAGNOSIS — D50.0 IRON DEFICIENCY ANEMIA SECONDARY TO BLOOD LOSS (CHRONIC): ICD-10-CM

## 2018-06-01 DIAGNOSIS — F32.9 MAJOR DEPRESSIVE DISORDER, SINGLE EPISODE, UNSPECIFIED: ICD-10-CM

## 2018-06-01 LAB
ALBUMIN SERPL ELPH-MCNC: 4.4 G/DL — SIGNIFICANT CHANGE UP (ref 3.3–5)
ALP SERPL-CCNC: 47 U/L — SIGNIFICANT CHANGE UP (ref 40–120)
ALT FLD-CCNC: 21 U/L — SIGNIFICANT CHANGE UP (ref 4–41)
ANISOCYTOSIS BLD QL: SIGNIFICANT CHANGE UP
APPEARANCE UR: CLEAR — SIGNIFICANT CHANGE UP
APTT BLD: 27.8 SEC — SIGNIFICANT CHANGE UP (ref 27.5–37.4)
AST SERPL-CCNC: 17 U/L — SIGNIFICANT CHANGE UP (ref 4–40)
BASOPHILS # BLD AUTO: 0.11 K/UL — SIGNIFICANT CHANGE UP (ref 0–0.2)
BASOPHILS NFR BLD AUTO: 1.2 % — SIGNIFICANT CHANGE UP (ref 0–2)
BASOPHILS NFR SPEC: 2.7 % — HIGH (ref 0–2)
BILIRUB SERPL-MCNC: 0.2 MG/DL — SIGNIFICANT CHANGE UP (ref 0.2–1.2)
BILIRUB UR-MCNC: NEGATIVE — SIGNIFICANT CHANGE UP
BLD GP AB SCN SERPL QL: NEGATIVE — SIGNIFICANT CHANGE UP
BLOOD UR QL VISUAL: NEGATIVE — SIGNIFICANT CHANGE UP
BUN SERPL-MCNC: 17 MG/DL — SIGNIFICANT CHANGE UP (ref 7–23)
CALCIUM SERPL-MCNC: 8.9 MG/DL — SIGNIFICANT CHANGE UP (ref 8.4–10.5)
CHLORIDE SERPL-SCNC: 99 MMOL/L — SIGNIFICANT CHANGE UP (ref 98–107)
CO2 SERPL-SCNC: 23 MMOL/L — SIGNIFICANT CHANGE UP (ref 22–31)
COLOR SPEC: SIGNIFICANT CHANGE UP
CREAT SERPL-MCNC: 0.83 MG/DL — SIGNIFICANT CHANGE UP (ref 0.5–1.3)
DACRYOCYTES BLD QL SMEAR: SLIGHT — SIGNIFICANT CHANGE UP
EOSINOPHIL # BLD AUTO: 0.01 K/UL — SIGNIFICANT CHANGE UP (ref 0–0.5)
EOSINOPHIL NFR BLD AUTO: 0.1 % — SIGNIFICANT CHANGE UP (ref 0–6)
EOSINOPHIL NFR FLD: 0 % — SIGNIFICANT CHANGE UP (ref 0–6)
GIANT PLATELETS BLD QL SMEAR: PRESENT — SIGNIFICANT CHANGE UP
GLUCOSE SERPL-MCNC: 95 MG/DL — SIGNIFICANT CHANGE UP (ref 70–99)
GLUCOSE UR-MCNC: NEGATIVE — SIGNIFICANT CHANGE UP
HCT VFR BLD CALC: 28.6 % — LOW (ref 39–50)
HGB BLD-MCNC: 8.1 G/DL — LOW (ref 13–17)
HYPOCHROMIA BLD QL: SLIGHT — SIGNIFICANT CHANGE UP
IMM GRANULOCYTES # BLD AUTO: 0.04 # — SIGNIFICANT CHANGE UP
IMM GRANULOCYTES NFR BLD AUTO: 0.4 % — SIGNIFICANT CHANGE UP (ref 0–1.5)
INR BLD: 1.05 — SIGNIFICANT CHANGE UP (ref 0.88–1.17)
KETONES UR-MCNC: NEGATIVE — SIGNIFICANT CHANGE UP
LEUKOCYTE ESTERASE UR-ACNC: NEGATIVE — SIGNIFICANT CHANGE UP
LYMPHOCYTES # BLD AUTO: 1.08 K/UL — SIGNIFICANT CHANGE UP (ref 1–3.3)
LYMPHOCYTES # BLD AUTO: 12.1 % — LOW (ref 13–44)
LYMPHOCYTES NFR SPEC AUTO: 9.7 % — LOW (ref 13–44)
MCHC RBC-ENTMCNC: 18.5 PG — LOW (ref 27–34)
MCHC RBC-ENTMCNC: 28.3 % — LOW (ref 32–36)
MCV RBC AUTO: 65.4 FL — LOW (ref 80–100)
MICROCYTES BLD QL: SIGNIFICANT CHANGE UP
MONOCYTES # BLD AUTO: 0.73 K/UL — SIGNIFICANT CHANGE UP (ref 0–0.9)
MONOCYTES NFR BLD AUTO: 8.1 % — SIGNIFICANT CHANGE UP (ref 2–14)
MONOCYTES NFR BLD: 4.4 % — SIGNIFICANT CHANGE UP (ref 2–9)
MUCOUS THREADS # UR AUTO: SIGNIFICANT CHANGE UP
NEUTROPHIL AB SER-ACNC: 80.5 % — HIGH (ref 43–77)
NEUTROPHILS # BLD AUTO: 6.99 K/UL — SIGNIFICANT CHANGE UP (ref 1.8–7.4)
NEUTROPHILS NFR BLD AUTO: 78.1 % — HIGH (ref 43–77)
NITRITE UR-MCNC: NEGATIVE — SIGNIFICANT CHANGE UP
NON-SQ EPI CELLS # UR AUTO: <1 — SIGNIFICANT CHANGE UP
NRBC # FLD: 0 — SIGNIFICANT CHANGE UP
OB PNL STL: POSITIVE — SIGNIFICANT CHANGE UP
PH UR: 6 — SIGNIFICANT CHANGE UP (ref 4.6–8)
PLATELET # BLD AUTO: 318 K/UL — SIGNIFICANT CHANGE UP (ref 150–400)
PLATELET COUNT - ESTIMATE: NORMAL — SIGNIFICANT CHANGE UP
PMV BLD: 9.2 FL — SIGNIFICANT CHANGE UP (ref 7–13)
POIKILOCYTOSIS BLD QL AUTO: SLIGHT — SIGNIFICANT CHANGE UP
POLYCHROMASIA BLD QL SMEAR: SLIGHT — SIGNIFICANT CHANGE UP
POTASSIUM SERPL-MCNC: 4.4 MMOL/L — SIGNIFICANT CHANGE UP (ref 3.5–5.3)
POTASSIUM SERPL-SCNC: 4.4 MMOL/L — SIGNIFICANT CHANGE UP (ref 3.5–5.3)
PROT SERPL-MCNC: 6.9 G/DL — SIGNIFICANT CHANGE UP (ref 6–8.3)
PROT UR-MCNC: 20 MG/DL — SIGNIFICANT CHANGE UP
PROTHROM AB SERPL-ACNC: 12.1 SEC — SIGNIFICANT CHANGE UP (ref 9.8–13.1)
RBC # BLD: 4.37 M/UL — SIGNIFICANT CHANGE UP (ref 4.2–5.8)
RBC # FLD: 18.8 % — HIGH (ref 10.3–14.5)
RBC CASTS # UR COMP ASSIST: SIGNIFICANT CHANGE UP (ref 0–?)
REVIEW TO FOLLOW: YES — SIGNIFICANT CHANGE UP
RH IG SCN BLD-IMP: NEGATIVE — SIGNIFICANT CHANGE UP
SODIUM SERPL-SCNC: 134 MMOL/L — LOW (ref 135–145)
SP GR SPEC: 1.01 — SIGNIFICANT CHANGE UP (ref 1–1.04)
SQUAMOUS # UR AUTO: SIGNIFICANT CHANGE UP
UROBILINOGEN FLD QL: NORMAL MG/DL — SIGNIFICANT CHANGE UP
VARIANT LYMPHS # BLD: 2.7 % — SIGNIFICANT CHANGE UP
WBC # BLD: 8.96 K/UL — SIGNIFICANT CHANGE UP (ref 3.8–10.5)
WBC # FLD AUTO: 8.96 K/UL — SIGNIFICANT CHANGE UP (ref 3.8–10.5)
WBC UR QL: SIGNIFICANT CHANGE UP (ref 0–?)

## 2018-06-01 RX ORDER — VENLAFAXINE HCL 75 MG
37.5 CAPSULE, EXT RELEASE 24 HR ORAL AT BEDTIME
Qty: 0 | Refills: 0 | Status: DISCONTINUED | OUTPATIENT
Start: 2018-06-01 | End: 2018-06-05

## 2018-06-01 RX ORDER — PANTOPRAZOLE SODIUM 20 MG/1
40 TABLET, DELAYED RELEASE ORAL
Qty: 0 | Refills: 0 | Status: DISCONTINUED | OUTPATIENT
Start: 2018-06-01 | End: 2018-06-05

## 2018-06-01 RX ORDER — ALPRAZOLAM 0.25 MG
0.25 TABLET ORAL AT BEDTIME
Qty: 0 | Refills: 0 | Status: DISCONTINUED | OUTPATIENT
Start: 2018-06-01 | End: 2018-06-05

## 2018-06-01 RX ORDER — LOSARTAN POTASSIUM 100 MG/1
25 TABLET, FILM COATED ORAL DAILY
Qty: 0 | Refills: 0 | Status: DISCONTINUED | OUTPATIENT
Start: 2018-06-01 | End: 2018-06-05

## 2018-06-01 RX ORDER — SUCRALFATE 1 G
1 TABLET ORAL
Qty: 0 | Refills: 0 | Status: DISCONTINUED | OUTPATIENT
Start: 2018-06-01 | End: 2018-06-05

## 2018-06-01 RX ORDER — ACETAMINOPHEN 500 MG
650 TABLET ORAL EVERY 6 HOURS
Qty: 0 | Refills: 0 | Status: DISCONTINUED | OUTPATIENT
Start: 2018-06-01 | End: 2018-06-05

## 2018-06-01 RX ADMIN — Medication 650 MILLIGRAM(S): at 22:44

## 2018-06-01 RX ADMIN — Medication 1 GRAM(S): at 23:36

## 2018-06-01 RX ADMIN — Medication 37.5 MILLIGRAM(S): at 22:01

## 2018-06-01 RX ADMIN — Medication 650 MILLIGRAM(S): at 23:30

## 2018-06-01 NOTE — CONSULT NOTE ADULT - PROBLEM SELECTOR RECOMMENDATION 9
- drop in h/h from outpatient baseline 12   - transfuse PRBC x 1 unit   - occult positive  - avoid nsaids   - recommend Protonix 40mg PO BID  - recommend Carafate 1g PO QID   - check iron studies   - will plan for EGD/Colonoscopy on Monday   - regular diet - drop in h/h from outpatient baseline 12   - transfuse PRBC x 1 unit   - occult positive  - avoid nsaids   - recommend Protonix 40mg BID  - recommend Carafate 1g PO QID   - check iron studies   - will plan for EGD/Colonoscopy on Monday   - regular diet

## 2018-06-01 NOTE — ED PROVIDER NOTE - ENMT, MLM
Airway patent, Nasal mucosa clear. Mouth with normal mucosa. Throat has no vesicles, no oropharyngeal exudates and uvula is midline. Conjunctiva pale.

## 2018-06-01 NOTE — H&P ADULT - PROBLEM SELECTOR PLAN 2
Hemodynamically stable. PPI and Carafate . GI consult noted and planned for Upper Endoscopy and Colonoscopy on Monday .

## 2018-06-01 NOTE — ED PROVIDER NOTE - OBJECTIVE STATEMENT
62 y/o male with pmhx of ?GI bleed requiring blood transfusion and admission, poor historian, sent to ED by hematologist for drop in Hg from 12.6 three months ago to 8.2 drawn yesterday. Admits to fatigue x 1 month, no other complaints. 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 No fever, chills, cp, sob, palpitations, lightheadedness, syncope, abd pain, n/v/d, melena, weakness, numbness, active bleeding.

## 2018-06-01 NOTE — H&P ADULT - NEGATIVE CARDIOVASCULAR SYMPTOMS
no peripheral edema/no chest pain/no claudication/no palpitations/no orthopnea/no paroxysmal nocturnal dyspnea

## 2018-06-01 NOTE — H&P ADULT - ASSESSMENT
62yo male  with PMH of  htn, obesity ,Anxiety, depression and PUD/GI bleeds requiring blood transfusions presents from home after being told to go to the ER for low hemoglobin by his hematologist. The patient reports that over the past year his Hgb was known to be between 12-13 and yesterday was told his Hgb was 8.2 by his hematologist. Last admission in September 2017 the patient was admitted for severe anemia with Hgb 4.9 with melena and underwent an EGD 9/19 with a Submucosal GE junction lesion that was likely responsible for anemia with EGD pathology negative for malignant cells. On 9/20 he underwent EUS/FNA with path negative for malignant cells, was a reactive lymph node collected from mediastinal mass. Then on 9/28 he underwent endoscopic resection of lipoma. Since that time he reports no further episodes of melena or hematochezia and had been without any abdominal pain, nausea or vomiting. Just recently he reported increased fatigue which he attributed to increased working out at the gym and taking on more hours.

## 2018-06-01 NOTE — H&P ADULT - RS GEN PE MLT RESP DETAILS PC
no chest wall tenderness/no rales/normal/airway obstructed/no wheezes/good air movement/no rhonchi/breath sounds equal/respirations non-labored/airway patent/no subcutaneous emphysema/clear to auscultation bilaterally/no intercostal retractions

## 2018-06-01 NOTE — ED PROVIDER NOTE - PMH
Anxiety    Back pain    Gastrointestinal ulcer    HTN (hypertension)    Obesity    Seasonal allergies

## 2018-06-01 NOTE — H&P ADULT - NEGATIVE GASTROINTESTINAL SYMPTOMS
no flatulence/no melena/no diarrhea/no nausea/no vomiting/no hematochezia/no constipation/no change in bowel habits/no abdominal pain/no steatorrhea

## 2018-06-01 NOTE — ED PROVIDER NOTE - MEDICAL DECISION MAKING DETAILS
64 y/o male with pmhx of ?GI bleed requiring blood transfusion and admission, poor historian, sent to ED by hematologist for drop in Hg from 12.6 three months ago to 8.2 drawn yesterday. will check labs, H and H, type and screen, occult blood, admit vs cdu

## 2018-06-01 NOTE — ED ADULT NURSE NOTE - CHIEF COMPLAINT QUOTE
pt sent by pmd for admission for a drop in hemoglobin for 13 to 8.2 over the past two months. pt has a hx of gi tumor removal with gi bleed in the past and multiple transfusions last year. denies any abdominal pain, blood stools, bloody vomit, not on blood thinners.

## 2018-06-01 NOTE — ED PROVIDER NOTE - ATTENDING CONTRIBUTION TO CARE
ED Attending Dr. Almodovar: 64 yo male with possible GI bleed in past requiring blood transfusion, sent to ED by his hematologist due to recent drop in Hgb (baseline 12.6, currently 8.2 on labs drawn yesterday).  Pt does not generalized weakness/fatigue for 1 month but no CP/SOB, N/V/D or abdominal pain.  No blood noted in stool.  No fever.  On exam pt well appearing, in NAD, heart RRR, lungs CTAB, abd NTND, extremities without swelling, strength 5/5 in all extremities and skin without rash.

## 2018-06-01 NOTE — ED ADULT NURSE REASSESSMENT NOTE - NS ED NURSE REASSESS COMMENT FT1
15:00 06/01/2018 Hgb/Hct= 8.1/28.6 patient need blood transfusion, 1 unit of red blood cell initiated by NANCY Rosas, verified by NANCY Rich, blood transfusion protocol applied per hospital policy

## 2018-06-01 NOTE — H&P ADULT - GASTROINTESTINAL DETAILS
bowel sounds normal/soft/normal/no rebound tenderness/no distention/nontender/no bruit/no masses palpable

## 2018-06-01 NOTE — ED ADULT NURSE NOTE - ED CARDIAC RATE
feeling weakness, tiredness, patient reports he feels he has the same feeling when he had GI tumor removal in 2017

## 2018-06-01 NOTE — CONSULT NOTE ADULT - ASSESSMENT
62yo female known to our service from previous admission with h/o htn, obesity and PUD/GI bleeds requiring blood transfusions presents from home after being told to go to the ER for low hemoglobin by his hematologist.

## 2018-06-01 NOTE — ED PROVIDER NOTE - PROGRESS NOTE DETAILS
PATO Perrin - spoke with hematologist Dr. Huang, suggesting to transfuse 2 units prbc, if occult blood positive to admit and if negative can place in cdu and follow up outpatient. Rafita PGY4: Dr. Ortiz PMD requesting admission to Dr. Santos. Dr. Santos called, awaiting callback PATO Arrieta I spoke with Dr. Camp who suggested to admit to Dr. Santos. received call back from Dr. Santos and accepted ptClarisse dominguez.

## 2018-06-02 LAB
ALBUMIN SERPL ELPH-MCNC: 3.7 G/DL — SIGNIFICANT CHANGE UP (ref 3.3–5)
ALP SERPL-CCNC: 42 U/L — SIGNIFICANT CHANGE UP (ref 40–120)
ALT FLD-CCNC: 18 U/L — SIGNIFICANT CHANGE UP (ref 4–41)
AST SERPL-CCNC: 14 U/L — SIGNIFICANT CHANGE UP (ref 4–40)
BILIRUB SERPL-MCNC: < 0.2 MG/DL — LOW (ref 0.2–1.2)
BUN SERPL-MCNC: 24 MG/DL — HIGH (ref 7–23)
CALCIUM SERPL-MCNC: 8.5 MG/DL — SIGNIFICANT CHANGE UP (ref 8.4–10.5)
CHLORIDE SERPL-SCNC: 104 MMOL/L — SIGNIFICANT CHANGE UP (ref 98–107)
CO2 SERPL-SCNC: 22 MMOL/L — SIGNIFICANT CHANGE UP (ref 22–31)
CREAT SERPL-MCNC: 0.75 MG/DL — SIGNIFICANT CHANGE UP (ref 0.5–1.3)
GLUCOSE SERPL-MCNC: 91 MG/DL — SIGNIFICANT CHANGE UP (ref 70–99)
HCT VFR BLD CALC: 30 % — LOW (ref 39–50)
HCT VFR BLD CALC: 32.4 % — LOW (ref 39–50)
HGB BLD-MCNC: 8.8 G/DL — LOW (ref 13–17)
HGB BLD-MCNC: 9.5 G/DL — LOW (ref 13–17)
MCHC RBC-ENTMCNC: 20 PG — LOW (ref 27–34)
MCHC RBC-ENTMCNC: 20.1 PG — LOW (ref 27–34)
MCHC RBC-ENTMCNC: 29.3 % — LOW (ref 32–36)
MCHC RBC-ENTMCNC: 29.3 % — LOW (ref 32–36)
MCV RBC AUTO: 68.2 FL — LOW (ref 80–100)
MCV RBC AUTO: 68.5 FL — LOW (ref 80–100)
NRBC # FLD: 0 — SIGNIFICANT CHANGE UP
NRBC # FLD: 0 — SIGNIFICANT CHANGE UP
PLATELET # BLD AUTO: 286 K/UL — SIGNIFICANT CHANGE UP (ref 150–400)
PLATELET # BLD AUTO: 319 K/UL — SIGNIFICANT CHANGE UP (ref 150–400)
PMV BLD: 9.6 FL — SIGNIFICANT CHANGE UP (ref 7–13)
PMV BLD: 9.8 FL — SIGNIFICANT CHANGE UP (ref 7–13)
POTASSIUM SERPL-MCNC: 4.7 MMOL/L — SIGNIFICANT CHANGE UP (ref 3.5–5.3)
POTASSIUM SERPL-SCNC: 4.7 MMOL/L — SIGNIFICANT CHANGE UP (ref 3.5–5.3)
PROT SERPL-MCNC: 6.1 G/DL — SIGNIFICANT CHANGE UP (ref 6–8.3)
RBC # BLD: 4.38 M/UL — SIGNIFICANT CHANGE UP (ref 4.2–5.8)
RBC # BLD: 4.75 M/UL — SIGNIFICANT CHANGE UP (ref 4.2–5.8)
RBC # FLD: 20.1 % — HIGH (ref 10.3–14.5)
RBC # FLD: 20.3 % — HIGH (ref 10.3–14.5)
SODIUM SERPL-SCNC: 138 MMOL/L — SIGNIFICANT CHANGE UP (ref 135–145)
WBC # BLD: 6.56 K/UL — SIGNIFICANT CHANGE UP (ref 3.8–10.5)
WBC # BLD: 8.34 K/UL — SIGNIFICANT CHANGE UP (ref 3.8–10.5)
WBC # FLD AUTO: 6.56 K/UL — SIGNIFICANT CHANGE UP (ref 3.8–10.5)
WBC # FLD AUTO: 8.34 K/UL — SIGNIFICANT CHANGE UP (ref 3.8–10.5)

## 2018-06-02 RX ADMIN — Medication 650 MILLIGRAM(S): at 22:23

## 2018-06-02 RX ADMIN — Medication 650 MILLIGRAM(S): at 13:00

## 2018-06-02 RX ADMIN — Medication 1 GRAM(S): at 21:30

## 2018-06-02 RX ADMIN — Medication 650 MILLIGRAM(S): at 21:30

## 2018-06-02 RX ADMIN — Medication 1 GRAM(S): at 11:03

## 2018-06-02 RX ADMIN — PANTOPRAZOLE SODIUM 40 MILLIGRAM(S): 20 TABLET, DELAYED RELEASE ORAL at 06:50

## 2018-06-02 RX ADMIN — LOSARTAN POTASSIUM 25 MILLIGRAM(S): 100 TABLET, FILM COATED ORAL at 07:08

## 2018-06-02 RX ADMIN — Medication 1 GRAM(S): at 06:51

## 2018-06-02 RX ADMIN — Medication 1 GRAM(S): at 17:08

## 2018-06-02 RX ADMIN — Medication 37.5 MILLIGRAM(S): at 21:30

## 2018-06-02 RX ADMIN — PANTOPRAZOLE SODIUM 40 MILLIGRAM(S): 20 TABLET, DELAYED RELEASE ORAL at 17:08

## 2018-06-02 RX ADMIN — Medication 650 MILLIGRAM(S): at 12:27

## 2018-06-02 NOTE — PROGRESS NOTE ADULT - PROBLEM SELECTOR PLAN 1
- occult positive  - trend cbc  - transfuse prn to keep Hgb > 8  - Avoid NSAIDs  - cont protonix 40mg BID  - cont carafate 1g QID  - maalox prn   - regular diet today   - clear diet tomorrow in preparation for EGD/Colonoscopy on Monday

## 2018-06-03 LAB
FERRITIN SERPL-MCNC: 3.79 NG/ML — LOW (ref 30–400)
FOLATE SERPL-MCNC: 11.9 NG/ML — SIGNIFICANT CHANGE UP (ref 4.7–20)
HCT VFR BLD CALC: 31.3 % — LOW (ref 39–50)
HGB BLD-MCNC: 9.1 G/DL — LOW (ref 13–17)
IRON SATN MFR SERPL: 13 UG/DL — LOW (ref 45–165)
IRON SATN MFR SERPL: 431 UG/DL — SIGNIFICANT CHANGE UP (ref 155–535)
MCHC RBC-ENTMCNC: 19.9 PG — LOW (ref 27–34)
MCHC RBC-ENTMCNC: 29.1 % — LOW (ref 32–36)
MCV RBC AUTO: 68.5 FL — LOW (ref 80–100)
NRBC # FLD: 0 — SIGNIFICANT CHANGE UP
PLATELET # BLD AUTO: 295 K/UL — SIGNIFICANT CHANGE UP (ref 150–400)
PMV BLD: 9.5 FL — SIGNIFICANT CHANGE UP (ref 7–13)
RBC # BLD: 4.57 M/UL — SIGNIFICANT CHANGE UP (ref 4.2–5.8)
RBC # FLD: 21.1 % — HIGH (ref 10.3–14.5)
UIBC SERPL-MCNC: 417.5 UG/DL — HIGH (ref 110–370)
WBC # BLD: 7.7 K/UL — SIGNIFICANT CHANGE UP (ref 3.8–10.5)
WBC # FLD AUTO: 7.7 K/UL — SIGNIFICANT CHANGE UP (ref 3.8–10.5)

## 2018-06-03 RX ORDER — SOD SULF/SODIUM/NAHCO3/KCL/PEG
1 SOLUTION, RECONSTITUTED, ORAL ORAL EVERY 4 HOURS
Qty: 0 | Refills: 0 | Status: COMPLETED | OUTPATIENT
Start: 2018-06-03 | End: 2018-06-03

## 2018-06-03 RX ADMIN — Medication 1 GRAM(S): at 06:07

## 2018-06-03 RX ADMIN — Medication 1 LITER(S): at 21:48

## 2018-06-03 RX ADMIN — LOSARTAN POTASSIUM 25 MILLIGRAM(S): 100 TABLET, FILM COATED ORAL at 06:07

## 2018-06-03 RX ADMIN — PANTOPRAZOLE SODIUM 40 MILLIGRAM(S): 20 TABLET, DELAYED RELEASE ORAL at 15:59

## 2018-06-03 RX ADMIN — PANTOPRAZOLE SODIUM 40 MILLIGRAM(S): 20 TABLET, DELAYED RELEASE ORAL at 06:08

## 2018-06-03 RX ADMIN — Medication 10 MILLIGRAM(S): at 15:08

## 2018-06-03 RX ADMIN — Medication 37.5 MILLIGRAM(S): at 21:49

## 2018-06-03 RX ADMIN — Medication 1 GRAM(S): at 11:11

## 2018-06-03 RX ADMIN — Medication 1 GRAM(S): at 23:55

## 2018-06-03 RX ADMIN — Medication 10 MILLIGRAM(S): at 19:12

## 2018-06-03 RX ADMIN — Medication 1 GRAM(S): at 15:59

## 2018-06-03 RX ADMIN — Medication 1 LITER(S): at 17:07

## 2018-06-03 NOTE — PROGRESS NOTE ADULT - PROBLEM SELECTOR PLAN 1
- occult positive  - trend cbc  - transfuse prn to keep Hgb > 8  - Avoid NSAIDs  - cont protonix 40mg BID  - cont carafate 1g QID  - maalox prn   - clear diet today   - bowel prep ordered  - NPO p MN for EGD/Colonoscopy tomorrow

## 2018-06-04 ENCOUNTER — RESULT REVIEW (OUTPATIENT)
Age: 63
End: 2018-06-04

## 2018-06-04 ENCOUNTER — TRANSCRIPTION ENCOUNTER (OUTPATIENT)
Age: 63
End: 2018-06-04

## 2018-06-04 LAB
BUN SERPL-MCNC: 14 MG/DL — SIGNIFICANT CHANGE UP (ref 7–23)
CALCIUM SERPL-MCNC: 8.8 MG/DL — SIGNIFICANT CHANGE UP (ref 8.4–10.5)
CHLORIDE SERPL-SCNC: 102 MMOL/L — SIGNIFICANT CHANGE UP (ref 98–107)
CO2 SERPL-SCNC: 24 MMOL/L — SIGNIFICANT CHANGE UP (ref 22–31)
CREAT SERPL-MCNC: 0.81 MG/DL — SIGNIFICANT CHANGE UP (ref 0.5–1.3)
GLUCOSE SERPL-MCNC: 83 MG/DL — SIGNIFICANT CHANGE UP (ref 70–99)
HCT VFR BLD CALC: 31.4 % — LOW (ref 39–50)
HGB BLD-MCNC: 9.3 G/DL — LOW (ref 13–17)
MCHC RBC-ENTMCNC: 19.6 PG — LOW (ref 27–34)
MCHC RBC-ENTMCNC: 29.6 % — LOW (ref 32–36)
MCV RBC AUTO: 66.2 FL — LOW (ref 80–100)
NRBC # FLD: 0 — SIGNIFICANT CHANGE UP
PLATELET # BLD AUTO: 300 K/UL — SIGNIFICANT CHANGE UP (ref 150–400)
PMV BLD: 9.7 FL — SIGNIFICANT CHANGE UP (ref 7–13)
POTASSIUM SERPL-MCNC: 4.2 MMOL/L — SIGNIFICANT CHANGE UP (ref 3.5–5.3)
POTASSIUM SERPL-SCNC: 4.2 MMOL/L — SIGNIFICANT CHANGE UP (ref 3.5–5.3)
RBC # BLD: 4.74 M/UL — SIGNIFICANT CHANGE UP (ref 4.2–5.8)
RBC # FLD: 21.3 % — HIGH (ref 10.3–14.5)
SODIUM SERPL-SCNC: 139 MMOL/L — SIGNIFICANT CHANGE UP (ref 135–145)
WBC # BLD: 6.42 K/UL — SIGNIFICANT CHANGE UP (ref 3.8–10.5)
WBC # FLD AUTO: 6.42 K/UL — SIGNIFICANT CHANGE UP (ref 3.8–10.5)

## 2018-06-04 PROCEDURE — 99253 IP/OBS CNSLTJ NEW/EST LOW 45: CPT

## 2018-06-04 PROCEDURE — 88312 SPECIAL STAINS GROUP 1: CPT | Mod: 26

## 2018-06-04 PROCEDURE — 88305 TISSUE EXAM BY PATHOLOGIST: CPT | Mod: 26

## 2018-06-04 RX ADMIN — Medication 37.5 MILLIGRAM(S): at 21:32

## 2018-06-04 RX ADMIN — Medication 1 GRAM(S): at 23:25

## 2018-06-04 RX ADMIN — Medication 1 GRAM(S): at 12:40

## 2018-06-04 RX ADMIN — Medication 1 GRAM(S): at 17:53

## 2018-06-04 RX ADMIN — PANTOPRAZOLE SODIUM 40 MILLIGRAM(S): 20 TABLET, DELAYED RELEASE ORAL at 17:53

## 2018-06-04 NOTE — DISCHARGE NOTE ADULT - PLAN OF CARE
stable Follow-up with your outpatient provider if further treatment is warranted. Monitor for signs/symptoms indicating worsening of disease, such as, easy bleeding/bruising, pale skin, fatigue, dizziness, increased heart rate, or chest pain. Continue medications. Follow up with your PCP for further evaluation and management. Please call to make an appointment within 1-2 weeks of discharge. Continue current blood pressure medication regimen as directed. Monitor for any visual changes, headaches or dizziness.  Monitor blood pressure regularly, hold BP meds if SBP <110 or HR <60.  Follow up with your PCP for further management for high blood pressure, please call to make appointment within 1 week of discharge Please follow up with Dr. Santo on June 19th Follow-up with your outpatient provider if further treatment is warranted. Monitor for signs/symptoms indicating worsening of disease, such as, easy bleeding/bruising, pale skin, fatigue, dizziness, increased heart rate, or chest pain. Please follow up with outpatient GI Dr. Elizalde for repeat colonoscopy. Please follow up with thoracic surgery Dr. Santo on June 19th

## 2018-06-04 NOTE — DISCHARGE NOTE ADULT - CARE PROVIDERS DIRECT ADDRESSES
[email or web address]

## 2018-06-04 NOTE — DISCHARGE NOTE ADULT - HOSPITAL COURSE
62yo male  with PMH of  htn, obesity ,Anxiety, depression and PUD/GI bleeds requiring blood transfusions presents from home after being told to go to the ER for low hemoglobin by his hematologist. The patient reports that over the past year his Hgb was known to be between 12-13 and yesterday was told his Hgb was 8.2 by his hematologist. Last admission in September 2017 the patient was admitted for severe anemia with Hgb 4.9 with melena and underwent an EGD 9/19 with a Submucosal GE junction lesion that was likely responsible for anemia with EGD pathology negative for malignant cells. On 9/20 he underwent EUS/FNA with path negative for malignant cells, was a reactive lymph node collected from mediastinal mass. Then on 9/28 he underwent endoscopic resection of lipoma. Since that time he reports no further episodes of melena or hematochezia and had been without any abdominal pain, nausea or vomiting. Just recently he reported increased fatigue which he attributed to increased working out at the gym and taking on more hours.    Iron deficiency anemia due to chronic blood loss:  - 2 units PRBC (6/1/18)  - GI consulted    - PPI and Carafate  - colonoscopy 6/4: Preparation of the colon was poor. Stool in the entire examined colon. Diverticulosis in the sigmoid colon and in the descending colon. Recommend outpatient follow up and repeat colonoscopy   - Endoscopy 6/4: Large hiatus hernia. Non-bleeding erosive gastropathy. Normal examined duodenum. Biopsied.  - Thoracic sx consulted for hiatal hernia-- recommend outpatient follow up with Dr. Santo. Scheduled for 6/19/18   - Hgb/Hct stable, no active signs of bleeding.                            Continue current blood pressure medication regimen as directed. Monitor for any visual changes, headaches or dizziness.  Monitor blood pressure regularly.  Follow up with your PCP for further management for high blood pressure, please call to make appointment within 1 week of discharge   - Losartan 25mg with parameters.     Anxiety  - Xanax PRN.     Depression  - Stable . Effexor 37.5mg daily.     discussed with medical attending, pt medically stable for discharge. 64yo male  with PMH of  htn, obesity ,Anxiety, depression and PUD/GI bleeds requiring blood transfusions presents from home after being told to go to the ER for low hemoglobin by his hematologist. The patient reports that over the past year his Hgb was known to be between 12-13 and yesterday was told his Hgb was 8.2 by his hematologist. Last admission in September 2017 the patient was admitted for severe anemia with Hgb 4.9 with melena and underwent an EGD 9/19 with a Submucosal GE junction lesion that was likely responsible for anemia with EGD pathology negative for malignant cells. On 9/20 he underwent EUS/FNA with path negative for malignant cells, was a reactive lymph node collected from mediastinal mass. Then on 9/28 he underwent endoscopic resection of lipoma. Since that time he reports no further episodes of melena or hematochezia and had been without any abdominal pain, nausea or vomiting. Just recently he reported increased fatigue which he attributed to increased working out at the gym and taking on more hours.    Iron deficiency anemia due to chronic blood loss:  - 2 units PRBC (6/1/18)  - GI consulted    - Continue PPI and Carafate  - colonoscopy 6/4: Preparation of the colon was poor. Stool in the entire examined colon. Diverticulosis in the sigmoid colon and in the descending colon. Recommend outpatient follow up and repeat colonoscopy   - Endoscopy 6/4: Large hiatus hernia. Non-bleeding erosive gastropathy. Normal examined duodenum. Biopsied.  - Thoracic sx consulted for hiatal hernia-- recommend outpatient follow up with Dr. Santo. Scheduled for 6/19/18   - Hgb/Hct stable, no active signs of bleeding.   - outpatient repeat colonoscopy with your private gastroenterologist, Dr. Elizalde, in [place]- Please call to make appointment at [phone]                         hypertension   - Losartan 25mg with parameters.     Anxiety  - Xanax PRN.     Depression  - Stable . Effexor 37.5mg daily.     Hiatal hernia  -Evaluated by Thoracic surgery, recommend outpatient follow up with Dr. Santo.     discussed with medical attending, pt medically stable for discharge. 62yo male  with PMH of  htn, obesity ,Anxiety, depression and PUD/GI bleeds requiring blood transfusions presents from home after being told to go to the ER for low hemoglobin by his hematologist. The patient reports that over the past year his Hgb was known to be between 12-13 and yesterday was told his Hgb was 8.2 by his hematologist. Last admission in September 2017 the patient was admitted for severe anemia with Hgb 4.9 with melena and underwent an EGD 9/19 with a Submucosal GE junction lesion that was likely responsible for anemia with EGD pathology negative for malignant cells. On 9/20 he underwent EUS/FNA with path negative for malignant cells, was a reactive lymph node collected from mediastinal mass. Then on 9/28 he underwent endoscopic resection of lipoma. Since that time he reports no further episodes of melena or hematochezia and had been without any abdominal pain, nausea or vomiting. Just recently he reported increased fatigue which he attributed to increased working out at the gym and taking on more hours.    Iron deficiency anemia due to chronic blood loss:  - 2 units PRBC (6/1/18)  - GI consulted    - Continue PPI and Carafate  - colonoscopy 6/4: Preparation of the colon was poor. Stool in the entire examined colon. Diverticulosis in the sigmoid colon and in the descending colon. Recommend outpatient follow up and repeat colonoscopy   - Endoscopy 6/4: Large hiatus hernia. Non-bleeding erosive gastropathy. Normal examined duodenum. Biopsied.  - Thoracic sx consulted for hiatal hernia-- recommend outpatient follow up with Dr. Santo. Scheduled for 6/19/18   - Hgb/Hct stable, no active signs of bleeding.   - outpatient repeat colonoscopy with your private gastroenterologist, Dr. Elizalde, in [place]- Please call to make appointment at [phone]                         hypertension   - Losartan 25mg with parameters.     Anxiety  - Xanax PRN. Hold for oversedation    Depression  - Stable Denies any SI/HI . Cont. Effexor 37.5mg daily.     Hiatal hernia  -Evaluated by Thoracic surgery, recommend outpatient follow up with Dr. Santo.     discussed with medical attending, pt medically stable for discharge.

## 2018-06-04 NOTE — CHART NOTE - NSCHARTNOTEFT_GEN_A_CORE
Per Dr. Santos and thoracic sx team pt medically stable for d/c- discussed Egd findings and plan of care with pt-- pt is insistent on staying in hospital tonight and would like to know hgb/hct level in am. Will continue to monitor.

## 2018-06-04 NOTE — DISCHARGE NOTE ADULT - CARE PLAN
Principal Discharge DX:	Anemia  Goal:	stable  Assessment and plan of treatment:	Follow-up with your outpatient provider if further treatment is warranted. Monitor for signs/symptoms indicating worsening of disease, such as, easy bleeding/bruising, pale skin, fatigue, dizziness, increased heart rate, or chest pain.  Secondary Diagnosis:	Depression  Assessment and plan of treatment:	Continue medications. Follow up with your PCP for further evaluation and management. Please call to make an appointment within 1-2 weeks of discharge.  Secondary Diagnosis:	HTN (hypertension)  Assessment and plan of treatment:	Continue current blood pressure medication regimen as directed. Monitor for any visual changes, headaches or dizziness.  Monitor blood pressure regularly, hold BP meds if SBP <110 or HR <60.  Follow up with your PCP for further management for high blood pressure, please call to make appointment within 1 week of discharge  Secondary Diagnosis:	Anxiety  Assessment and plan of treatment:	Continue medications. Follow up with your PCP for further evaluation and management. Please call to make an appointment within 1-2 weeks of discharge.  Secondary Diagnosis:	Hiatal hernia  Assessment and plan of treatment:	Please follow up with Dr. Santo on June 19th Principal Discharge DX:	Anemia  Goal:	stable  Assessment and plan of treatment:	Follow-up with your outpatient provider if further treatment is warranted. Monitor for signs/symptoms indicating worsening of disease, such as, easy bleeding/bruising, pale skin, fatigue, dizziness, increased heart rate, or chest pain. Please follow up with outpatient GI Dr. Elizalde for repeat colonoscopy.  Secondary Diagnosis:	Depression  Assessment and plan of treatment:	Continue medications. Follow up with your PCP for further evaluation and management. Please call to make an appointment within 1-2 weeks of discharge.  Secondary Diagnosis:	HTN (hypertension)  Assessment and plan of treatment:	Continue current blood pressure medication regimen as directed. Monitor for any visual changes, headaches or dizziness.  Monitor blood pressure regularly, hold BP meds if SBP <110 or HR <60.  Follow up with your PCP for further management for high blood pressure, please call to make appointment within 1 week of discharge  Secondary Diagnosis:	Anxiety  Assessment and plan of treatment:	Continue medications. Follow up with your PCP for further evaluation and management. Please call to make an appointment within 1-2 weeks of discharge.  Secondary Diagnosis:	Hiatal hernia  Assessment and plan of treatment:	Please follow up with thoracic surgery Dr. Santo on June 19th

## 2018-06-04 NOTE — DISCHARGE NOTE ADULT - PROVIDER TOKENS
ANA:'[id number]' ANA:'[id number]',ANA:'[id number]' TOKEN:'[id number]',TOKEN:'[id number]',ANA:'[id number]'

## 2018-06-04 NOTE — DISCHARGE NOTE ADULT - PATIENT PORTAL LINK FT
You can access the [facility] Patient Portal, offered by [facility], by registering with the following website: [email or web address]

## 2018-06-04 NOTE — DISCHARGE NOTE ADULT - CARE PROVIDER_API CALL
Luis Santo), Thoracic Surgery  [address] [place] [address]  Phone: [phone]  Fax: [phone] Luis Santo), Thoracic Surgery  [address] [address]  Phone: [phone]  Fax: [phone]    Sánchez Ortiz), Cardiovascular Disease; Internal Medicine  [address] [place] [address]  Phone: [phone]  Fax: [phone] Luis Santo), Thoracic Surgery  [address] [address]  Phone: [phone]  Fax: [phone]    Sánchez Ortiz), Cardiovascular Disease; Internal Medicine  [address] [place] [address]  Phone: [phone]  Fax: [phone]    Kip Elizalde), Gastroenterology; Internal Medicine  [address] [place] [address]  Phone: [phone]  Fax: [phone]

## 2018-06-04 NOTE — DISCHARGE NOTE ADULT - MEDICATION SUMMARY - MEDICATIONS TO CHANGE
I will SWITCH the dose or number of times a day I take the medications listed below when I get home from the hospital:    pantoprazole 40 mg oral delayed release tablet  -- 1 tab(s) by mouth once a day (at bedtime)

## 2018-06-04 NOTE — CONSULT NOTE ADULT - SUBJECTIVE AND OBJECTIVE BOX
Chief Complaint:  Patient is a 63y old  Male who presents with a chief complaint of   Obesity  Back pain  HTN (hypertension)  Gastrointestinal ulcer  Anxiety  Seasonal allergies  Depression  H/O inguinal hernia repair    HPI:    64yo female known to our service from previous admission with h/o htn, obesity and PUD/GI bleeds requiring blood transfusions presents from home after being told to go to the ER for low hemoglobin by his hematologist. The patient reports that over the past year his Hgb was known to be between 12-13 and yesterday was told his Hgb was 8.2 by his hematologist. Last admission in September 2017 the patient was admitted for severe anemia with Hgb 4.9 with melena and underwent an EGD 9/19 with a Submucosal GE junction lesion that was likely responsible for anemia with EGD pathology negative for malignant cells. On 9/20 he underwent EUS/FNA with path negative for malignant cells, was a reactive lymph node collected from mediastinal mass. Then on 9/28 he underwent endoscopic resection of lipoma. Since that time he reports no further episodes of melena or hematochezia and had been without any abdominal pain, nausea or vomiting. Just recently he reported increased fatigue which he attributed to increased working out at the gym and taking on more hours. He reports he has been having "light brown stools" and is without n/v/d/c, abdominal pain, melena or brbpr. His last clean colonoscopy was in 2014 with sigmoid tubular adenoma.       No Known Allergies            FAMILY HISTORY:  Family history of liver cancer (Father)  Family history of colon cancer (Mother)        Review of Systems:    General:  No wt loss, fevers, chills, night sweats, fatigue  Eyes:  Good vision, no reported pain  ENT:  No sore throat, pain, runny nose, dysphagia  CV:  No pain, palpitations, no lightheadedness  Resp:  No dyspnea, cough, tachypnea, wheezing  GI: as above  :  No pain, bleeding, incontinence, nocturia  Muscle:  No pain, weakness  Neuro:  No weakness, tingling, memory problems  Psych:  No fatigue, insomnia, mood problems, depression  Endocrine:  No polyuria, polydypsia, cold/heat intolerance  Heme:  No petechiae, ecchymosis, easy bruisability  Skin:  No rash, tattoos, scars, edema    Relevant Family History:   n/c    Relevant Social History: n/c      Physical Exam:    Vital Signs:  Vital Signs Last 24 Hrs  T(C): 37.1 (01 Jun 2018 11:13), Max: 37.1 (01 Jun 2018 11:13)  T(F): 98.7 (01 Jun 2018 11:13), Max: 98.7 (01 Jun 2018 11:13)  HR: 58 (01 Jun 2018 14:43) (58 - 72)  BP: 125/50 (01 Jun 2018 14:43) (112/50 - 146/74)  BP(mean): --  RR: 16 (01 Jun 2018 14:43) (16 - 16)  SpO2: 99% (01 Jun 2018 14:43) (98% - 100%)  Daily     Daily     General:  Appears stated age, well-groomed, nad  HEENT:  NC/AT,  conjunctivae clear and pink, no thyromegaly, nodules, adenopathy, no JVD  Chest:  Full & symmetric excursion, no increased effort, breath sounds clear  Cardiovascular:  Regular rhythm, S1, S2, no murmur/rub/S3/S4, no abdominal bruit, no edema  Abdomen:  Soft, non-tender, non-distended, normoactive bowel sounds,  no masses ,no hepatosplenomeagaly, no signs of chronic liver disease  Extremities:  no cyanosis,clubbing or edema  Skin:  No rash/erythema/ecchymoses/petechiae/wounds/abscess/warm/dry  Neuro/Psych:  A&Ox3  , no asterixis, no tremor, no encephalopathy    Laboratory:                            8.1    8.96  )-----------( 318      ( 01 Jun 2018 11:00 )             28.6     06-01    134<L>  |  99  |  17  ----------------------------<  95  4.4   |  23  |  0.83    Ca    8.9      01 Jun 2018 11:00    TPro  6.9  /  Alb  4.4  /  TBili  0.2  /  DBili  x   /  AST  17  /  ALT  21  /  AlkPhos  47  06-01    LIVER FUNCTIONS - ( 01 Jun 2018 11:00 )  Alb: 4.4 g/dL / Pro: 6.9 g/dL / ALK PHOS: 47 u/L / ALT: 21 u/L / AST: 17 u/L / GGT: x           PT/INR - ( 01 Jun 2018 11:00 )   PT: 12.1 SEC;   INR: 1.05          PTT - ( 01 Jun 2018 11:00 )  PTT:27.8 SEC      Imaging:    < from: Upper Endoscopy (09.28.17 @ 15:46) >    [facility]  _______________________________________________________________________________  Patient Name: Kirt Silverio         Procedure Date: 9/28/2017 3:46 PM  MRN: [id number]                     Account Number: [id number]  YOB: 1955               Admit Type: Inpatient  Room: [place] [address]                         Gender: Male  Attending MD: YUSEF GANNON MD        _______________________________________________________________________________     Procedure:           Upper GI endoscopy  Indications:         Esophageal tumor of uncertain behavior  Providers:           YUSEF GANNON MD  Medicines:           Monitored Anesthesia Care  Complications:       No immediate complications.  Procedure:After obtaining informed consent, the endoscope was                        passed under direct vision. Throughout the procedure,                        the patient's blood pressure, pulse, and oxygen                        saturations were monitored continuously. The Endoscope                        was introduced through the mouth, and advanced to the                        second part of duodenum.                                                                                   Findings:       A large, submucosal mass with no bleeding and with stigmata of recent        bleeding was found at the gastroesophageal junction.       The mass was noted in the anterior aspect of the GE junction and within        the hernai sac which was measured na 6 cm in length.       The mass was ulcerated and had evidence of recent bleeding. It appeared        to be a non mucosal lesion. EUS was eprformed.       A round intramural (subepithelial) lesion was found in the        gastroesophageal junction. It measured 3-4 cm in diameter and was well        within the submucosal layer (Layer 3) with no evidence of MP involvement.       The lesion was hyperechoic and its EUS apeparance could be consistent        with a lipoma, however given its eprisstentbleeding liekly from        emchanical prolapse, an intact 4th layer, and the endophytic almost        pedunculated appearance the decision was amde to remove it.       In the retroflexed psotion a large snare was able to be placed around        the lesion. The lesion was then resected using a dry cut current (Effect        4 100 cabrera)       It was then obvious that it was a lipoma and the majority was removed in        one pass. A small amount of lipomatous tissue was resected a clean abse     was achieved.       There was no bleeding or perforation.                                                                                   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                       - No specimens collected.  Recommendation:      - Return patient to [facility] for ongoing care.                                                                                   Attending Participation:       I personally performed the entire procedure.                                                                                     _________________  YUSEF GANNON MD  9/28/2017 7:28:04 PM  This report has been signed electronically.  Number of Addenda: 0    Note Initiated On: 9/28/2017 3:46 PM    < end of copied text >
HPI:  64yo male  with PMH of  htn, obesity ,Anxiety, depression and PUD/GI bleeds requiring blood transfusions presents from home after being told to go to the ER for low hemoglobin by his hematologist. The patient reports that over the past year his Hgb was known to be between 12-13 and yesterday was told his Hgb was 8.2 by his hematologist. Last admission in September 2017 the patient was admitted for severe anemia with Hgb 4.9 with melena and underwent an EGD 9/19 with a Submucosal GE junction lesion that was likely responsible for anemia with EGD pathology negative for malignant cells. On 9/20 he underwent EUS/FNA with path negative for malignant cells, was a reactive lymph node collected from mediastinal mass. Then on 9/28 he underwent endoscopic resection of lipoma. Since that time he reports no further episodes of melena or hematochezia and had been without any abdominal pain, nausea or vomiting. Just recently he reported increased fatigue which he attributed to increased working out at the gym and taking on more hours. (01 Jun 2018 19:43) While inhouse pt received blood transfusions and had EGD, colonoscopy with GI 6/4/18      PAST MEDICAL & SURGICAL HISTORY:  Obesity  Back pain  HTN (hypertension)  Gastrointestinal ulcer  Anxiety  Seasonal allergies  H/O inguinal hernia repair      REVIEW OF SYSTEMS      General:No Weight change/ + Fatigue/ HA/Dizzy	    Skin/Breast: No Rashes/ Lesions/ Masses  	  Ophthalmologic: No Blurry vision/ Glaucoma/ Blindness  	  ENMT: No Hearing loss/ Drainage/ Lesions	    Respiratory and Thorax: No Cough/ Wheezing/ SOB/ Hemoptysis/ Sputum production  	  Cardiovascular: No Chest pain/ Palpitations/ Diaphoresis/ + WAITE    Gastrointestinal: No Nausea/ Vomiting/ Constipation/ Appetite Change	    Genitourinary: No Heamturia/ Dysuria/ Frequency change/ Impotence	    Musculoskeletal: No Pain/ + Weakness/ Claudication	    Neurological: No Seizures/ TIA/CVA/ Parastesias	    Psychiatric: No Dementia/ Depression/ SI/HI	    Hematology/Lymphatics: No hx of bleeding/ Edema	    Endocrine:	No Hyperglycemia/ Hypoglycemia    Allergic/Immunologic:	 No Anaphylaxis/ Intolerance/ Recent illnesses    MEDICATIONS  (STANDING):  losartan 25 milliGRAM(s) Oral daily  pantoprazole    Tablet 40 milliGRAM(s) Oral two times a day  sucralfate 1 Gram(s) Oral four times a day  venlafaxine XR. 37.5 milliGRAM(s) Oral at bedtime    MEDICATIONS  (PRN):  acetaminophen   Tablet. 650 milliGRAM(s) Oral every 6 hours PRN Moderate Pain (4 - 6)  ALPRAZolam 0.25 milliGRAM(s) Oral at bedtime PRN Anxiety      Allergies    No Known Allergies    Intolerances        SOCIAL HISTORY:  Occupation:   Smoking Hx: 1/2 ppd  Etoh Hx: drinks 3x weekly  IVDA Hx: denies    FAMILY HISTORY:    unless noted, no significant family hx with Mother, Father, Siblings    Vital Signs Last 24 Hrs  T(C): 36.6 (04 Jun 2018 06:13), Max: 36.6 (03 Jun 2018 21:27)  T(F): 97.9 (04 Jun 2018 06:13), Max: 97.9 (04 Jun 2018 06:13)  HR: 58 (04 Jun 2018 06:13) (57 - 58)  BP: 116/73 (04 Jun 2018 06:13) (103/61 - 116/73)  BP(mean): --  RR: 18 (04 Jun 2018 06:13) (18 - 18)  SpO2: 95% (04 Jun 2018 06:13) (95% - 98%)    General: WN/WD NAD  Neurology: Awake, nonfocal, RODAS x 4  Eyes: Scleras clear, PERRLA/ EOMI, Gross vision intact  ENT:Gross hearing intact, grossly patent pharynx, no stridor  Neck: Neck supple, trachea midline, No JVD,   Respiratory: CTA B/L, No wheezing, rales, rhonchi  CV: RRR, S1S2, no murmurs, rubs or gallops  Abdominal: Soft, NT, ND +BS,   Extremities: No edema, + peripheral pulses  Skin: No Rashes, Hematoma, Ecchymosis  Lymphatic: No Neck, axilla, groin LAD  Psych: Oriented x 3, normal affect      LABS:                        9.3    6.42  )-----------( 300      ( 04 Jun 2018 05:45 )             31.4     06-04    139  |  102  |  14  ----------------------------<  83  4.2   |  24  |  0.81    Ca    8.8      04 Jun 2018 05:45        EGD/Colonoscopy: + hiatal hernia       RADIOLOGY & ADDITIONAL STUDIES:    ASSESSMENT:   [facility] MEDICAL & SURGICAL HISTORY:  Obesity  Back pain  HTN (hypertension)  Gastrointestinal ulcer  Anxiety  Seasonal allergies  H/O inguinal hernia repair  HEALTH ISSUES - PROBLEM Dx:  Depression: Depression  Anxiety: Anxiety  GI bleed: GI bleed  Iron deficiency anemia due to chronic blood loss: Iron deficiency anemia due to chronic blood loss  HTN (hypertension): HTN (hypertension)  Anemia: Anemia    62 yo male presented with symptomatic anemia s/p blood transfusions and EGD/colonoscopy with GI today. Pt found to have hiatal hernia.      HEALTH ISSUES - R/O PROBLEM Dx: hiatal hernia      PLAN:  case d/w thoracic surgeon Dr Luis Santo  pt can likely be discharged home to continue work up and return for surgical intervention of hiatal hernia in the future  continue care per primary medical and GI teams  please contact with concerns    Steffanie WHYTE [id number]

## 2018-06-04 NOTE — DISCHARGE NOTE ADULT - ADDITIONAL INSTRUCTIONS
please follow with thoracic surgeon Dr Luis Santo to evaluate hiatal hernia  call  to confirm appointment; you can see him on 6/19/18 at 10:30am please follow with thoracic surgeon Dr Luis Santo to evaluate hiatal hernia  call  to confirm appointment; you can see him on 6/19/18 at 10:30am    please follow up with your primary care provider within 1 week of discharge from hospital. please follow with thoracic surgeon Dr Luis Santo to evaluate hiatal hernia  call  to confirm appointment; you can see him on 6/19/18 at 10:30am    outpatient repeat colonoscopy with your private gastroenterologist, Dr. Elizalde, in [place]- Please call to make appointment at [phone]    please follow up with your primary care provider within 1 week of discharge from hospital.

## 2018-06-05 VITALS
RESPIRATION RATE: 18 BRPM | HEART RATE: 64 BPM | DIASTOLIC BLOOD PRESSURE: 71 MMHG | TEMPERATURE: 98 F | SYSTOLIC BLOOD PRESSURE: 119 MMHG | OXYGEN SATURATION: 98 %

## 2018-06-05 LAB
BUN SERPL-MCNC: 21 MG/DL — SIGNIFICANT CHANGE UP (ref 7–23)
CALCIUM SERPL-MCNC: 8.3 MG/DL — LOW (ref 8.4–10.5)
CHLORIDE SERPL-SCNC: 103 MMOL/L — SIGNIFICANT CHANGE UP (ref 98–107)
CO2 SERPL-SCNC: 23 MMOL/L — SIGNIFICANT CHANGE UP (ref 22–31)
CREAT SERPL-MCNC: 0.8 MG/DL — SIGNIFICANT CHANGE UP (ref 0.5–1.3)
GLUCOSE SERPL-MCNC: 94 MG/DL — SIGNIFICANT CHANGE UP (ref 70–99)
HCT VFR BLD CALC: 31.5 % — LOW (ref 39–50)
HGB BLD-MCNC: 9.1 G/DL — LOW (ref 13–17)
MCHC RBC-ENTMCNC: 19.7 PG — LOW (ref 27–34)
MCHC RBC-ENTMCNC: 28.9 % — LOW (ref 32–36)
MCV RBC AUTO: 68 FL — LOW (ref 80–100)
NRBC # FLD: 0 — SIGNIFICANT CHANGE UP
PLATELET # BLD AUTO: 329 K/UL — SIGNIFICANT CHANGE UP (ref 150–400)
PMV BLD: 10.3 FL — SIGNIFICANT CHANGE UP (ref 7–13)
POTASSIUM SERPL-MCNC: 4.3 MMOL/L — SIGNIFICANT CHANGE UP (ref 3.5–5.3)
POTASSIUM SERPL-SCNC: 4.3 MMOL/L — SIGNIFICANT CHANGE UP (ref 3.5–5.3)
RBC # BLD: 4.63 M/UL — SIGNIFICANT CHANGE UP (ref 4.2–5.8)
RBC # FLD: 21.4 % — HIGH (ref 10.3–14.5)
SODIUM SERPL-SCNC: 139 MMOL/L — SIGNIFICANT CHANGE UP (ref 135–145)
SURGICAL PATHOLOGY STUDY: SIGNIFICANT CHANGE UP
WBC # BLD: 6.24 K/UL — SIGNIFICANT CHANGE UP (ref 3.8–10.5)
WBC # FLD AUTO: 6.24 K/UL — SIGNIFICANT CHANGE UP (ref 3.8–10.5)

## 2018-06-05 RX ORDER — PANTOPRAZOLE SODIUM 20 MG/1
1 TABLET, DELAYED RELEASE ORAL
Qty: 60 | Refills: 0 | OUTPATIENT
Start: 2018-06-05 | End: 2018-07-04

## 2018-06-05 RX ORDER — SUCRALFATE 1 G
1 TABLET ORAL
Qty: 120 | Refills: 0 | OUTPATIENT
Start: 2018-06-05 | End: 2018-07-04

## 2018-06-05 RX ADMIN — Medication 1 GRAM(S): at 06:42

## 2018-06-05 RX ADMIN — PANTOPRAZOLE SODIUM 40 MILLIGRAM(S): 20 TABLET, DELAYED RELEASE ORAL at 06:42

## 2018-06-05 RX ADMIN — Medication 1 GRAM(S): at 14:28

## 2018-06-05 NOTE — PROGRESS NOTE ADULT - SUBJECTIVE AND OBJECTIVE BOX
INTERVAL HPI/OVERNIGHT EVENTS:    pt tolerated PRBC x 2 units well   reports no episodes of melena with last bm overnight  no abdominal pain   no n/v   eating well     MEDICATIONS  (STANDING):  losartan 25 milliGRAM(s) Oral daily  pantoprazole    Tablet 40 milliGRAM(s) Oral two times a day  sucralfate 1 Gram(s) Oral four times a day  venlafaxine XR. 37.5 milliGRAM(s) Oral at bedtime    MEDICATIONS  (PRN):  acetaminophen   Tablet. 650 milliGRAM(s) Oral every 6 hours PRN Moderate Pain (4 - 6)  ALPRAZolam 0.25 milliGRAM(s) Oral at bedtime PRN Anxiety      Allergies    No Known Allergies    Intolerances        Review of Systems:    General:  No wt loss, fevers, chills, night sweats, fatigue   Eyes:  Good vision, no reported pain  ENT:  No sore throat, pain, runny nose, dysphagia  CV:  No pain, palpitations, hypo/hypertension  Resp:  No dyspnea, cough, tachypnea, wheezing  GI:  No pain, No nausea, No vomiting, No diarrhea, No constipation, No weight loss, No fever, No pruritis, No rectal bleeding, No melena, No dysphagia  :  No pain, bleeding, incontinence, nocturia  Muscle:  No pain, weakness  Neuro:  No weakness, tingling, memory problems  Psych:  No fatigue, insomnia, mood problems, depression  Endocrine:  No polyuria, polydypsia, cold/heat intolerance  Heme:  No petechiae, ecchymosis, easy bruisability  Skin:  No rash, tattoos, scars, edema      Vital Signs Last 24 Hrs  T(C): 36.8 (2018 05:57), Max: 37.1 (2018 11:13)  T(F): 98.3 (2018 05:57), Max: 98.7 (2018 11:13)  HR: 61 (2018 05:57) (58 - 73)  BP: 133/67 (2018 05:57) (112/50 - 146/88)  BP(mean): --  RR: 18 (2018 05:57) (16 - 18)  SpO2: 95% (2018 05:57) (95% - 100%)    PHYSICAL EXAM:    Constitutional: NAD  HEENT: EOMI, throat clear  Neck: No LAD, supple  Respiratory: CTA and P  Cardiovascular: S1 and S2, RRR, no M  Gastrointestinal: BS+, soft, NT/ND, neg HSM,  Extremities: No peripheral edema, neg clubbing, cyanosis  Vascular: 2+ peripheral pulses  Neurological: A/O x 3, no focal deficits  Psychiatric: Normal mood, normal affect  Skin: No rashes      LABS:                        8.8    6.56  )-----------( 286      ( 2018 06:03 )             30.0     06-02    138  |  104  |  24<H>  ----------------------------<  91  4.7   |  22  |  0.75    Ca    8.5      2018 06:03    TPro  6.1  /  Alb  3.7  /  TBili  < 0.2<L>  /  DBili  x   /  AST  14  /  ALT  18  /  AlkPhos  42  06-02    PT/INR - ( 2018 11:00 )   PT: 12.1 SEC;   INR: 1.05          PTT - ( 2018 11:00 )  PTT:27.8 SEC  Urinalysis Basic - ( 2018 15:05 )    Color: PLYEL / Appearance: CLEAR / S.015 / pH: 6.0  Gluc: NEGATIVE / Ketone: NEGATIVE  / Bili: NEGATIVE / Urobili: NORMAL mg/dL   Blood: NEGATIVE / Protein: 20 mg/dL / Nitrite: NEGATIVE   Leuk Esterase: NEGATIVE / RBC: 0-2 / WBC 2-5   Sq Epi: OCC / Non Sq Epi: x / Bacteria: x        RADIOLOGY & ADDITIONAL TESTS:
M E D I C A L   A T T E N D I N G    F O L L O W    U P   N O T E                                     ------------------------------------------------------------------------------------------------    patient evaluated by me, case discussed with team, chart, medications, and physical exam reviewed, labs / tests  and vitals reviewed by me, as bellow.   Patient is stable for discharge today.  Patient to follow up with  PMD, GI, Hem., CT Sx, as discussed with pt   See discharge document for full note.      ==================>> MEDICATIONS <<====================    losartan 25 milliGRAM(s) Oral daily  pantoprazole    Tablet 40 milliGRAM(s) Oral two times a day  sucralfate 1 Gram(s) Oral four times a day  venlafaxine XR. 37.5 milliGRAM(s) Oral at bedtime    MEDICATIONS  (PRN):  acetaminophen   Tablet. 650 milliGRAM(s) Oral every 6 hours PRN Moderate Pain (4 - 6)  ALPRAZolam 0.25 milliGRAM(s) Oral at bedtime PRN Anxiety    ==================>> VITAL SIGNS <<==================    T(C): 36.7 (06-05-18 @ 06:43), Max: 36.9 (06-04-18 @ 21:51)  HR: 64 (06-05-18 @ 06:43) (64 - 81)  BP: 119/71 (06-05-18 @ 06:43) (118/70 - 124/69)  RR: 18 (06-05-18 @ 06:43) (18 - 19)  SpO2: 98% (06-05-18 @ 06:43) (98% - 100%)       ==================>> LAB AND IMAGING <<==================                        9.1    6.24  )-----------( 329      ( 05 Jun 2018 06:45 )             31.5          139  |  103  |  21  ----------------------------<  94  4.3   |  23  |  0.80    Ca    8.3<L>      05 Jun 2018 06:45    Creatinine:  0.80  <<==, 0.81  <<==, 0.75  <<==, 0.83  <<==   Sodium:   139  <==, 139  <==, 138  <==, 134  <==      WBC count:   6.24 <<== ,  6.42 <<== ,  7.70 <<== ,  8.34 <<== ,  6.56 <<== ,  8.96 <<==    Hemoglobin:   9.1 <<==,  9.3 <<==,  9.1 <<==,  9.5 <<==,  8.8 <<==,  8.1 <<==   platelets:  329 <==, 300 <==, 295 <==, 319 <==, 286 <==, 318 <==
INTERVAL HPI/OVERNIGHT EVENTS:    pt anxiously awaiting egd/colonoscopy tomorrow  denies n/v/d/c, abdominal pain, melena or brbpr     MEDICATIONS  (STANDING):  bisacodyl 10 milliGRAM(s) Oral every 4 hours  losartan 25 milliGRAM(s) Oral daily  pantoprazole    Tablet 40 milliGRAM(s) Oral two times a day  polyethylene glycol/electrolyte Solution 1 Liter(s) Oral every 4 hours  sucralfate 1 Gram(s) Oral four times a day  venlafaxine XR. 37.5 milliGRAM(s) Oral at bedtime    MEDICATIONS  (PRN):  acetaminophen   Tablet. 650 milliGRAM(s) Oral every 6 hours PRN Moderate Pain (4 - 6)  ALPRAZolam 0.25 milliGRAM(s) Oral at bedtime PRN Anxiety      Allergies    No Known Allergies    Intolerances        Review of Systems:    General:  No wt loss, fevers, chills, night sweats, fatigue   Eyes:  Good vision, no reported pain  ENT:  No sore throat, pain, runny nose, dysphagia  CV:  No pain, palpitations, hypo/hypertension  Resp:  No dyspnea, cough, tachypnea, wheezing  GI:  No pain, No nausea, No vomiting, No diarrhea, No constipation, No weight loss, No fever, No pruritis, No rectal bleeding, No melena, No dysphagia  :  No pain, bleeding, incontinence, nocturia  Muscle:  No pain, weakness  Neuro:  No weakness, tingling, memory problems  Psych:  No fatigue, insomnia, mood problems, depression  Endocrine:  No polyuria, polydypsia, cold/heat intolerance  Heme:  No petechiae, ecchymosis, easy bruisability  Skin:  No rash, tattoos, scars, edema      Vital Signs Last 24 Hrs  T(C): 36.5 (2018 06:05), Max: 36.6 (2018 20:51)  T(F): 97.7 (2018 06:05), Max: 97.8 (2018 20:51)  HR: 59 (2018 06:05) (59 - 63)  BP: 126/81 (2018 06:05) (122/67 - 126/81)  BP(mean): --  RR: 17 (2018 06:05) (17 - 18)  SpO2: 95% (2018 06:05) (95% - 96%)    PHYSICAL EXAM:    Constitutional: NAD  HEENT: EOMI, throat clear  Neck: No LAD, supple  Respiratory: CTA and P  Cardiovascular: S1 and S2, RRR, no M  Gastrointestinal: BS+, soft, NT/ND, neg HSM,  Extremities: No peripheral edema, neg clubbing, cyanosis  Vascular: 2+ peripheral pulses  Neurological: A/O x 3, no focal deficits  Psychiatric: Normal mood, normal affect  Skin: No rashes      LABS:                        9.1    7.70  )-----------( 295      ( 2018 06:25 )             31.3     06-02    138  |  104  |  24<H>  ----------------------------<  91  4.7   |  22  |  0.75    Ca    8.5      2018 06:03    TPro  6.1  /  Alb  3.7  /  TBili  < 0.2<L>  /  DBili  x   /  AST  14  /  ALT  18  /  AlkPhos  42  06-02      Urinalysis Basic - ( 2018 15:05 )    Color: PLYEL / Appearance: CLEAR / S.015 / pH: 6.0  Gluc: NEGATIVE / Ketone: NEGATIVE  / Bili: NEGATIVE / Urobili: NORMAL mg/dL   Blood: NEGATIVE / Protein: 20 mg/dL / Nitrite: NEGATIVE   Leuk Esterase: NEGATIVE / RBC: 0-2 / WBC 2-5   Sq Epi: OCC / Non Sq Epi: x / Bacteria: x        RADIOLOGY & ADDITIONAL TESTS:
INTERVAL HPI/OVERNIGHT EVENTS:No new concerns . Still feel weak . No BM.   Vital Signs Last 24 Hrs  T(C): 36.8 (2018 05:57), Max: 36.9 (2018 19:15)  T(F): 98.3 (2018 05:57), Max: 98.4 (2018 19:15)  HR: 61 (2018 05:57) (58 - 73)  BP: 133/67 (2018 05:57) (122/71 - 146/88)  BP(mean): --  RR: 18 (2018 05:57) (16 - 18)  SpO2: 95% (2018 05:57) (95% - 99%)  I&O's Summary    MEDICATIONS  (STANDING):  losartan 25 milliGRAM(s) Oral daily  pantoprazole    Tablet 40 milliGRAM(s) Oral two times a day  sucralfate 1 Gram(s) Oral four times a day  venlafaxine XR. 37.5 milliGRAM(s) Oral at bedtime    MEDICATIONS  (PRN):  acetaminophen   Tablet. 650 milliGRAM(s) Oral every 6 hours PRN Moderate Pain (4 - 6)  ALPRAZolam 0.25 milliGRAM(s) Oral at bedtime PRN Anxiety    LABS:                        8.8    6.56  )-----------( 286      ( 2018 06:03 )             30.0     06-02    138  |  104  |  24<H>  ----------------------------<  91  4.7   |  22  |  0.75    Ca    8.5      2018 06:03    TPro  6.1  /  Alb  3.7  /  TBili  < 0.2<L>  /  DBili  x   /  AST  14  /  ALT  18  /  AlkPhos  42  06-02    PT/INR - ( 2018 11:00 )   PT: 12.1 SEC;   INR: 1.05          PTT - ( 2018 11:00 )  PTT:27.8 SEC  Urinalysis Basic - ( 2018 15:05 )    Color: PLYEL / Appearance: CLEAR / S.015 / pH: 6.0  Gluc: NEGATIVE / Ketone: NEGATIVE  / Bili: NEGATIVE / Urobili: NORMAL mg/dL   Blood: NEGATIVE / Protein: 20 mg/dL / Nitrite: NEGATIVE   Leuk Esterase: NEGATIVE / RBC: 0-2 / WBC 2-5   Sq Epi: OCC / Non Sq Epi: x / Bacteria: x      CAPILLARY BLOOD GLUCOSE            Urinalysis Basic - ( 2018 15:05 )    Color: PLYEL / Appearance: CLEAR / S.015 / pH: 6.0  Gluc: NEGATIVE / Ketone: NEGATIVE  / Bili: NEGATIVE / Urobili: NORMAL mg/dL   Blood: NEGATIVE / Protein: 20 mg/dL / Nitrite: NEGATIVE   Leuk Esterase: NEGATIVE / RBC: 0-2 / WBC 2-5   Sq Epi: OCC / Non Sq Epi: x / Bacteria: x      REVIEW OF SYSTEMS:  CONSTITUTIONAL: No fever, weight loss, or fatigue  EYES: No eye pain, visual disturbances, or discharge  ENMT:  No difficulty hearing, tinnitus, vertigo; No sinus or throat pain  NECK: No pain or stiffness  BREASTS: No pain, masses, or nipple discharge  RESPIRATORY: No cough, wheezing, chills or hemoptysis; No shortness of breath  CARDIOVASCULAR: No chest pain, palpitations, dizziness, or leg swelling  GASTROINTESTINAL: No abdominal or epigastric pain. No nausea, vomiting, or hematemesis; No diarrhea or constipation. No melena or hematochezia.  GENITOURINARY: No dysuria, frequency, hematuria, or incontinence  NEUROLOGICAL: No headaches, memory loss, loss of strength, numbness, or tremors  SKIN: No itching, burning, rashes, or lesions   LYMPH NODES: No enlarged glands  ENDOCRINE: No heat or cold intolerance; No hair loss  MUSCULOSKELETAL: No joint pain or swelling; No muscle, back, or extremity pain  PSYCHIATRIC: No depression, anxiety, mood swings, or difficulty sleeping  HEME/LYMPH: No easy bruising, or bleeding gums  ALLERY AND IMMUNOLOGIC: No hives or eczema    RADIOLOGY & ADDITIONAL TESTS:    Consultant(s) Notes Reviewed:  [x ] YES  [ ] NO    PHYSICAL EXAM:  GENERAL: NAD, well-groomed, well-developed, not in any distress ,  HEAD:  Atraumatic, Normocephalic  EYES: EOMI, PERRLA, conjunctiva and sclera clear  ENMT: No tonsillar erythema, exudates, or enlargement; Moist mucous membranes, Good dentition, No lesions  NECK: Supple, No JVD, Normal thyroid  NERVOUS SYSTEM:  Alert & Oriented X3, No focal deficit   CHEST/LUNG: Good air entry bilateral with no  rales, rhonchi, wheezing, or rubs  HEART: Regular rate and rhythm; No murmurs, rubs, or gallops  ABDOMEN: Soft, Nontender, Nondistended; Bowel sounds present  EXTREMITIES:  2+ Peripheral Pulses, No clubbing, cyanosis, or edema  SKIN: No rashes or lesions    Care Discussed with Consultants/Other Providers [ x] YES  [ ] NO
M E D I C A L   A T T E N D I N G    F O L L O W    U P   N O T E                                     ------------------------------------------------------------------------------------------------    patient evaluated by me, case discussed with team, chart, medications, and physical exam reviewed, labs / tests  and vitals reviewed by me, as bellow.   Patient is stable for discharge today. Discussed with GI, Pt, NP. CT sx to eval and plan for Hiatal hernia repair. continue PPI  Patient to follow up with  GI, CT Sx, PMD  See discharge document for full note.      ==================>> MEDICATIONS <<====================    losartan 25 milliGRAM(s) Oral daily  pantoprazole    Tablet 40 milliGRAM(s) Oral two times a day  sucralfate 1 Gram(s) Oral four times a day  venlafaxine XR. 37.5 milliGRAM(s) Oral at bedtime    MEDICATIONS  (PRN):  acetaminophen   Tablet. 650 milliGRAM(s) Oral every 6 hours PRN Moderate Pain (4 - 6)  ALPRAZolam 0.25 milliGRAM(s) Oral at bedtime PRN Anxiety    ==================>> VITAL SIGNS <<==================    T(C): 36.6 (06-04-18 @ 06:13), Max: 36.6 (06-03-18 @ 21:27)  HR: 58 (06-04-18 @ 06:13) (57 - 58)  BP: 116/73 (06-04-18 @ 06:13) (103/61 - 116/73)  RR: 18 (06-04-18 @ 06:13) (18 - 18)  SpO2: 95% (06-04-18 @ 06:13) (95% - 98%)     ==================>> LAB AND IMAGING <<==================                        9.3    6.42  )-----------( 300      ( 04 Jun 2018 05:45 )             31.4        06-04    139  |  102  |  14  ----------------------------<  83  4.2   |  24  |  0.81    Ca    8.8      04 Jun 2018 05:45    Creatinine:  0.81  <<==, 0.75  <<==, 0.83  <<==   Sodium:   139  <==, 138  <==, 134  <==      WBC count:   6.42 <<== ,  7.70 <<== ,  8.34 <<== ,  6.56 <<== ,  8.96 <<==    Hemoglobin:   9.3 <<==,  9.1 <<==,  9.5 <<==,  8.8 <<==,  8.1 <<==   platelets:  300 <==, 295 <==, 319 <==, 286 <==, 318 <==    < from: Colonoscopy (06.04.18 @ 09:47) >  Impression:          - Preparation of the colon was poor.                       - Stool in the entire examined colon.                       - Diverticulosis in the sigmoid colon and in the                        descending colon.                       - No specimens collected.  Recommendation:      - [facility]                       - Repeat colonoscopy because the bowel preparation was                        suboptimal as outpatient    < end of copied text >      < from: Upper Endoscopy (06.04.18 @ 09:46) >  Impression:          - Large hiatus hernia.                       - Non-bleeding erosive gastropathy.                       - Normal examined duodenum. Biopsied.  Recommendation:      - [facility].                       - Resume regular diet.                       - Await pathology results.                       - CTS eval for hernia repair    < end of copied text >
_________________________________________________________________________________________  ========>>  M E D I C A L   A T T E N D I N G    F O L L O W  U P  N O T E  <<=========  -----------------------------------------------------------------------------------------------------    - Patient seen and examined by me approximately thirty minutes ago.  - In summary, patient is a 63y year old man who originally presented with symptomatic anemia   - Patient today overall doing ok, comfortable, eating OK.  feels better post transfusions     ==================>> REVIEW OF SYSTEM <<=================    GEN: no fever, no chills, no pain  RESP: no SOB, no cough, no sputum  CVS: no chest pain, no palpitations, no edema  GI: no abdominal pain, no nausea, no constipation, no diarrhea  : no dysuria, no frequency, no hematuria  Neuro: no headache, no dizziness  Derm : no itching, no rash    ==================>> PHYSICAL EXAM <<=================    GEN: A&O X 3 , NAD , comfortable  HEENT: NCAT, PERRL, MMM, hearing intact  Neck: supple , no JVD  CVS: S1S2 , regular , No M/R/G appreciated  PULM: CTA B/L,  no W/R/R appreciated  ABD.: soft. non tender, non distended,  bowel sounds present  Extrem: intact pulses , no edema   PSYCH : normal mood,  not anxious      ==================>> MEDICATIONS <<====================    MEDICATIONS  (STANDING):  bisacodyl 10 milliGRAM(s) Oral every 4 hours  losartan 25 milliGRAM(s) Oral daily  pantoprazole    Tablet 40 milliGRAM(s) Oral two times a day  polyethylene glycol/electrolyte Solution 1 Liter(s) Oral every 4 hours  sucralfate 1 Gram(s) Oral four times a day  venlafaxine XR. 37.5 milliGRAM(s) Oral at bedtime    MEDICATIONS  (PRN):  acetaminophen   Tablet. 650 milliGRAM(s) Oral every 6 hours PRN Moderate Pain (4 - 6)  ALPRAZolam 0.25 milliGRAM(s) Oral at bedtime PRN Anxiety    ==================>> VITAL SIGNS <<==================    T(C): 36.5 (18 @ 06:05), Max: 36.6 (18 @ 20:51)  HR: 59 (18 @ 06:05) (59 - 63)  BP: 126/81 (18 @ 06:05) (122/67 - 126/81)  RR: 17 (18 @ 06:05) (17 - 18)  SpO2: 95% (18 @ 06:05) (95% - 96%)     ==================>> LAB AND IMAGING <<==================                        9.1    7.70  )-----------( 295      ( 2018 06:25 )             31.3        Hemoglobin:   9.1 <<==,  9.5 <<==,  8.8 <<==,  8.1 <<==    138  |  104  |  24<H>  ----------------------------<  91  4.7   |  22  |  0.75    Ca    8.5      2018 06:03    TPro  6.1  /  Alb  3.7  /  TBili  < 0.2<L>  /  DBili  x   /  AST  14  /  ALT  18  /  AlkPhos  42  06-02    Urinalysis Basic - ( 2018 15:05 )  Color: PLYEL / Appearance: CLEAR / S.015 / pH: 6.0  Gluc: NEGATIVE / Ketone: NEGATIVE  / Bili: NEGATIVE / Urobili: NORMAL mg/dL   Blood: NEGATIVE / Protein: 20 mg/dL / Nitrite: NEGATIVE   Leuk Esterase: NEGATIVE / RBC: 0-2 / WBC 2-5   Sq Epi: OCC / Non Sq Epi: x / Bacteria: x    ___________________________________________________________________________________  ===============>>  A S S E S S M E N T   A N D   P L A N <<===============  ------------------------------------------------------------------------------------------    · Assessment		  64yo male  with PMH of  htn, obesity ,Anxiety, depression and PUD/GI bleeds requiring blood transfusions presents from home after being told to go to the ER for low hemoglobin by his hematologist. The patient reports that over the past year his Hgb was known to be between 12-13 and yesterday was told his Hgb was 8.2 by his hematologist.   Last admission in 2017 the patient was admitted for severe anemia with Hgb 4.9 with melena and underwent an EGD  with a Submucosal GE junction lesion that was likely responsible for anemia with EGD pathology negative for malignant cells. On  he underwent EUS/FNA with path negative for malignant cells, was a reactive lymph node collected from mediastinal mass. Then on  he underwent endoscopic resection of lipoma. Since that time he reports no further episodes of melena or hematochezia and had been without any abdominal pain, nausea or vomiting. Just recently he reported increased fatigue which he attributed to increased working out at the gym and taking on more hours.     Problem/Plan - 1:  ·  Problem: Iron deficiency anemia due to chronic blood loss.    S/p  2 Units of PRBC and CBC noted   monitor closely  Transfuse to keep Hgb >8.5G   Hemodynamically stable.   PPI and Carafate .   GI consult noted and planned for Upper Endoscopy and Colonoscopy tomorrow.     Problem/Plan - 2:  ·  Problem: HTN h/o   Losartan 25mg with parameters  monitor      Problem/Plan - 3:  ·  Problem: Anxiety and depression history   Xanax PRN.   Effexor 37.5mg daily.     -GI/DVT Prophylaxis.    --------------------------------------------  Case discussed with pt  Education given on findings and plan of care  ___________________________  H. DORY Santos.  Pager: [phone]
INTERVAL HPI/OVERNIGHT EVENTS:    pt reports no blood or melena in stool   no abdominal pain   long detailed conversation w/patient and his friend on the phone re: EGD findings and plan    MEDICATIONS  (STANDING):  losartan 25 milliGRAM(s) Oral daily  pantoprazole    Tablet 40 milliGRAM(s) Oral two times a day  sucralfate 1 Gram(s) Oral four times a day  venlafaxine XR. 37.5 milliGRAM(s) Oral at bedtime    MEDICATIONS  (PRN):  acetaminophen   Tablet. 650 milliGRAM(s) Oral every 6 hours PRN Moderate Pain (4 - 6)  ALPRAZolam 0.25 milliGRAM(s) Oral at bedtime PRN Anxiety      Allergies    No Known Allergies    Intolerances        Review of Systems:    General:  No wt loss, fevers, chills, night sweats, fatigue   Eyes:  Good vision, no reported pain  ENT:  No sore throat, pain, runny nose, dysphagia  CV:  No pain, palpitations, hypo/hypertension  Resp:  No dyspnea, cough, tachypnea, wheezing  GI:  No pain, No nausea, No vomiting, No diarrhea, No constipation, No weight loss, No fever, No pruritis, No rectal bleeding, No melena, No dysphagia  :  No pain, bleeding, incontinence, nocturia  Muscle:  No pain, weakness  Neuro:  No weakness, tingling, memory problems  Psych:  No fatigue, insomnia, mood problems, depression  Endocrine:  No polyuria, polydypsia, cold/heat intolerance  Heme:  No petechiae, ecchymosis, easy bruisability  Skin:  No rash, tattoos, scars, edema      Vital Signs Last 24 Hrs  T(C): 36.7 (05 Jun 2018 06:43), Max: 36.9 (04 Jun 2018 21:51)  T(F): 98 (05 Jun 2018 06:43), Max: 98.5 (04 Jun 2018 21:51)  HR: 64 (05 Jun 2018 06:43) (64 - 81)  BP: 119/71 (05 Jun 2018 06:43) (118/70 - 124/69)  BP(mean): --  RR: 18 (05 Jun 2018 06:43) (18 - 19)  SpO2: 98% (05 Jun 2018 06:43) (98% - 100%)    PHYSICAL EXAM:    Constitutional: NAD  HEENT: EOMI, throat clear  Neck: No LAD, supple  Respiratory: CTA and P  Cardiovascular: S1 and S2, RRR, no M  Gastrointestinal: BS+, soft, NT/ND, neg HSM,  Extremities: No peripheral edema, neg clubbing, cyanosis  Vascular: 2+ peripheral pulses  Neurological: A/O x 3, no focal deficits  Psychiatric: Normal mood, normal affect  Skin: No rashes      LABS:                        9.1    6.24  )-----------( 329      ( 05 Jun 2018 06:45 )             31.5     06-05    139  |  103  |  21  ----------------------------<  94  4.3   |  23  |  0.80    Ca    8.3<L>      05 Jun 2018 06:45            RADIOLOGY & ADDITIONAL TESTS:    < from: Colonoscopy (06.04.18 @ 09:47) >    [facility]  _______________________________________________________________________________  Patient Name: Kirt Silverio         Procedure Date: 6/4/2018 9:47 AM  MRN: [id number]                     Account Number: [id number]  YOB: 1955               Admit Type: Inpatient  Room: [place] [address]                         Gender: Male  Attending MD: KALA HERNANDEZ DO        _______________________________________________________________________________     Procedure:          Colonoscopy  Indications:         Iron deficiency anemia  Providers:           KALA HERNANDEZ DO  Medicines:           Monitored Anesthesia Care  Complications:       No immediate complications.  Procedure:           After I obtained informed consent, the scope was passed                        under direct vision. Throughout the procedure, the                        patient's blood pressure, pulse, and oxygen saturations                        were monitored continuously. The was introduced through                        the anus and advanced to the cecum, identified by                        appendiceal orifice and ileocecal valve. The colonoscopy                        was performed without difficulty. The patient tolerated                    the procedure well. The quality of the bowel preparation                        was poor.                                                                                   Findings:       A moderate amount of stool was found in the entire colon, interfering        with visualization; small or flat lesions may not have been seen.       Multiple small-mouthed diverticula were found in the sigmoid colon and        in the descending colon.                                       Impression:          - Preparation of the colon was poor.                       - Stool in the entire examined colon.                       - Diverticulosis in the sigmoid colon and in the                        descending colon.                       - No specimens collected.  Recommendation:      - [facility].                       - Repeat colonoscopy because the bowel preparation was                        suboptimal as outpatient                                                                                   Attending Participation:       I personally performed the entire procedure.                                                                                     _________________  KALA HERNANDEZ DO  6/4/2018 11:56:26 AM  This report has been signed electronically.  Number of Addenda: 0    Note Initiated On: 6/4/2018 9:47 AM    < end of copied text >      ------  < from: Upper Endoscopy (06.04.18 @ 09:46) >    [facility]  _______________________________________________________________________________  Patient Name: Kirt Silverio         Procedure Date: 6/4/2018 9:46 AM  MRN: [id number]                     Account Number: [id number]  YOB: 1955               Admit Type: Inpatient  Room: [place] [address]                         Gender: Male  Attending MD: KALA HERNANDEZ DO        _______________________________________________________________________________     Procedure:          Upper GI endoscopy  Indications:         Acute post hemorrhagic anemia  Providers:           KALA HERNANDEZ DO  Medicines:           Monitored Anesthesia Care  Complications:       No immediate complications.  Procedure:           After obtaining informed consent, the endoscope was                        passed under direct vision. Throughout the procedure,                        the patient's blood pressure, pulse, and oxygen                        saturations were monitored continuously. The Endoscope                        was introduced through the mouth, and advanced to the                        second part of duodenum. The upper GI endoscopy was                        accomplished without difficulty. The patient tolerated                      the procedure well.                                                                                   Findings:       A large hiatus hernia was present. There were multiple linear erosions        within the hernia.       A single, non-bleeding erosion was found in the gastric antrum. There        were no stigmata of recent bleeding.       The examined duodenum was normal. Biopsies were taken with a cold        forceps for histology. Estimated blood loss: none.                                                                  Impression:          - Large hiatus hernia.                       - Non-bleeding erosive gastropathy.                       - Normal examined duodenum. Biopsied.  Recommendation:      - [facility].                       - Resume regular diet.                       - Await pathology results.                       - CTS eval for hernia repair                           Attending Participation:       I personally performed the entire procedure.                                                                                     _________________  KALA HERNANDEZ DO  6/4/2018 11:51:46 AM  This report has been signed electronically.  Number of Addenda: 0    Note Initiated On: 6/4/2018 9:46 AM    < end of copied text >

## 2018-06-05 NOTE — PROGRESS NOTE ADULT - PROBLEM SELECTOR PLAN 2
- cont Effexor per primary team   - xanax prn

## 2018-06-05 NOTE — PROGRESS NOTE ADULT - PROBLEM SELECTOR PLAN 1
- occult positive  - trend cbc  - transfuse prn to keep Hgb > 8  - Avoid NSAIDs  - cont protonix 40mg BID  - cont carafate 1g QID  - maalox prn   - s/p EGD with A single, non-bleeding erosion was found in the gastric antrum; there were no stigmata of recent bleeding and Large Hiatal Hernia   - s/p Colonoscopy with poor prep again; diverticulosis noted  - EGD pathology testing  - CTSx eval re: large hiatal hernia noted/appreciated with outpatient follow up planned  - EGD findings discussed at great length with the patient and friend on the phone per his request   - outpatient repeat colonoscopy with his private gastroenterologist, Dr. Hall, in [place]  - no gi objection to discharge planning as d/w patient - occult positive  - trend cbc  - transfuse prn to keep Hgb > 8  - Avoid NSAIDs  - cont protonix 40mg BID  - cont carafate 1g QID  - maalox prn   - iron studies noted; recommend to start iron supplements  - s/p EGD with A single, non-bleeding erosion was found in the gastric antrum; there were no stigmata of recent bleeding and Large Hiatal Hernia   - s/p Colonoscopy with poor prep again; diverticulosis noted  - EGD pathology testing  - CTSx eval re: large hiatal hernia noted/appreciated with outpatient follow up planned  - EGD findings discussed at great length with the patient and friend on the phone per his request   - outpatient repeat colonoscopy with his private gastroenterologist, Dr. Hall, in [place]  - no gi objection to discharge planning as d/w patient

## 2018-06-05 NOTE — PROGRESS NOTE ADULT - ASSESSMENT
64yo female known to our service from previous admission with h/o htn, obesity and PUD/GI bleeds requiring blood transfusions presents from home after being told to go to the ER for low hemoglobin by his hematologist found to be occult positive and anemic here at [facility];  r/o gi bleed
64yo female known to our service from previous admission with h/o htn, obesity and PUD/GI bleeds requiring blood transfusions presents from home after being told to go to the ER for low hemoglobin by his hematologist found to be occult positive and anemic here at [facility];  r/o gi bleed
62yo female known to our service from previous admission with h/o htn, obesity and PUD/GI bleeds requiring blood transfusions presents from home after being told to go to the ER for low hemoglobin by his hematologist found to be occult positive and anemic here at [facility];  r/o gi bleed
62yo male  with PMH of  htn, obesity ,Anxiety, depression and PUD/GI bleeds requiring blood transfusions presents from home after being told to go to the ER for low hemoglobin by his hematologist. The patient reports that over the past year his Hgb was known to be between 12-13 and yesterday was told his Hgb was 8.2 by his hematologist. Last admission in September 2017 the patient was admitted for severe anemia with Hgb 4.9 with melena and underwent an EGD 9/19 with a Submucosal GE junction lesion that was likely responsible for anemia with EGD pathology negative for malignant cells. On 9/20 he underwent EUS/FNA with path negative for malignant cells, was a reactive lymph node collected from mediastinal mass. Then on 9/28 he underwent endoscopic resection of lipoma. Since that time he reports no further episodes of melena or hematochezia and had been without any abdominal pain, nausea or vomiting. Just recently he reported increased fatigue which he attributed to increased working out at the gym and taking on more hours.     Problem/Plan - 1:  ·  Problem: Iron deficiency anemia due to chronic blood loss.  Plan: S/p  2 Units of PRBC and CBC noted . No appropriate response to 2 units ..either lab error or losing blood so will check in few hours . Transfuse to keep Hgb >8.5G      Problem/Plan - 2:  ·  Problem: GI bleed.  Plan: Hemodynamically stable. PPI and Carafate . GI consult noted and planned for Upper Endoscopy and Colonoscopy on Monday .      Problem/Plan - 3:  ·  Problem: HTN (hypertension).  Plan: Losartan 25mg with parameters. BP readings fine.      Problem/Plan - 4:  ·  Problem: Anxiety.  Plan: Xanax PRN.      Problem/Plan - 5:  ·  Problem: Depression.  Plan: Stable . Effexor 37.5mg daily.

## 2018-06-05 NOTE — PROGRESS NOTE ADULT - PROVIDER SPECIALTY LIST ADULT
Gastroenterology
Gastroenterology
Internal Medicine
Gastroenterology

## 2018-11-21 NOTE — PATIENT PROFILE ADULT. - PRO MENTAL HEALTH SX RECENT
- Increase Lamictal  - continue current dose of keppra  - Check keppra and lamictal levels in 1 month.   
none

## 2019-04-12 NOTE — ED ADULT NURSE NOTE - BP NONINVASIVE DIASTOLIC (MM HG)
[General Appearance - Well Developed] : well developed [Normal Appearance] : normal appearance [Well Groomed] : well groomed [General Appearance - Well Nourished] : well nourished [No Deformities] : no deformities [Normal Conjunctiva] : the conjunctiva exhibited no abnormalities [General Appearance - In No Acute Distress] : no acute distress [Eyelids - No Xanthelasma] : the eyelids demonstrated no xanthelasmas [Normal Oral Mucosa] : normal oral mucosa [No Oral Pallor] : no oral pallor [No Oral Cyanosis] : no oral cyanosis [Respiration, Rhythm And Depth] : normal respiratory rhythm and effort [Auscultation Breath Sounds / Voice Sounds] : lungs were clear to auscultation bilaterally [Heart Rate And Rhythm] : heart rate and rhythm were normal [Heart Sounds] : normal S1 and S2 [Arterial Pulses Normal] : the arterial pulses were normal [Edema] : no peripheral edema present [Bowel Sounds] : normal bowel sounds [Abdomen Soft] : soft [Abdomen Tenderness] : non-tender [Nail Clubbing] : no clubbing of the fingernails [Cyanosis, Localized] : no localized cyanosis [Skin Color & Pigmentation] : normal skin color and pigmentation [Skin Turgor] : normal skin turgor [] : no rash [Oriented To Time, Place, And Person] : oriented to person, place, and time [Impaired Insight] : insight and judgment were intact 53 [No Anxiety] : not feeling anxious [FreeTextEntry1] : walks with cane because of right hip pain.

## 2020-01-01 NOTE — DISCHARGE NOTE ADULT - MEDICATION SUMMARY - MEDICATIONS TO TAKE
No
I will START or STAY ON the medications listed below when I get home from the hospital:    acetaminophen 325 mg oral tablet  -- 2 tab(s) by mouth every 6 hours, As needed, Mild Pain (1 - 3)  -- Indication: For Pain    losartan 25 mg oral tablet  -- 1 tab(s) by mouth once a day  -- Indication: For HTN (hypertension)    venlafaxine 37.5 mg oral capsule, extended release  -- 1 cap(s) by mouth once a day (at bedtime)  -- Indication: For Anxiety    ALPRAZolam 0.25 mg oral tablet  -- 1 tab(s) by mouth once a day (at bedtime), As needed, Anxiety  -- Indication: For Anxiety    ferrous sulfate 325 mg (65 mg elemental iron) oral tablet  -- 1 tab(s) by mouth 2 times a day x 30 days   -- Indication: For Anemia    pantoprazole 40 mg oral delayed release tablet  -- 1 tab(s) by mouth once a day (at bedtime)  -- Indication: For GI bleed    Multiple Vitamins oral tablet  -- 1 tab(s) by mouth once a day  -- Indication: For Preventive measure

## 2021-06-05 NOTE — DIETITIAN INITIAL EVALUATION ADULT. - NS AS NUTRI INTERV MEALS SNACK
Plastic Surgery Other (specify)/1. Suggest: PO diet rx: Regular, DASH/TLC (cholesterol and sodium restricted), Low Fat;             2. Monitor PO diet tolerance;          3. Monitor labs, weights, hydration status;/Diets modified for specific foods and ingredients

## 2021-11-16 NOTE — ED PROVIDER NOTE - NS ED ATTENDING STATEMENT MOD
Health Maintenance Due   Topic Date Due   • Depression Screening  Never done   • Diabetes Eye Exam  Never done   • Diabetes A1C  Never done   • Diabetes Foot Exam  Never done   • Colorectal Cancer Screen-  Never done   • Hepatitis C Screening  Never done   • Shingles Vaccine (2 of 3) 03/03/2014   • Medicare Wellness Visit  Never done   • COVID-19 Vaccine (3 - Moderna risk 4-dose series) 04/03/2021   • DM/CKD GFR  06/09/2021   • Influenza Vaccine (1) 09/01/2021       Defer to PCP   Attending Only

## 2022-01-18 PROBLEM — M54.9 DORSALGIA, UNSPECIFIED: Chronic | Status: ACTIVE | Noted: 2017-09-17

## 2022-01-18 PROBLEM — E66.9 OBESITY, UNSPECIFIED: Chronic | Status: ACTIVE | Noted: 2017-09-17

## 2022-01-18 PROBLEM — I10 ESSENTIAL (PRIMARY) HYPERTENSION: Chronic | Status: ACTIVE | Noted: 2017-09-17

## 2022-02-02 ENCOUNTER — APPOINTMENT (OUTPATIENT)
Dept: UROLOGY | Facility: CLINIC | Age: 67
End: 2022-02-02

## 2022-02-02 ENCOUNTER — APPOINTMENT (OUTPATIENT)
Dept: UROLOGY | Facility: CLINIC | Age: 67
End: 2022-02-02
Payer: MEDICARE

## 2022-02-02 DIAGNOSIS — R97.20 ELEVATED PROSTATE, SPECIFIC ANTIGEN [PSA]: ICD-10-CM

## 2022-02-02 DIAGNOSIS — N40.0 BENIGN PROSTATIC HYPERPLASIA WITHOUT LOWER URINARY TRACT SYMPMS: ICD-10-CM

## 2022-02-02 PROCEDURE — 99204 OFFICE O/P NEW MOD 45 MIN: CPT

## 2022-02-02 NOTE — PHYSICAL EXAM
[General Appearance - Well Developed] : well developed [General Appearance - Well Nourished] : well nourished [Normal Appearance] : normal appearance [Well Groomed] : well groomed [General Appearance - In No Acute Distress] : no acute distress [Arterial Pulses Normal] : the pedal pulses were normal [Respiration, Rhythm And Depth] : normal respiratory rhythm and effort [Exaggerated Use Of Accessory Muscles For Inspiration] : no accessory muscle use [Abdomen Soft] : soft [Abdomen Tenderness] : non-tender [Costovertebral Angle Tenderness] : no ~M costovertebral angle tenderness [Urethral Meatus] : meatus normal [Penis Abnormality] : normal circumcised penis [Urinary Bladder Findings] : the bladder was normal on palpation [Scrotum] : the scrotum was normal [Testes Mass (___cm)] : there were no testicular masses [Rectal Exam - Rectum] : digital rectal exam was normal [No Prostate Nodules] : no prostate nodules [Prostate Size ___ (0-4)] : prostate size [unfilled] (scale: 0-4) [Normal Station and Gait] : the gait and station were normal for the patient's age [FreeTextEntry1] : no spinal tenderness [] : no rash [No Focal Deficits] : no focal deficits [Oriented To Time, Place, And Person] : oriented to person, place, and time [Affect] : the affect was normal [Mood] : the mood was normal [Not Anxious] : not anxious

## 2022-02-02 NOTE — HISTORY OF PRESENT ILLNESS
[FreeTextEntry1] : 66 yr old male presents to establish care. Pt is a patient of Dr. Ortiz. PSA in March 2021- 4.2. Repeat PSA in January 2022- 4.94. Pt denies dysuria and hematuria. Pt complains of increased frequency when drinking beer/liquor and caffeine. O/w voiding well, no difficulties with flow.\par \par Surgical hx: patient had "fatty lesion from chest removed.", stage 3 Hiatal hernia, bilateral inguinal hernia as a child\par Medical hx: anemia due hiatal hernia, HTN?, anxiety, herniated disc in the back, seasonal allergies \par Allergies: NKDA\par Social: Alcohol- daily, Smoking- current 0.5 ppd x 50 years, Drug- none, Occupation- retired  \par Family hx: father- liver ca\par Medications: reviewed \par \par PSA\par 4.94- 01/2022\par 4.2- 05/2021 [Dysuria] : no dysuria [Hematuria - Gross] : no gross hematuria

## 2022-02-02 NOTE — ASSESSMENT
[FreeTextEntry1] : I had long discussion today with patient about the psa, digital exam with respect to risks for prostate cancer.  We discussed the utility of prostate MRI as well as some of the new genetic markers in terms of the potential for improving diagnostic accuracy.\par \par We discussed implications as to establish the diagnosis with respect to treatment options chosen, and the risk/benefits of prostate biopsy in terms of procedure, guero-procedure (sepsis, infection, bleeding, retention), and implications/advantages of establishing the diagnosis.\par \par He would like to proceed with: Prostate MRI to visualize the prostate\par \par \par \par

## 2022-02-02 NOTE — LETTER BODY
[Dear  ___] : Dear  [unfilled], [Courtesy Letter:] : I had the pleasure of seeing your patient, [unfilled], in my office today. [Please see my note below.] : Please see my note below. [Consult Closing:] : Thank you very much for allowing me to participate in the care of this patient.  If you have any questions, please do not hesitate to contact me. [Sincerely,] : Sincerely, [FreeTextEntry1] : 66 yr old male presents to establish care. Pt is a patient of Dr. Ortiz. PSA in March 2021- 4.2. Repeat PSA in January 2022- 4.94. Pt denies dysuria and hematuria. Pt complains of increased frequency when drinking beer/liquor and caffeine. O/w voiding well, no difficulties with flow.\par \par Surgical hx: patient had "fatty lesion from chest removed.", stage 3 Hiatal hernia, bilateral inguinal hernia as a child\par Medical hx: anemia due hiatal hernia, HTN?, anxiety, herniated disc in the back, seasonal allergies \par Allergies: NKDA\par Social: Alcohol- daily, Smoking- current 0.5 ppd x 50 years, Drug- none, Occupation- retired  \par Family hx: father- liver ca\par Medications: reviewed \par \par PSA\par 4.94- 01/2022\par 4.2- 05/2021\par \par PE with enlarged prostate, no palpable nodules\par \par I had long discussion today with patient about the psa, digital exam with respect to risks for prostate cancer.  We discussed the utility of prostate MRI as well as some of the new genetic markers in terms of the potential for improving diagnostic accuracy.\par \par We discussed implications as to establish the diagnosis with respect to treatment options chosen, and the risk/benefits of prostate biopsy in terms of procedure, guero-procedure (sepsis, infection, bleeding, retention), and implications/advantages of establishing the diagnosis.\par \par He would like to proceed with: Prostate MRI to visualize the prostate\par \par \par

## 2022-07-13 NOTE — ED PROVIDER NOTE - OBJECTIVE STATEMENT
Chief Complaint   Patient presents with     Follow Up     Speech delay        61 yo M with hx of anemia from unknown source.  Follows by GI in this hospital, scheduled to have endoscopy/colonoscopy in a few weeks.  Pt. has been feeling out of energy, pale, easily winded.  He gets like this when his blood count is down.  Pt. denies bloody/dark stools and vomiting.  No other complaints.   ROS: negative for fever, cough, headache, chest pain, shortness of breath, abd pain, nausea, vomiting, diarrhea, rash, paresthesia, and weakness.   PMH: HTN; Meds: losartan, multi-v, iron daily; SH: Denies smoking/drinking/drug use  GI: follows [facility]

## 2023-05-23 NOTE — ED ADULT NURSE NOTE - ATTEMPT TO OOB
Patient requests all Lab, Cardiology, and Radiology Results on their Discharge Instructions no normal

## 2024-10-07 NOTE — ED ADULT TRIAGE NOTE - BP NONINVASIVE SYSTOLIC (MM HG)
Bed: 15  Expected date: 10/6/24  Expected time: 10:50 PM  Means of arrival: [facility]  Comments:   108

## 2024-12-10 ENCOUNTER — APPOINTMENT (OUTPATIENT)
Dept: ORTHOPEDIC SURGERY | Facility: CLINIC | Age: 69
End: 2024-12-10
Payer: MEDICARE

## 2024-12-10 VITALS — HEIGHT: 71 IN | BODY MASS INDEX: 37.38 KG/M2 | WEIGHT: 267 LBS

## 2024-12-10 DIAGNOSIS — S61.012A LACERATION W/OUT FOREIGN BODY OF LEFT THUMB W/OUT DAMAGE TO NAIL, INITIAL ENCOUNTER: ICD-10-CM

## 2024-12-10 PROCEDURE — 73140 X-RAY EXAM OF FINGER(S): CPT | Mod: LT

## 2024-12-10 PROCEDURE — 99203 OFFICE O/P NEW LOW 30 MIN: CPT

## 2024-12-11 PROBLEM — S61.012A LACERATION OF THUMB, LEFT: Status: ACTIVE | Noted: 2024-12-11

## 2024-12-17 ENCOUNTER — APPOINTMENT (OUTPATIENT)
Dept: ORTHOPEDIC SURGERY | Facility: CLINIC | Age: 69
End: 2024-12-17
Payer: MEDICARE

## 2024-12-17 PROCEDURE — 99213 OFFICE O/P EST LOW 20 MIN: CPT

## 2024-12-26 ENCOUNTER — APPOINTMENT (OUTPATIENT)
Dept: ORTHOPEDIC SURGERY | Facility: CLINIC | Age: 69
End: 2024-12-26
Payer: MEDICARE

## 2024-12-26 DIAGNOSIS — S61.012A LACERATION W/OUT FOREIGN BODY OF LEFT THUMB W/OUT DAMAGE TO NAIL, INITIAL ENCOUNTER: ICD-10-CM

## 2024-12-26 PROCEDURE — 99213 OFFICE O/P EST LOW 20 MIN: CPT

## 2025-09-03 ENCOUNTER — APPOINTMENT (OUTPATIENT)
Dept: UROLOGY | Facility: CLINIC | Age: 70
End: 2025-09-03
Payer: MEDICARE

## 2025-09-03 VITALS
WEIGHT: 268 LBS | SYSTOLIC BLOOD PRESSURE: 151 MMHG | BODY MASS INDEX: 37.52 KG/M2 | HEART RATE: 62 BPM | DIASTOLIC BLOOD PRESSURE: 91 MMHG | TEMPERATURE: 97.9 F | OXYGEN SATURATION: 98 % | HEIGHT: 71 IN

## 2025-09-03 DIAGNOSIS — F41.8 OTHER SPECIFIED ANXIETY DISORDERS: ICD-10-CM

## 2025-09-03 DIAGNOSIS — N40.0 BENIGN PROSTATIC HYPERPLASIA WITHOUT LOWER URINARY TRACT SYMPMS: ICD-10-CM

## 2025-09-03 DIAGNOSIS — R97.20 ELEVATED PROSTATE, SPECIFIC ANTIGEN [PSA]: ICD-10-CM

## 2025-09-03 DIAGNOSIS — I10 ESSENTIAL (PRIMARY) HYPERTENSION: ICD-10-CM

## 2025-09-03 DIAGNOSIS — Z82.49 FAMILY HISTORY OF ISCHEMIC HEART DISEASE AND OTHER DISEASES OF THE CIRCULATORY SYSTEM: ICD-10-CM

## 2025-09-03 PROCEDURE — 99204 OFFICE O/P NEW MOD 45 MIN: CPT

## 2025-09-03 PROCEDURE — G2211 COMPLEX E/M VISIT ADD ON: CPT

## 2025-09-03 RX ORDER — DIAZEPAM 5 MG/1
5 TABLET ORAL
Qty: 1 | Refills: 0 | Status: COMPLETED | COMMUNITY
Start: 2025-09-03 | End: 2025-09-04

## 2025-09-03 RX ORDER — TAMSULOSIN HYDROCHLORIDE 0.4 MG/1
0.4 CAPSULE ORAL
Qty: 30 | Refills: 1 | Status: ACTIVE | COMMUNITY
Start: 2025-09-03 | End: 1900-01-01

## 2025-09-03 RX ORDER — LOSARTAN POTASSIUM 100 MG/1
100 TABLET, FILM COATED ORAL
Refills: 0 | Status: ACTIVE | COMMUNITY

## 2025-09-13 ENCOUNTER — APPOINTMENT (OUTPATIENT)
Dept: MRI IMAGING | Facility: CLINIC | Age: 70
End: 2025-09-13

## 2025-09-13 ENCOUNTER — RESULT REVIEW (OUTPATIENT)
Age: 70
End: 2025-09-13

## 2025-09-13 PROCEDURE — A9585: CPT | Mod: JZ

## 2025-09-13 PROCEDURE — 76498P: CUSTOM

## 2025-09-13 PROCEDURE — 72197 MRI PELVIS W/O & W/DYE: CPT
